# Patient Record
Sex: FEMALE | Race: WHITE | NOT HISPANIC OR LATINO | Employment: FULL TIME | URBAN - METROPOLITAN AREA
[De-identification: names, ages, dates, MRNs, and addresses within clinical notes are randomized per-mention and may not be internally consistent; named-entity substitution may affect disease eponyms.]

---

## 2017-01-13 ENCOUNTER — OFFICE VISIT (OUTPATIENT)
Dept: URGENT CARE | Facility: URGENT CARE | Age: 42
End: 2017-01-13
Payer: COMMERCIAL

## 2017-01-13 VITALS
TEMPERATURE: 98.8 F | OXYGEN SATURATION: 97 % | SYSTOLIC BLOOD PRESSURE: 139 MMHG | DIASTOLIC BLOOD PRESSURE: 91 MMHG | HEART RATE: 97 BPM | WEIGHT: 186 LBS | BODY MASS INDEX: 29.12 KG/M2

## 2017-01-13 DIAGNOSIS — J01.10 ACUTE FRONTAL SINUSITIS, RECURRENCE NOT SPECIFIED: ICD-10-CM

## 2017-01-13 DIAGNOSIS — J06.9 VIRAL UPPER RESPIRATORY TRACT INFECTION: ICD-10-CM

## 2017-01-13 DIAGNOSIS — R07.0 THROAT PAIN: Primary | ICD-10-CM

## 2017-01-13 LAB
DEPRECATED S PYO AG THROAT QL EIA: NORMAL
MICRO REPORT STATUS: NORMAL
SPECIMEN SOURCE: NORMAL

## 2017-01-13 PROCEDURE — 99213 OFFICE O/P EST LOW 20 MIN: CPT | Performed by: NURSE PRACTITIONER

## 2017-01-13 PROCEDURE — 87880 STREP A ASSAY W/OPTIC: CPT | Performed by: NURSE PRACTITIONER

## 2017-01-13 PROCEDURE — 87081 CULTURE SCREEN ONLY: CPT | Performed by: NURSE PRACTITIONER

## 2017-01-13 RX ORDER — FLUTICASONE PROPIONATE 50 MCG
1-2 SPRAY, SUSPENSION (ML) NASAL DAILY
Qty: 1 BOTTLE | Refills: 0 | Status: SHIPPED | OUTPATIENT
Start: 2017-01-13 | End: 2017-01-20

## 2017-01-13 RX ORDER — CETIRIZINE HYDROCHLORIDE 10 MG/1
10 TABLET ORAL EVERY EVENING
Qty: 14 TABLET | Refills: 0 | Status: SHIPPED | OUTPATIENT
Start: 2017-01-13 | End: 2017-01-27

## 2017-01-13 NOTE — MR AVS SNAPSHOT
After Visit Summary   1/13/2017    Roxane Lopes    MRN: [id number]           Patient Information     Date Of Birth          1975        Visit Information        Provider Department      1/13/2017 5:10 PM Kendra Gaspar NP [facility]        Today's Diagnoses     Throat pain    -  1     Viral upper respiratory tract infection         Acute frontal sinusitis, recurrence not specified           Care Instructions      Viral Upper Respiratory Illness (Adult)  You have a viral upper respiratory illness (URI), which is another term for the common cold. This illness is contagious during the first few days. It is spread through the air by coughing and sneezing. It may also be spread by direct contact (touching the sick person and then touching your own eyes, nose, or mouth). Frequent handwashing will decrease risk of spread. Most viral illnesses go away within 7 to 10 days with rest and simple home remedies. Sometimes the illness may last for several weeks. Antibiotics will not kill a virus, and they are generally not prescribed for this condition.    Home care    If symptoms are severe, rest at home for the first 2 to 3 days. When you resume activity, don't let yourself get too tired.    Avoid being exposed to cigarette smoke (yours or others ).    You may use acetaminophen or ibuprofen to control pain and fever, unless another medicine was prescribed. (Note: If you have chronic liver or kidney disease, have ever had a stomach ulcer or gastrointestinal bleeding, or are taking blood-thinning medicines, talk with your healthcare provider before using these medicines.) Aspirin should never be given to anyone under 18 years of age who is ill with a viral infection or fever. It may cause severe liver or brain damage.    Your appetite may be poor, so a light diet is fine. Avoid dehydration by drinking 6 to 8 glasses of fluids per day (water, soft drinks, juices, tea, or soup). Extra fluids will  help loosen secretions in the nose and lungs.    Over-the-counter cold medicines will not shorten the length of time you re sick, but they may be helpful for the following symptoms: cough, sore throat, and nasal and sinus congestion. (Note: Do not use decongestants if you have high blood pressure.)  Follow-up care  Follow up with your healthcare provider, or as advised.  When to seek medical advice  Call your healthcare provider right away if any of these occur:    Cough with lots of colored sputum (mucus)    Severe headache; face, neck, or ear pain    Difficulty swallowing due to throat pain    Fever of 100.4 F (38 C)  Call 911, or get immediate medical care  Call emergency services right away if any of these occur:    Chest pain, shortness of breath, wheezing, or difficulty breathing    Coughing up blood    Inability to swallow due to throat pain    5438-7928 [employer]. [address] [place] [address]. All rights reserved. This information is not intended as a substitute for professional medical care. Always follow your healthcare professional's instructions.              Follow-ups after your visit        Who to contact     If you have questions or need follow up information about today's clinic visit or your schedule please contact [facility] directly at [phone].  Normal or non-critical lab and imaging results will be communicated to you by [employer], letter or phone within 4 business days after the clinic has received the results. If you do not hear from us within 7 days, please contact the clinic through MyChart or phone. If you have a critical or abnormal lab result, we will notify you by phone as soon as possible.  Submit refill requests through [employer] or call your pharmacy and they will forward the refill request to us. Please allow 3 business days for your refill to be completed.          Additional Information About Your Visit        MyChart Information      [employer] gives you secure access to your electronic health record. If you see a primary care provider, you can also send messages to your care team and make appointments. If you have questions, please call your primary care clinic.  If you do not have a primary care provider, please call [phone] and they will assist you.        Care EveryWhere ID     This is your Care EveryWhere ID. This could be used by other organizations to access your [place] medical records  [id number]        Your Vitals Were     Pulse Temperature Pulse Oximetry Breastfeeding?          97 98.8  F (37.1  C) (Oral) 97% No         Blood Pressure from Last 3 Encounters:   01/13/17 139/91   09/19/16 130/80   02/26/16 129/88    Weight from Last 3 Encounters:   01/13/17 186 lb (84.369 kg)   09/19/16 198 lb 12.8 oz (90.175 kg)   02/26/16 197 lb 12.8 oz (89.721 kg)              We Performed the Following     Beta strep group A culture     Strep, Rapid Screen          Today's Medication Changes          These changes are accurate as of: 1/13/17  6:54 PM.  If you have any questions, ask your nurse or doctor.               Start taking these medicines.        Dose/Directions    cetirizine 10 MG tablet   Commonly known as:  zyrTEC   Used for:  Viral upper respiratory tract infection   Started by:  Kendra Gaspar NP        Dose:  10 mg   Take 1 tablet (10 mg) by mouth every evening for 14 days   Quantity:  14 tablet   Refills:  0       fluticasone 50 MCG/ACT spray   Commonly known as:  FLONASE   Used for:  Acute frontal sinusitis, recurrence not specified   Started by:  Kendra Gaspar NP        Dose:  1-2 spray   Spray 1-2 sprays into both nostrils daily for 7 days   Quantity:  1 Bottle   Refills:  0            Where to get your medicines      These medications were sent to [facility]/pharmacy #[id number] - [facility], [place] - [address].  [address] [place] [address]     Phone:  [phone]    - cetirizine 10 MG tablet  - fluticasone 50  MCG/ACT spray             Primary Care Provider Office Phone # Fax #    MEENAKSHI Ramos [facility] [phone]       [facility] [address]  [place] [address]        Thank you!     Thank you for choosing [facility]  for your care. Our goal is always to provide you with excellent care. Hearing back from our patients is one way we can continue to improve our services. Please take a few minutes to complete the written survey that you may receive in the mail after your visit with us. Thank you!             Your Updated Medication List - Protect others around you: Learn how to safely use, store and throw away your medicines at www.disposemymeds.org.          This list is accurate as of: 1/13/17  6:54 PM.  Always use your most recent med list.                   Brand Name Dispense Instructions for use    cetirizine 10 MG tablet    zyrTEC    14 tablet    Take 1 tablet (10 mg) by mouth every evening for 14 days       fluticasone 50 MCG/ACT spray    FLONASE    1 Bottle    Spray 1-2 sprays into both nostrils daily for 7 days       ibuprofen 600 MG tablet    ADVIL/MOTRIN    30 tablet    Take 1 tablet by mouth every 6 hours as needed for pain.       venlafaxine 75 MG 24 hr capsule    EFFEXOR-XR    30 capsule    TAKE 1 CAPSULE (75 MG) BY MOUTH DAILY

## 2017-01-13 NOTE — NURSING NOTE
"Chief Complaint   Patient presents with     Throat Problem     Pt c/o Sore throat, loosing voice, pain while talking, facial pain and pressure, right ear pain. Sx started on Tuesday.        Initial /91 mmHg  Pulse 97  Temp(Src) 98.8  F (37.1  C) (Oral)  Wt 186 lb (84.369 kg)  SpO2 97%  Breastfeeding? No Estimated body mass index is 29.12 kg/(m^2) as calculated from the following:    Height as of 9/19/16: 5' 7\" (1.702 m).    Weight as of this encounter: 186 lb (84.369 kg).  BP completed using cuff size: regular    Jaylin Warren CMA (AAMA)      "

## 2017-01-13 NOTE — PROGRESS NOTES
SUBJECTIVE:                                                    Roxane Lopes is a 41 year old female who presents to clinic today for the following health issues:      RESPIRATORY SYMPTOMS      Duration: Tuesday    Description  Sore throat, loosing voice, pain while talking, facial pain and pressure, right ear pain    Severity: moderate    Accompanying signs and symptoms: None    History (predisposing factors):  none    Precipitating or alleviating factors: None    Therapies tried and outcome:  rest and fluids, dayquil, nyquil, cough drops, sinus OTC medication- no relief.            Allergies   Allergen Reactions     Chantix [Varenicline Tartrate] Anxiety       Past Medical History   Diagnosis Date     DVT (deep venous thrombosis) (H) 4/2011     left leg, was on lovenox and coumadin but have been off since November 2011     Prediabetes      Depression      H/O tubal ligation      2006          Current Outpatient Prescriptions on File Prior to Visit:  venlafaxine (EFFEXOR-XR) 75 MG 24 hr capsule TAKE 1 CAPSULE (75 MG) BY MOUTH DAILY   ibuprofen (ADVIL,MOTRIN) 600 MG tablet Take 1 tablet by mouth every 6 hours as needed for pain.     No current facility-administered medications on file prior to visit.    Social History   Substance Use Topics     Smoking status: Former Smoker -- 0.50 packs/day for 10 years     Types: Cigarettes     Quit date: 10/16/2012     Smokeless tobacco: Never Used      Comment: on e cigs now trying to quit     Alcohol Use: Yes      Comment: Maybe once a year       ROS:  Consitutional: As above  ENT: As above  Respiratory: As above    OBJECTIVE:  /91 mmHg  Pulse 97  Temp(Src) 98.8  F (37.1  C) (Oral)  Wt 186 lb (84.369 kg)  SpO2 97%  Breastfeeding? No  GENERAL APPEARANCE: healthy, alert and no distress  EYES: conjunctiva clear  EARS:small cerumen.   Ear canals w/o erythema, TM's intact w/o erythema.    NOSE/MOUTH: Nasal passages with erythema.  THROAT: mild erythema with slight  tonsillar enlargement . no exudates  NECK: supple, nontender, no lymphadenopathy  RESP: lungs clear to auscultation - no rales, rhonchi or wheezes  CV: regular rates and rhythm, normal S1 S2, no murmur noted  NEURO: awake, alert        Rapid Strep test: Negative    ASSESSMENT:     ICD-10-CM    1. Throat pain R07.0 Strep, Rapid Screen     Beta strep group A culture   2. Viral upper respiratory tract infection J06.9 cetirizine (ZYRTEC) 10 MG tablet    B97.89    3. Acute frontal sinusitis, recurrence not specified J01.10 fluticasone (FLONASE) 50 MCG/ACT spray         PLAN:  I discussed lab results with the patient. Will follow up culture.  Lots of rest and fluids.  RTC if any worsening symptoms or if not improving.    Kendra Gaspar FNP-BC

## 2017-01-14 NOTE — PATIENT INSTRUCTIONS

## 2017-01-15 LAB
BACTERIA SPEC CULT: NORMAL
MICRO REPORT STATUS: NORMAL
SPECIMEN SOURCE: NORMAL

## 2017-03-29 DIAGNOSIS — F41.8 DEPRESSION WITH ANXIETY: ICD-10-CM

## 2017-04-03 NOTE — TELEPHONE ENCOUNTER
Routing refill request to provider for review/approval because:  PHQ9 score>4.  To provider to authorize per RN refill protocol.        Vivian Garcia RN,   [facility], [facility]

## 2017-04-03 NOTE — TELEPHONE ENCOUNTER
venlafaxine (EFFEXOR-XR) 75 MG 24 hr capsule    Last Written Prescription Date: 09/09/16  Last Fill Quantity: 30, # refills: 5  Last Office Visit with [facility], [facility] or [facility] prescribing provider: 09/01/16.        BP Readings from Last 3 Encounters:   01/13/17 (!) 139/91   09/19/16 130/80   02/26/16 129/88     Pulse: (for Fetzima)  Creatinine   Date Value Ref Range Status   09/01/2016 0.71 0.52 - 1.04 mg/dL Final   ]    Last PHQ-9 score on record=   PHQ-9 SCORE 9/1/2016   Total Score -   Total Score 7

## 2017-04-04 RX ORDER — VENLAFAXINE HYDROCHLORIDE 75 MG/1
CAPSULE, EXTENDED RELEASE ORAL
Qty: 30 CAPSULE | Refills: 5 | Status: SHIPPED | OUTPATIENT
Start: 2017-04-04 | End: 2017-11-02

## 2017-04-12 NOTE — TELEPHONE ENCOUNTER
No history of mychart review.     Patient Contact    Attempt # 3    Was call answered?  No.  Left message on home/cell number to return call to clinic regarding follow up questions from recent medication refill.  Dawna Salter, CMA

## 2017-09-27 ENCOUNTER — OFFICE VISIT (OUTPATIENT)
Dept: URGENT CARE | Facility: URGENT CARE | Age: 42
End: 2017-09-27
Payer: COMMERCIAL

## 2017-09-27 VITALS
SYSTOLIC BLOOD PRESSURE: 132 MMHG | DIASTOLIC BLOOD PRESSURE: 92 MMHG | TEMPERATURE: 98.5 F | HEART RATE: 91 BPM | BODY MASS INDEX: 30.38 KG/M2 | WEIGHT: 194 LBS | OXYGEN SATURATION: 90 %

## 2017-09-27 DIAGNOSIS — R07.0 THROAT PAIN: Primary | ICD-10-CM

## 2017-09-27 DIAGNOSIS — H66.001 ACUTE SUPPURATIVE OTITIS MEDIA OF RIGHT EAR WITHOUT SPONTANEOUS RUPTURE OF TYMPANIC MEMBRANE, RECURRENCE NOT SPECIFIED: ICD-10-CM

## 2017-09-27 LAB
DEPRECATED S PYO AG THROAT QL EIA: NORMAL
SPECIMEN SOURCE: NORMAL

## 2017-09-27 PROCEDURE — 87880 STREP A ASSAY W/OPTIC: CPT | Performed by: NURSE PRACTITIONER

## 2017-09-27 PROCEDURE — 87081 CULTURE SCREEN ONLY: CPT | Performed by: NURSE PRACTITIONER

## 2017-09-27 PROCEDURE — 99213 OFFICE O/P EST LOW 20 MIN: CPT | Performed by: NURSE PRACTITIONER

## 2017-09-27 RX ORDER — AMOXICILLIN 500 MG/1
500 CAPSULE ORAL 2 TIMES DAILY
Qty: 20 CAPSULE | Refills: 0 | Status: SHIPPED | OUTPATIENT
Start: 2017-09-27 | End: 2017-10-07

## 2017-09-27 RX ORDER — LORATADINE 10 MG/1
10 TABLET ORAL DAILY
COMMUNITY
End: 2018-05-21

## 2017-09-27 NOTE — PATIENT INSTRUCTIONS
Otitis Media (Middle-Ear Infection) in Adults  Otitis media is another name for a middle-ear infection. It means an infection behind your eardrum. This kind of ear infection can happen after any condition that keeps fluid from draining from the middle ear. These conditions include allergies, a cold, a sore throat, or a respiratory infection.  Middle-ear infections are common in children, but they can also happen in adults. An ear infection in an adult may mean a more serious problem than in a child. So you may need additional tests. If you have an ear infection, you should see your health care provider for treatment.  What are the types of middle-ear infections?  Infections can affect the middle ear in several ways. They are:    Acute otitis media. This middle-ear infection occurs suddenly. It causes swelling and redness. Fluid and mucus become trapped inside the ear. You can have a fever and ear pain.    Otitis media with effusion. Fluid (effusion) and mucus build up in the middle ear after the infection goes away. You may feel like your middle ear is full. This can continue for months and may affect your hearing.    Chronic otitis media with effusion. Fluid (effusion) remains in the middle ear for a long time. Or it builds up again and again, even though there is no infection. This type of middle-ear infection may be hard to treat. It may also affect your hearing.  Who is more likely to get a middle-ear infection?  You are more likely to get an ear infection if you:    Smoke or are around someone who smokes    Have seasonal or year-round allergy symptoms    Have a cold or other upper respiratory infection  What causes a middle-ear infection?  The middle ear connects to the throat by a canal called the eustachian tube. This tube helps even out the pressure between the outer ear and the inner ear. A cold or allergy can irritate the tube or cause the area around it to swell. This can keep fluid from draining from  the middle ear. The fluid builds up behind the eardrum. Bacteria and viruses can grow in this fluid. The bacteria and viruses cause the middle-ear infection.  What are the symptoms of a middle-ear infection?  Common symptoms of a middle-ear infection in adults are:    Pain in 1 or both ears    Drainage from the ear    Muffled hearing    Sore throat   You may also have a fever. Rarely, your balance can be affected.  These symptoms may be the same as for other conditions. It s important to talk with your health care provider if you think you have a middle-ear infection. If you have a high fever, severe pain behind your ear, or paralysis in your face, see your provider as soon as you can.  How is a middle-ear infection diagnosed?  Your health care provider will take a medical history and do a physical exam. He or she will look at the outer ear and eardrum with an otoscope. The otoscope is a lighted tool that lets your provider see inside the ear. A pneumatic otoscope blows a puff of air into the ear to check how well your eardrum moves. If you eardrum doesn t move well, it may mean you have fluid behind it.  Your provider may also do a test called tympanometry. This test tells how well the middle ear is working. It can find any changes in pressure in the middle ear. Your provider may test your hearing with a tuning fork.  How is a middle-ear infection treated?  A middle-ear infection may be treated with:    Antibiotics, taken by mouth or as ear drops    Medication for pain    Decongestants, antihistamines, or nasal steroids  Your health care provider may also have you try autoinsufflation. This helps adjust the air pressure in your ear. For this, you pinch your nose and gently exhale. This forces air back through the eustachian tube.  The exact treatment for your ear infection will depend on the type of infection you have. In general, if your symptoms don t get better in 48 to 72 hours, contact your health care  provider.  Middle-ear infections can cause long-term problems if not treated. They can lead to:    Infection in other parts of the head    Permanent hearing loss    Paralysis of a nerve in your face  If you have a middle-ear infection that doesn t get better, you may need to see an ear, nose, and throat specialist (otolaryngologist). You may need a CT scan or MRI to check for head and neck cancer.  Ear tubes  Sometimes fluid stays in the middle ear even after you take antibiotics and the infection goes away. In this case, your health care provider may suggest that a small tube be placed in your ear. The tube is put at the opening of the eardrum. The tube keeps fluid from building up and relieves pressure in the middle ear. It can also help you hear better. This surgery is called myringotomy. It is not often done in adults.  The tubes usually fall out on their own after 6 months to a year.    8055-2424 [employer]. [address] [place] [address]. All rights reserved. This information is not intended as a substitute for professional medical care. Always follow your healthcare professional's instructions.        When You Have a Sore Throat    A sore throat can be painful. There are many reasons why you may have a sore throat. Your healthcare provider will work with you to find the cause of your sore throat. He or she will also find the best treatment for you.  What causes a sore throat?  Sore throats can be caused or worsened by:    Cold or flu viruses    Bacteria    Irritants such as tobacco smoke or air pollution    Acid reflux  A healthy throat  The tonsils are on the sides of the throat near the base of the tongue. They collect viruses and bacteria and help fight infection. The throat (pharynx) is the passage for air. Mucus from the nasal cavity also moves down the passage.  An inflamed throat  The tonsils and pharynx can become inflamed due to a cold or flu virus. Postnasal drip (excess  mucus draining from the nasal cavity) can irritate the throat. It can also make the throat or tonsils more likely to be infected by bacteria. Severe, untreated tonsillitis in children or adults can cause a pocket of pus (abscess) to form near the tonsil.  Your evaluation  A medical evaluation can help find the cause of your sore throat. It can also help your healthcare provider choose the best treatment for you. The evaluation may include a health history, physical exam, and diagnostic tests.  Health history  Your healthcare provider may ask you the following:    How long has the sore throat lasted and how have you been treating it?    Do you have any other symptoms, such as body aches, fever, or cough?    Does your sore throat recur? If so, how often? How many days of school or work have you missed because of a sore throat?    Do you have trouble eating or swallowing?    Have you been told that you snore or have other sleep problems?    Do you have bad breath?    Do you cough up bad-tasting mucus?  Physical exam  During the exam, your healthcare provider checks your ears, nose, and throat for problems. He or she also checks for swelling in the neck, and may listen to your chest.  Possible tests  Other tests your healthcare provider may perform include:    A throat swab to check for bacteria such as streptococcus (the bacteria that causes strep throat)    A blood test to check for mononucleosis (a viral infection)    A chest X-ray to rule out pneumonia, especially if you have a cough  Treating a sore throat  Treatment depends on many factors. What is the likely cause? Is the problem recent? Does it keep coming back? In many cases, the best thing to do is to treat the symptoms, rest, and let the problem heal itself. Antibiotics may help clear up some bacterial infections. For cases of severe or recurring tonsillitis, the tonsils may need to be removed.  Relieving your symptoms    Don t smoke, and avoid secondhand  "smoke.    For children, try throat sprays or Popsicles. Adults and older children may try lozenges.    Drink warm liquids to soothe the throat and help thin mucus. Avoid alcohol, spicy foods, and acidic drinks such as orange juice. These can irritate the throat.    Gargle with warm saltwater (1 teaspoon of salt to 8 ounces of warm water).    Use a humidifier to keep air moist and relieve throat dryness.    Try over-the-counter pain relievers such as acetaminophen or ibuprofen. Use as directed, and don t exceed the recommended dose. Don t give aspirin to children.   Are antibiotics needed?  If your sore throat is due to a bacterial infection, antibiotics may speed healing and prevent complications. Although group A streptococcus (\"strep throat\" or GAS) is the major treatable infection for a sore throat, GAS causes only 5% to 15% of sore throats in adults who seek medical care. Most sore throats are caused by cold or flu viruses. And antibiotics don t treat viral illness. In fact, using antibiotics when they re not needed may produce bacteria that are harder to kill. Your healthcare provider will prescribe antibiotics only if he or she thinks they are likely to help.  If antibiotics are prescribed  Take the medicine exactly as directed. Be sure to finish your prescription even if you re feeling better. And be sure to ask your healthcare provider or pharmacist what side effects are common and what to do about them.  Is surgery needed?  In some cases, tonsils need to be removed. This is often done as outpatient (same-day) surgery. Your healthcare provider may advise removing the tonsils in cases of:    Several severe bouts of tonsillitis in a year.  Severe  episodes include those that lead to missed days of school or work, or that need to be treated with antibiotics.    Tonsillitis that causes breathing problems during sleep    Tonsillitis caused by food particles collecting in pouches in the tonsils (cryptic " tonsillitis)  Call your healthcare provider if any of the following occur:    Symptoms worsen, or new symptoms develop.    Swollen tonsils make breathing difficult.    The pain is severe enough to keep you from drinking liquids.    A skin rash, hives, or wheezing develops. Any of these could signal an allergic reaction to antibiotics.    Symptoms don t improve within a week.    Symptoms don t improve within 2 to 3 days of starting antibiotics.   Date Last Reviewed: 10/1/2016    5811-4545 [employer] [address] [place] [address]. All rights reserved. This information is not intended as a substitute for professional medical care. Always follow your healthcare professional's instructions.

## 2017-09-27 NOTE — NURSING NOTE
"Chief Complaint   Patient presents with     URI     2 weeks       Initial BP (!) 132/92 (BP Location: Left arm, Patient Position: Chair, Cuff Size: Adult Regular)  Pulse 91  Temp 98.5  F (36.9  C) (Oral)  Wt 194 lb (88 kg)  SpO2 90%  BMI 30.38 kg/m2 Estimated body mass index is 30.38 kg/(m^2) as calculated from the following:    Height as of 9/19/16: 5' 7\" (1.702 m).    Weight as of this encounter: 194 lb (88 kg).  Medication Reconciliation: complete     Adriane Carpio CMA      "

## 2017-09-27 NOTE — MR AVS SNAPSHOT
After Visit Summary   9/27/2017    Roxane Lopes    MRN: [id number]           Patient Information     Date Of Birth          1975        Visit Information        Provider Department      9/27/2017 3:45 PM Kendra Gaspar NP [facility]        Today's Diagnoses     Throat pain    -  1    Acute suppurative otitis media of right ear without spontaneous rupture of tympanic membrane, recurrence not specified          Care Instructions      Otitis Media (Middle-Ear Infection) in Adults  Otitis media is another name for a middle-ear infection. It means an infection behind your eardrum. This kind of ear infection can happen after any condition that keeps fluid from draining from the middle ear. These conditions include allergies, a cold, a sore throat, or a respiratory infection.  Middle-ear infections are common in children, but they can also happen in adults. An ear infection in an adult may mean a more serious problem than in a child. So you may need additional tests. If you have an ear infection, you should see your health care provider for treatment.  What are the types of middle-ear infections?  Infections can affect the middle ear in several ways. They are:    Acute otitis media. This middle-ear infection occurs suddenly. It causes swelling and redness. Fluid and mucus become trapped inside the ear. You can have a fever and ear pain.    Otitis media with effusion. Fluid (effusion) and mucus build up in the middle ear after the infection goes away. You may feel like your middle ear is full. This can continue for months and may affect your hearing.    Chronic otitis media with effusion. Fluid (effusion) remains in the middle ear for a long time. Or it builds up again and again, even though there is no infection. This type of middle-ear infection may be hard to treat. It may also affect your hearing.  Who is more likely to get a middle-ear infection?  You are more likely to get an ear  infection if you:    Smoke or are around someone who smokes    Have seasonal or year-round allergy symptoms    Have a cold or other upper respiratory infection  What causes a middle-ear infection?  The middle ear connects to the throat by a canal called the eustachian tube. This tube helps even out the pressure between the outer ear and the inner ear. A cold or allergy can irritate the tube or cause the area around it to swell. This can keep fluid from draining from the middle ear. The fluid builds up behind the eardrum. Bacteria and viruses can grow in this fluid. The bacteria and viruses cause the middle-ear infection.  What are the symptoms of a middle-ear infection?  Common symptoms of a middle-ear infection in adults are:    Pain in 1 or both ears    Drainage from the ear    Muffled hearing    Sore throat   You may also have a fever. Rarely, your balance can be affected.  These symptoms may be the same as for other conditions. It s important to talk with your health care provider if you think you have a middle-ear infection. If you have a high fever, severe pain behind your ear, or paralysis in your face, see your provider as soon as you can.  How is a middle-ear infection diagnosed?  Your health care provider will take a medical history and do a physical exam. He or she will look at the outer ear and eardrum with an otoscope. The otoscope is a lighted tool that lets your provider see inside the ear. A pneumatic otoscope blows a puff of air into the ear to check how well your eardrum moves. If you eardrum doesn t move well, it may mean you have fluid behind it.  Your provider may also do a test called tympanometry. This test tells how well the middle ear is working. It can find any changes in pressure in the middle ear. Your provider may test your hearing with a tuning fork.  How is a middle-ear infection treated?  A middle-ear infection may be treated with:    Antibiotics, taken by mouth or as ear  drops    Medication for pain    Decongestants, antihistamines, or nasal steroids  Your health care provider may also have you try autoinsufflation. This helps adjust the air pressure in your ear. For this, you pinch your nose and gently exhale. This forces air back through the eustachian tube.  The exact treatment for your ear infection will depend on the type of infection you have. In general, if your symptoms don t get better in 48 to 72 hours, contact your health care provider.  Middle-ear infections can cause long-term problems if not treated. They can lead to:    Infection in other parts of the head    Permanent hearing loss    Paralysis of a nerve in your face  If you have a middle-ear infection that doesn t get better, you may need to see an ear, nose, and throat specialist (otolaryngologist). You may need a CT scan or MRI to check for head and neck cancer.  Ear tubes  Sometimes fluid stays in the middle ear even after you take antibiotics and the infection goes away. In this case, your health care provider may suggest that a small tube be placed in your ear. The tube is put at the opening of the eardrum. The tube keeps fluid from building up and relieves pressure in the middle ear. It can also help you hear better. This surgery is called myringotomy. It is not often done in adults.  The tubes usually fall out on their own after 6 months to a year.    5123-2830 The "[employer]. [address] [place] [address]. All rights reserved. This information is not intended as a substitute for professional medical care. Always follow your healthcare professional's instructions.        When You Have a Sore Throat    A sore throat can be painful. There are many reasons why you may have a sore throat. Your healthcare provider will work with you to find the cause of your sore throat. He or she will also find the best treatment for you.  What causes a sore throat?  Sore throats can be caused or worsened  by:    Cold or flu viruses    Bacteria    Irritants such as tobacco smoke or air pollution    Acid reflux  A healthy throat  The tonsils are on the sides of the throat near the base of the tongue. They collect viruses and bacteria and help fight infection. The throat (pharynx) is the passage for air. Mucus from the nasal cavity also moves down the passage.  An inflamed throat  The tonsils and pharynx can become inflamed due to a cold or flu virus. Postnasal drip (excess mucus draining from the nasal cavity) can irritate the throat. It can also make the throat or tonsils more likely to be infected by bacteria. Severe, untreated tonsillitis in children or adults can cause a pocket of pus (abscess) to form near the tonsil.  Your evaluation  A medical evaluation can help find the cause of your sore throat. It can also help your healthcare provider choose the best treatment for you. The evaluation may include a health history, physical exam, and diagnostic tests.  Health history  Your healthcare provider may ask you the following:    How long has the sore throat lasted and how have you been treating it?    Do you have any other symptoms, such as body aches, fever, or cough?    Does your sore throat recur? If so, how often? How many days of school or work have you missed because of a sore throat?    Do you have trouble eating or swallowing?    Have you been told that you snore or have other sleep problems?    Do you have bad breath?    Do you cough up bad-tasting mucus?  Physical exam  During the exam, your healthcare provider checks your ears, nose, and throat for problems. He or she also checks for swelling in the neck, and may listen to your chest.  Possible tests  Other tests your healthcare provider may perform include:    A throat swab to check for bacteria such as streptococcus (the bacteria that causes strep throat)    A blood test to check for mononucleosis (a viral infection)    A chest X-ray to rule out  "pneumonia, especially if you have a cough  Treating a sore throat  Treatment depends on many factors. What is the likely cause? Is the problem recent? Does it keep coming back? In many cases, the best thing to do is to treat the symptoms, rest, and let the problem heal itself. Antibiotics may help clear up some bacterial infections. For cases of severe or recurring tonsillitis, the tonsils may need to be removed.  Relieving your symptoms    Don t smoke, and avoid secondhand smoke.    For children, try throat sprays or Popsicles. Adults and older children may try lozenges.    Drink warm liquids to soothe the throat and help thin mucus. Avoid alcohol, spicy foods, and acidic drinks such as orange juice. These can irritate the throat.    Gargle with warm saltwater (1 teaspoon of salt to 8 ounces of warm water).    Use a humidifier to keep air moist and relieve throat dryness.    Try over-the-counter pain relievers such as acetaminophen or ibuprofen. Use as directed, and don t exceed the recommended dose. Don t give aspirin to children.   Are antibiotics needed?  If your sore throat is due to a bacterial infection, antibiotics may speed healing and prevent complications. Although group A streptococcus (\"strep throat\" or GAS) is the major treatable infection for a sore throat, GAS causes only 5% to 15% of sore throats in adults who seek medical care. Most sore throats are caused by cold or flu viruses. And antibiotics don t treat viral illness. In fact, using antibiotics when they re not needed may produce bacteria that are harder to kill. Your healthcare provider will prescribe antibiotics only if he or she thinks they are likely to help.  If antibiotics are prescribed  Take the medicine exactly as directed. Be sure to finish your prescription even if you re feeling better. And be sure to ask your healthcare provider or pharmacist what side effects are common and what to do about them.  Is surgery needed?  In some " cases, tonsils need to be removed. This is often done as outpatient (same-day) surgery. Your healthcare provider may advise removing the tonsils in cases of:    Several severe bouts of tonsillitis in a year.  Severe  episodes include those that lead to missed days of school or work, or that need to be treated with antibiotics.    Tonsillitis that causes breathing problems during sleep    Tonsillitis caused by food particles collecting in pouches in the tonsils (cryptic tonsillitis)  Call your healthcare provider if any of the following occur:    Symptoms worsen, or new symptoms develop.    Swollen tonsils make breathing difficult.    The pain is severe enough to keep you from drinking liquids.    A skin rash, hives, or wheezing develops. Any of these could signal an allergic reaction to antibiotics.    Symptoms don t improve within a week.    Symptoms don t improve within 2 to 3 days of starting antibiotics.   Date Last Reviewed: 10/1/2016    0602-2704 The [employer]. [address] [place] [address]. All rights reserved. This information is not intended as a substitute for professional medical care. Always follow your healthcare professional's instructions.                Follow-ups after your visit        Who to contact     If you have questions or need follow up information about today's clinic visit or your schedule please contact [facility] directly at [phone].  Normal or non-critical lab and imaging results will be communicated to you by MyChart, letter or phone within 4 business days after the clinic has received the results. If you do not hear from us within 7 days, please contact the clinic through MyChart or phone. If you have a critical or abnormal lab result, we will notify you by phone as soon as possible.  Submit refill requests through [employer] or call your pharmacy and they will forward the refill request to us. Please allow 3 business days for your refill  to be completed.          Additional Information About Your Visit        [employer] Information     [employer] gives you secure access to your electronic health record. If you see a primary care provider, you can also send messages to your care team and make appointments. If you have questions, please call your primary care clinic.  If you do not have a primary care provider, please call [phone] and they will assist you.        Care EveryWhere ID     This is your Care EveryWhere ID. This could be used by other organizations to access your [place] medical records  [id number]        Your Vitals Were     Pulse Temperature Pulse Oximetry BMI (Body Mass Index)          91 98.5  F (36.9  C) (Oral) 90% 30.38 kg/m2         Blood Pressure from Last 3 Encounters:   09/27/17 (!) 132/92   01/13/17 (!) 139/91   09/19/16 130/80    Weight from Last 3 Encounters:   09/27/17 194 lb (88 kg)   01/13/17 186 lb (84.4 kg)   09/19/16 198 lb 12.8 oz (90.2 kg)              We Performed the Following     Beta strep group A culture     Strep, Rapid Screen          Today's Medication Changes          These changes are accurate as of: 9/27/17  5:06 PM.  If you have any questions, ask your nurse or doctor.               Start taking these medicines.        Dose/Directions    amoxicillin 500 MG capsule   Commonly known as:  AMOXIL   Used for:  Acute suppurative otitis media of right ear without spontaneous rupture of tympanic membrane, recurrence not specified   Started by:  Kendra Gaspar NP        Dose:  500 mg   Take 1 capsule (500 mg) by mouth 2 times daily for 10 days   Quantity:  20 capsule   Refills:  0            Where to get your medicines      These medications were sent to [facility]/pharmacy #[id number] - [place], [place] - [address].  [address] [place] [address]     Phone:  [phone]     amoxicillin 500 MG capsule                Primary Care Provider Office Phone # Fax #    MEENAKSHI Ramos CNP  [phone]       [address]  [place] [address]        Equal Access to Services     RADHAMES BOYKIN : Hadomari aad ku haddiane Bunch, wavitoda luqmaryellen, elisata kaalejandrada mamta, susy verdinmaryuri sofiya. So [facility] [phone].    ATENCIÓN: Si habla español, tiene a purvis disposición servicios gratuitos de asistencia lingüística. Llame al [phone].    We comply with applicable federal civil rights laws and [place] laws. We do not discriminate on the basis of race, color, national origin, age, disability sex, sexual orientation or gender identity.            Thank you!     Thank you for choosing [facility]  for your care. Our goal is always to provide you with excellent care. Hearing back from our patients is one way we can continue to improve our services. Please take a few minutes to complete the written survey that you may receive in the mail after your visit with us. Thank you!             Your Updated Medication List - Protect others around you: Learn how to safely use, store and throw away your medicines at www.disposemymeds.org.          This list is accurate as of: 9/27/17  5:06 PM.  Always use your most recent med list.                   Brand Name Dispense Instructions for use Diagnosis    amoxicillin 500 MG capsule    AMOXIL    20 capsule    Take 1 capsule (500 mg) by mouth 2 times daily for 10 days    Acute suppurative otitis media of right ear without spontaneous rupture of tympanic membrane, recurrence not specified       ibuprofen 600 MG tablet    ADVIL/MOTRIN    30 tablet    Take 1 tablet by mouth every 6 hours as needed for pain.        loratadine 10 MG tablet    CLARITIN     Take 10 mg by mouth daily    Throat pain       venlafaxine 75 MG 24 hr capsule    EFFEXOR-XR    30 capsule    TAKE 1 CAPSULE (75 MG) BY MOUTH DAILY    Depression with anxiety

## 2017-09-27 NOTE — PROGRESS NOTES
SUBJECTIVE:   Roxane Lopes is a 42 year old female who presents to clinic today for the following health issues:      RESPIRATORY SYMPTOMS      Duration: 2 weeks    Description  nasal congestion, sore throat, cough, fever, ear pain right, headache, fatigue/malaise, hoarse voice and myalgias    Severity: moderate    Accompanying signs and symptoms: None    History (predisposing factors):  none    Precipitating or alleviating factors: None    Therapies tried and outcome:  oral decongestant antihistamine OTC NSAID       Allergies   Allergen Reactions     Chantix [Varenicline Tartrate] Anxiety       Past Medical History:   Diagnosis Date     Depression      DVT (deep venous thrombosis) (H) 4/2011    left leg, was on lovenox and coumadin but have been off since November 2011     H/O tubal ligation     2006      Prediabetes          Current Outpatient Prescriptions on File Prior to Visit:  venlafaxine (EFFEXOR-XR) 75 MG 24 hr capsule TAKE 1 CAPSULE (75 MG) BY MOUTH DAILY   ibuprofen (ADVIL,MOTRIN) 600 MG tablet Take 1 tablet by mouth every 6 hours as needed for pain.     No current facility-administered medications on file prior to visit.     Social History   Substance Use Topics     Smoking status: Former Smoker     Packs/day: 0.50     Years: 10.00     Types: Cigarettes     Quit date: 10/16/2012     Smokeless tobacco: Never Used      Comment: on e cigs now trying to quit     Alcohol use Yes      Comment: Maybe once a year       ROS:   Constitutional: no fevers  ENT: as above    OBJECTIVE:  BP (!) 132/92 (BP Location: Left arm, Patient Position: Chair, Cuff Size: Adult Regular)  Pulse 91  Temp 98.5  F (36.9  C) (Oral)  Wt 194 lb (88 kg)  SpO2 90%  BMI 30.38 kg/m2   General:   awake, alert, and cooperative.  NAD.   Head: Normocephalic, atraumatic.  Eyes: Conjunctiva clear,   ENT: . RT Canal is intact, right  TM is bulging and has erythema, left CANAL is intact, left TM is grey and translucent  Throat: Mild erythema,  no tonsillar hypertrophy or exudates.  Neuro: Alert and oriented - normal speech.    ASSESSMENT:    ICD-10-CM    1. Throat pain R07.0 loratadine (CLARITIN) 10 MG tablet     Strep, Rapid Screen     Beta strep group A culture   2. Acute suppurative otitis media of right ear without spontaneous rupture of tympanic membrane, recurrence not specified H66.001 amoxicillin (AMOXIL) 500 MG capsule       PLAN:     Antibiotics as prescribed.  Recheck ear in 2 weeks.  Patient educational/instructional material provided including reasons for follow-up    The patient indicates understanding of these issues and agrees with the plan.   Advised about symptoms which might herald more serious problems.    Kendra Gaspar  FN-BC  Family Nurse Practitoner

## 2017-09-28 LAB
BACTERIA SPEC CULT: NORMAL
SPECIMEN SOURCE: NORMAL

## 2017-11-02 DIAGNOSIS — F41.8 DEPRESSION WITH ANXIETY: ICD-10-CM

## 2017-11-02 NOTE — LETTER
November 3, 2017      Roxane Lopes  [address] [place] [id number]              Dear Roxane,      We recently received a call from your pharmacy requesting a refill of your medication. We have provided a 30 day refill of your medication, venlafaxine (EFFEXOR-XR) 75 MG 24 hr capsule, as requested.      A review of your chart indicates that your last office visit was on 9/1/2016.  An appointment is required twice yearly with your provider.    We have authorized one time 30 day refill of your medication to allow time for you to schedule.     If you have a history of diabetes or high cholesterol, please come in fasting for the appointment. Fasting entails nothing to eat or drink 8 hours prior to your appointment; with the exception on water. You may take your medication the day of the appointment.    Please call the clinic to schedule your appointment.    Thank you for taking an active role in your healthcare.      Sincerely,    Miguelina Bledsoe CNP

## 2017-11-03 RX ORDER — VENLAFAXINE HYDROCHLORIDE 75 MG/1
CAPSULE, EXTENDED RELEASE ORAL
Qty: 30 CAPSULE | Refills: 0 | Status: SHIPPED | OUTPATIENT
Start: 2017-11-03 | End: 2018-07-18

## 2017-11-03 NOTE — TELEPHONE ENCOUNTER
venlafaxine (EFFEXOR-XR) 75 MG 24 hr capsule  Last Written Prescription Date: 4/4/2017  Last Fill Quantity: 30, # refills: 5  Last Office Visit with [facility] primary care provider:  9/1/2016        Last PHQ-9 score on record= Depression with anxiety [F41.8]   PHQ-9 SCORE 9/1/2016   Total Score -   Total Score 7        Jazlyn refill. Letter sent to patient. Deloris Whitman RN

## 2017-12-17 ENCOUNTER — HEALTH MAINTENANCE LETTER (OUTPATIENT)
Age: 42
End: 2017-12-17

## 2018-05-21 ENCOUNTER — RADIANT APPOINTMENT (OUTPATIENT)
Dept: ULTRASOUND IMAGING | Facility: CLINIC | Age: 43
End: 2018-05-21
Attending: FAMILY MEDICINE
Payer: COMMERCIAL

## 2018-05-21 ENCOUNTER — OFFICE VISIT (OUTPATIENT)
Dept: PEDIATRICS | Facility: CLINIC | Age: 43
End: 2018-05-21
Payer: COMMERCIAL

## 2018-05-21 ENCOUNTER — TELEPHONE (OUTPATIENT)
Dept: PEDIATRICS | Facility: CLINIC | Age: 43
End: 2018-05-21

## 2018-05-21 VITALS
HEART RATE: 95 BPM | TEMPERATURE: 96.9 F | SYSTOLIC BLOOD PRESSURE: 120 MMHG | OXYGEN SATURATION: 100 % | BODY MASS INDEX: 31.87 KG/M2 | DIASTOLIC BLOOD PRESSURE: 82 MMHG | WEIGHT: 203.5 LBS

## 2018-05-21 DIAGNOSIS — I82.402 DEEP VEIN THROMBOSIS (DVT) OF LEFT LOWER EXTREMITY, UNSPECIFIED CHRONICITY, UNSPECIFIED VEIN (H): ICD-10-CM

## 2018-05-21 DIAGNOSIS — M79.605 PAIN OF LEFT LOWER EXTREMITY: Primary | ICD-10-CM

## 2018-05-21 DIAGNOSIS — M79.662 PAIN OF LEFT LOWER LEG: Primary | ICD-10-CM

## 2018-05-21 DIAGNOSIS — M79.605 PAIN OF LEFT LOWER EXTREMITY: ICD-10-CM

## 2018-05-21 PROCEDURE — 93971 EXTREMITY STUDY: CPT | Mod: LT | Performed by: RADIOLOGY

## 2018-05-21 PROCEDURE — 99213 OFFICE O/P EST LOW 20 MIN: CPT | Performed by: FAMILY MEDICINE

## 2018-05-21 RX ORDER — IBUPROFEN 800 MG/1
800 TABLET, FILM COATED ORAL EVERY 8 HOURS PRN
Qty: 30 TABLET | Refills: 1 | COMMUNITY
Start: 2018-05-21 | End: 2022-12-12

## 2018-05-21 ASSESSMENT — PAIN SCALES - GENERAL: PAINLEVEL: EXTREME PAIN (8)

## 2018-05-21 NOTE — PATIENT INSTRUCTIONS
Acute Pain, Uncertain Cause  Pain can be caused by many conditions that range from very minor to very serious. In some cases, though, pain comes and goes with no apparent cause.  We were not able to find the exact cause for your pain. At this time there is no sign of any serious illness causing your pain. More tests may be needed to determine the cause. In many cases, pain like this goes away by itself.  Home care  Take any medicines as prescribed. If another medicine was not prescribed for pain, you can take an over-the-counter pain medicine such as ibuprofen or acetaminophen. Use these as directed on the label.    Follow-up care  Follow up with your healthcare provider or our staff as directed.  When to seek medical advice  Call your healthcare provider for any of the following:    Pain changes in pattern    Pain doesn't lessen or gets worse    New symptoms appear    Fever of 100.4 F (38 C) or higher, or as directed by your healthcare provider  Date Last Reviewed: 7/26/2015 2000-2017 [employer]. [address] [place] [address], [place] [id number]. All rights reserved. This information is not intended as a substitute for professional medical care. Always follow your healthcare professional's instructions.    Resrt, ice or heat, limit standign and walkinmg, elevaste involved extremity, and trial of Tylenol #3 as prescribed. Noreen drew with Adelaide Orozco if not improving.

## 2018-05-21 NOTE — TELEPHONE ENCOUNTER
Patient would like to request a pain medication be sent in for her. She states the Ibuprofen is not working. Please advise.

## 2018-05-21 NOTE — TELEPHONE ENCOUNTER
Routing to Dr. Hernandez who saw patient today in clinic.    Vivian Garcia RN,   [facility]

## 2018-05-21 NOTE — PROGRESS NOTES
SUBJECTIVE:   Roxane Lopes is a 43 year old female who presents to clinic today for the following health issues:      Concern - Pt that is here for the concerns of having a possible blood clot on her left leg behind the knee. Pt states that she had clot five years ago on the same leg.   Onset: 05/16/2018    Description:   Pt is here for the concerns of having a possible blood clot on her left leg behind the knee. Pt states that she had clot five years ago on the same leg.       Intensity: 7/10    Progression of Symptoms:  worsening and constant    Accompanying Signs & Symptoms:  Pt states that her leg feels like fire,and has noticed some swelling    Previous history of similar problem:   Yes, five to six years ago    Precipitating factors:   Worsened by: sitting, bending, moving it    Alleviating factors:  Improved by: Not moving it    Therapies Tried and outcome: Elevating, ice,         Problem list and histories reviewed & adjusted, as indicated.  Additional history: as documented    Patient Active Problem List   Diagnosis     Varicose veins     Left leg pain     DVT (deep venous thrombosis) (H)     PTSD (post-traumatic stress disorder)     Tobacco use disorder     Venous insufficiency of leg-left     Prediabetes     Thumb injury     Anxiety     Gastroesophageal reflux disease without esophagitis     Galactorrhea of left breast     Generalized hyperhidrosis     Fatigue     CARDIOVASCULAR SCREENING; LDL GOAL LESS THAN 160     Depression with anxiety     Past Surgical History:   Procedure Laterality Date     LAPAROSCOPIC CHOLECYSTECTOMY  10/9/2012    Procedure: LAPAROSCOPIC CHOLECYSTECTOMY;  Laparoscopic Cholecystectomy;  Surgeon: Martell Briscoe MD;  Location:  OR     LAPAROSCOPIC TUBAL LIGATION         Social History   Substance Use Topics     Smoking status: Former Smoker     Packs/day: 0.50     Years: 10.00     Types: Cigarettes     Quit date: 10/16/2012     Smokeless tobacco: Never Used      Comment: on  e cigs now trying to quit     Alcohol use Yes      Comment: Maybe once a year     Family History   Problem Relation Age of Onset     DIABETES       DIABETES Maternal Grandfather      Other - See Comments Maternal Grandmother      24 yrs old blood clot     C.A.D. No family hx of      CEREBROVASCULAR DISEASE No family hx of      Hypertension No family hx of      Anesthesia Reaction No family hx of      Arthritis No family hx of      Asthma No family hx of      Breast Cancer No family hx of      Cancer - colorectal No family hx of      Prostate Cancer No family hx of      Thyroid Disease No family hx of      Lipids No family hx of      Congenital Anomalies No family hx of      Depression Mother          Current Outpatient Prescriptions   Medication Sig Dispense Refill     venlafaxine (EFFEXOR-XR) 75 MG 24 hr capsule TAKE 1 CAPSULE (75 MG) BY MOUTH DAILY 30 capsule 0     ibuprofen (ADVIL,MOTRIN) 600 MG tablet Take 1 tablet by mouth every 6 hours as needed for pain. (Patient not taking: Reported on 5/21/2018) 30 tablet 0     loratadine (CLARITIN) 10 MG tablet Take 10 mg by mouth daily       Allergies   Allergen Reactions     Chantix [Varenicline Tartrate] Anxiety     BP Readings from Last 3 Encounters:   05/21/18 120/82   09/27/17 (!) 132/92   01/13/17 (!) 139/91    Wt Readings from Last 3 Encounters:   05/21/18 203 lb 8 oz (92.3 kg)   09/27/17 194 lb (88 kg)   01/13/17 186 lb (84.4 kg)                    Reviewed and updated as needed this visit by clinical staff  Tobacco  Allergies  Meds       Reviewed and updated as needed this visit by Provider         ROS:  Constitutional, HEENT, cardiovascular, pulmonary, GI, , musculoskeletal, neuro, skin, endocrine and psych systems are negative, except as otherwise noted.    OBJECTIVE:     /82 (BP Location: Right arm, Patient Position: Sitting, Cuff Size: Adult Large)  Pulse 95  Temp 96.9  F (36.1  C) (Temporal)  Wt 203 lb 8 oz (92.3 kg)  SpO2 100%  BMI 31.87  kg/m2  Body mass index is 31.87 kg/(m^2).  GENERAL: healthy, alert and no distress  RESP: lungs clear to auscultation - no rales, rhonchi or wheezes  CV: regular rate and rhythm, normal S1 S2, no S3 or S4, no murmur, click or rub, no peripheral edema and peripheral pulses strong  MS: no gross musculoskeletal defects noted, no edema  MS: decreased range of motion due to pain in her Lt popliteal space , no edema, peripheral pulses normal and tenderness to palpation in her proximal calf and popliteal space.   SKIN: no suspicious lesions or rashes  NEURO: Normal strength and tone, mentation intact and speech normal    Diagnostic Test Results:  Venous ultrasound-neg for thrombosis.     ASSESSMENT/PLAN:           ICD-10-CM    1. Pain of left lower extremity M79.605    2. Deep vein thrombosis (DVT) of left lower extremity, unspecified chronicity, unspecified vein (H) I82.402        See Patient Instructions    Tacho Hernandez MD  [facility]

## 2018-05-21 NOTE — MR AVS SNAPSHOT
After Visit Summary   5/21/2018    Roxane Lopes    MRN: [id number]           Patient Information     Date Of Birth          1975        Visit Information        Provider Department      5/21/2018 11:30 AM Tacho Hernandez MD [facility]        Today's Diagnoses     Pain of left lower extremity    -  1    Deep vein thrombosis (DVT) of left lower extremity, unspecified chronicity, unspecified vein (H)          Care Instructions      Acute Pain, Uncertain Cause  Pain can be caused by many conditions that range from very minor to very serious. In some cases, though, pain comes and goes with no apparent cause.  We were not able to find the exact cause for your pain. At this time there is no sign of any serious illness causing your pain. More tests may be needed to determine the cause. In many cases, pain like this goes away by itself.  Home care  Take any medicines as prescribed. If another medicine was not prescribed for pain, you can take an over-the-counter pain medicine such as ibuprofen or acetaminophen. Use these as directed on the label.    Follow-up care  Follow up with your healthcare provider or our staff as directed.  When to seek medical advice  Call your healthcare provider for any of the following:    Pain changes in pattern    Pain doesn't lessen or gets worse    New symptoms appear    Fever of 100.4 F (38 C) or higher, or as directed by your healthcare provider  Date Last Reviewed: 7/26/2015 2000-2017 [employer] [address] [place] [address]. All rights reserved. This information is not intended as a substitute for professional medical care. Always follow your healthcare professional's instructions.    Resrt, ice or heat, limit standign and walkinmg, elevaste involved extremity, and trial of Tylenol #3 as prescribed. Follolu p with Adelaide Orozco if not improving.             Follow-ups after your visit        Who to contact     If you have  questions or need follow up information about today's clinic visit or your schedule please contact [facility] directly at [phone].  Normal or non-critical lab and imaging results will be communicated to you by [employer], letter or phone within 4 business days after the clinic has received the results. If you do not hear from us within 7 days, please contact the clinic through [employer] or phone. If you have a critical or abnormal lab result, we will notify you by phone as soon as possible.  Submit refill requests through [employer] or call your pharmacy and they will forward the refill request to us. Please allow 3 business days for your refill to be completed.          Additional Information About Your Visit        [employer] Information     [employer] gives you secure access to your electronic health record. If you see a primary care provider, you can also send messages to your care team and make appointments. If you have questions, please call your primary care clinic.  If you do not have a primary care provider, please call [phone] and they will assist you.      [employer] is an electronic gateway that provides easy, online access to your medical records. With [employer], you can request a clinic appointment, read your test results, renew a prescription or communicate with your care team.     To access your existing account, please contact your [facility] or call [phone] for assistance.        Care EveryWhere ID     This is your Care EveryWhere ID. This could be used by other organizations to access your [place] medical records  [id number]        Your Vitals Were     Pulse Temperature Pulse Oximetry BMI (Body Mass Index)          95 96.9  F (36.1  C) (Temporal) 100% 31.87 kg/m2         Blood Pressure from Last 3 Encounters:   05/21/18 120/82   09/27/17 (!) 132/92   01/13/17 (!) 139/91    Weight from Last 3 Encounters:   05/21/18 203 lb 8 oz (92.3 kg)   09/27/17 194  lb (88 kg)   01/13/17 186 lb (84.4 kg)                 Today's Medication Changes          These changes are accurate as of 5/21/18  1:12 PM.  If you have any questions, ask your nurse or doctor.               These medicines have changed or have updated prescriptions.        Dose/Directions    ibuprofen 800 MG tablet   Commonly known as:  ADVIL/MOTRIN   This may have changed:  Another medication with the same name was removed. Continue taking this medication, and follow the directions you see here.   Used for:  Pain of left lower extremity   Changed by:  Tacho Hernandez MD        Dose:  800 mg   Take 1 tablet (800 mg) by mouth every 8 hours as needed for moderate pain   Quantity:  30 tablet   Refills:  1         Stop taking these medicines if you haven't already. Please contact your care team if you have questions.     loratadine 10 MG tablet   Commonly known as:  CLARITIN   Stopped by:  Tacho Hernandez MD                    Primary Care Provider Office Phone # Fax #    Miguelina Hammond Rakan, APRN [facility] [phone]       [address]  [place] [address]        Equal Access to Services     [facility]: Hadii aad ku hadasho Soomaali, waaxda luqadaha, qaybta kaalmada adeegyada, waxay gilson haymiriam prescott . So [facility] [phone].    ATENCIÓN: Si habla español, tiene a purvis disposición servicios gratuitos de asistencia lingüística. Llame al [phone].    We comply with applicable federal civil rights laws and [place] laws. We do not discriminate on the basis of race, color, national origin, age, disability, sex, sexual orientation, or gender identity.            Thank you!     Thank you for choosing [facility]  for your care. Our goal is always to provide you with excellent care. Hearing back from our patients is one way we can continue to improve our services. Please take a few minutes to complete the written survey that you may receive in the mail  after your visit with us. Thank you!             Your Updated Medication List - Protect others around you: Learn how to safely use, store and throw away your medicines at www.disposemymeds.org.          This list is accurate as of 5/21/18  1:12 PM.  Always use your most recent med list.                   Brand Name Dispense Instructions for use Diagnosis    ibuprofen 800 MG tablet    ADVIL/MOTRIN    30 tablet    Take 1 tablet (800 mg) by mouth every 8 hours as needed for moderate pain    Pain of left lower extremity       venlafaxine 75 MG 24 hr capsule    EFFEXOR-XR    30 capsule    TAKE 1 CAPSULE (75 MG) BY MOUTH DAILY    Depression with anxiety

## 2018-05-22 ENCOUNTER — TELEPHONE (OUTPATIENT)
Dept: PEDIATRICS | Facility: CLINIC | Age: 43
End: 2018-05-22

## 2018-05-22 NOTE — TELEPHONE ENCOUNTER
Called patient and gave message per Dr. Hernandez.  Patient verbalized understanding and agreement to plan. Yola Andrews RN, [facility] Lower Extremity Venous Duplex Left   Status:  Final result   Visible to patient:  Yes (MyChart) Dx:  Pain of left lower extremity; Deep ve... Order: [id number]       Notes Recorded by Tacho Hernandez MD on 5/21/2018 at 2:40 PM  1.  No evidence left lower extremity deep venous thrombosis.

## 2018-05-23 ENCOUNTER — TELEPHONE (OUTPATIENT)
Dept: PEDIATRICS | Facility: CLINIC | Age: 43
End: 2018-05-23

## 2018-05-23 DIAGNOSIS — R25.2 CRAMPS OF LOWER EXTREMITY: Primary | ICD-10-CM

## 2018-05-23 RX ORDER — CYCLOBENZAPRINE HCL 10 MG
10 TABLET ORAL 2 TIMES DAILY PRN
Qty: 30 TABLET | Refills: 0 | Status: SHIPPED | OUTPATIENT
Start: 2018-05-23 | End: 2018-06-19

## 2018-05-23 NOTE — TELEPHONE ENCOUNTER
Left message:  Patient informed RX sent to [facility] pharmacy.      cyclobenzaprine (FLEXERIL) 10 MG tablet 30 tablet 0 5/23/2018        Sig - Route: Take 1 tablet (10 mg) by mouth 2 times daily as needed for muscle spasms - Oral      Class: E-Prescribe      Order: [id number]      E-Prescribing Status: Receipt confirmed by pharmacy (5/23/2018  3:07 PM CDT)        Printout Tracking      External Result Report       Pharmacy      [facility]/PHARMACY #[id number] - [facility], [place] - [address]           Vivian Garcia RN,   [facility]

## 2018-05-23 NOTE — TELEPHONE ENCOUNTER
Reason for call:  Other   Patient called regarding (reason for call): call back  Additional comments: is having bad leg cramping.  Wondering if she can get a muscle relaxer prescribed.  Saw Dr Hernandez.  Please call patient to discuss.[facility]/PHARMACY #[id number] - [facility], [place] - [address].    Phone number to reach patient:  Cell number on file:    Telephone Information:   Mobile [phone]       Best Time:  any    Can we leave a detailed message on this number?  YES

## 2018-05-23 NOTE — TELEPHONE ENCOUNTER
Routed to Dr. Hernandez who saw patient on 5/21/18    Additional comments: is having bad leg cramping.  Wondering if she can get a muscle relaxer prescribed    Vivian Garcia RN,   [facility]

## 2018-06-11 ENCOUNTER — TELEPHONE (OUTPATIENT)
Dept: PEDIATRICS | Facility: CLINIC | Age: 43
End: 2018-06-11

## 2018-06-11 DIAGNOSIS — M79.662 PAIN OF LEFT LOWER LEG: ICD-10-CM

## 2018-06-11 NOTE — TELEPHONE ENCOUNTER
Routing refill request to provider for review/approval because:  Drug not on the [facility] refill protocol     acetaminophen-codeine (TYLENOL #3) 300-30 MG per tablet 35 tablet 0 5/21/2018  --   Sig - Route: Take 1 tablet by mouth every 6 hours as needed for moderate to      Last OV with prescribing provider Dr. Hernandez: 5/21/2018    No future apts.     Deloris Whitman RN

## 2018-06-11 NOTE — TELEPHONE ENCOUNTER
Patient would like to request a refill for acetaminophen-codeine (TYLENOL #3) 300-30 MG per tablet [30054] (Order [id number]) .  Patient uses the [facility] on [address] on [address]. Please advise.

## 2018-06-12 ENCOUNTER — TELEPHONE (OUTPATIENT)
Dept: PEDIATRICS | Facility: CLINIC | Age: 43
End: 2018-06-12

## 2018-06-12 NOTE — TELEPHONE ENCOUNTER
Health Call Center    Phone Message    May a detailed message be left on voicemail: yes    Reason for Call: Medication Question or concern regarding medication   Prescription Clarification  Name of Medication:   acetaminophen-codeine (TYLENOL #3) 300-30 MG per tablet 35 tablet 0       Prescribing Provider: Dr. Hernandez   Pharmacy: [facility]   What on the order needs clarification? Please shred hard copy and send Rx electronically to pharmacy. Please advise.          Action Taken: Message routed to:  Primary Care p [phone]

## 2018-06-12 NOTE — TELEPHONE ENCOUNTER
acetaminophen-codeine (TYLENOL #3) 300-30 MG per tablet faxed to [facility] at fax #: [phone]. Received confirmation from [employer] that fax was sent successfully.  Natalya Ross CMA

## 2018-06-12 NOTE — TELEPHONE ENCOUNTER
Pharmacy is calling requesting to shred hard copy and to fax over to [facility] in [place]. Please advise.

## 2018-06-12 NOTE — TELEPHONE ENCOUNTER
Patient is calling to follow up on refill request. She states she is in a lot of pain and needs the medication. Please advise.

## 2018-06-19 ENCOUNTER — TELEPHONE (OUTPATIENT)
Dept: PEDIATRICS | Facility: CLINIC | Age: 43
End: 2018-06-19

## 2018-06-19 DIAGNOSIS — R25.2 CRAMPS OF LOWER EXTREMITY: ICD-10-CM

## 2018-06-19 RX ORDER — ROPINIROLE 0.25 MG/1
0.25 TABLET, FILM COATED ORAL AT BEDTIME
Qty: 30 TABLET | Refills: 1 | Status: SHIPPED | OUTPATIENT
Start: 2018-06-19 | End: 2018-06-19

## 2018-06-19 RX ORDER — CYCLOBENZAPRINE HCL 10 MG
10 TABLET ORAL 2 TIMES DAILY PRN
Qty: 30 TABLET | Refills: 0 | Status: CANCELLED | OUTPATIENT
Start: 2018-06-19

## 2018-06-19 RX ORDER — CYCLOBENZAPRINE HCL 10 MG
10 TABLET ORAL 2 TIMES DAILY PRN
Qty: 30 TABLET | Refills: 0 | Status: SHIPPED | OUTPATIENT
Start: 2018-06-19 | End: 2018-10-04

## 2018-06-19 NOTE — TELEPHONE ENCOUNTER
Routing refill request to provider for review/approval because:  Drug not on the [facility] refill protocol     cyclobenzaprine (FLEXERIL) 10 MG tablet 30 tablet 0 5/23/2018  --   Sig - Route: Take 1 tablet (10 mg) by mouth 2 times daily as needed for muscle spasms - Oral     Last OV with prescribing provider Dr. Hernandez:  5/21/2018    No future apts.     Deloris Whitman RN

## 2018-06-22 NOTE — TELEPHONE ENCOUNTER
cyclobenzaprine (FLEXERIL) 10 MG tablet 30 tablet 0 6/19/2018  No   Sig - Route: Take 1 tablet (10 mg) by mouth 2 times daily as needed for muscle spasms - Oral     Tia BORA Whitman

## 2018-07-18 ENCOUNTER — OFFICE VISIT (OUTPATIENT)
Dept: FAMILY MEDICINE | Facility: CLINIC | Age: 43
End: 2018-07-18
Payer: COMMERCIAL

## 2018-07-18 VITALS
WEIGHT: 205 LBS | DIASTOLIC BLOOD PRESSURE: 80 MMHG | BODY MASS INDEX: 32.18 KG/M2 | OXYGEN SATURATION: 98 % | SYSTOLIC BLOOD PRESSURE: 120 MMHG | TEMPERATURE: 98 F | HEART RATE: 87 BPM | HEIGHT: 67 IN

## 2018-07-18 DIAGNOSIS — F41.8 DEPRESSION WITH ANXIETY: ICD-10-CM

## 2018-07-18 DIAGNOSIS — H65.01 RIGHT ACUTE SEROUS OTITIS MEDIA, RECURRENCE NOT SPECIFIED: Primary | ICD-10-CM

## 2018-07-18 DIAGNOSIS — R22.0 RIGHT FACIAL SWELLING: ICD-10-CM

## 2018-07-18 PROCEDURE — 99214 OFFICE O/P EST MOD 30 MIN: CPT | Performed by: PREVENTIVE MEDICINE

## 2018-07-18 RX ORDER — VENLAFAXINE HYDROCHLORIDE 75 MG/1
CAPSULE, EXTENDED RELEASE ORAL
Qty: 30 CAPSULE | Refills: 0 | Status: SHIPPED | OUTPATIENT
Start: 2018-07-18 | End: 2018-09-11

## 2018-07-18 RX ORDER — AMOXICILLIN AND CLAVULANATE POTASSIUM 500; 125 MG/1; MG/1
1 TABLET, FILM COATED ORAL 3 TIMES DAILY
Qty: 21 TABLET | Refills: 0 | Status: SHIPPED | OUTPATIENT
Start: 2018-07-18 | End: 2018-07-25

## 2018-07-18 RX ORDER — AMOXICILLIN 500 MG/1
CAPSULE ORAL
Refills: 0 | COMMUNITY
Start: 2017-11-22 | End: 2018-10-04

## 2018-07-18 RX ORDER — PREDNISONE 20 MG/1
20 TABLET ORAL 2 TIMES DAILY
Qty: 10 TABLET | Refills: 0 | Status: SHIPPED | OUTPATIENT
Start: 2018-07-18 | End: 2018-10-04

## 2018-07-18 ASSESSMENT — PAIN SCALES - GENERAL: PAINLEVEL: EXTREME PAIN (8)

## 2018-07-18 ASSESSMENT — ANXIETY QUESTIONNAIRES
3. WORRYING TOO MUCH ABOUT DIFFERENT THINGS: MORE THAN HALF THE DAYS
2. NOT BEING ABLE TO STOP OR CONTROL WORRYING: MORE THAN HALF THE DAYS
1. FEELING NERVOUS, ANXIOUS, OR ON EDGE: MORE THAN HALF THE DAYS
6. BECOMING EASILY ANNOYED OR IRRITABLE: MORE THAN HALF THE DAYS
GAD7 TOTAL SCORE: 10
5. BEING SO RESTLESS THAT IT IS HARD TO SIT STILL: SEVERAL DAYS
IF YOU CHECKED OFF ANY PROBLEMS ON THIS QUESTIONNAIRE, HOW DIFFICULT HAVE THESE PROBLEMS MADE IT FOR YOU TO DO YOUR WORK, TAKE CARE OF THINGS AT HOME, OR GET ALONG WITH OTHER PEOPLE: SOMEWHAT DIFFICULT
7. FEELING AFRAID AS IF SOMETHING AWFUL MIGHT HAPPEN: NOT AT ALL

## 2018-07-18 ASSESSMENT — PATIENT HEALTH QUESTIONNAIRE - PHQ9: 5. POOR APPETITE OR OVEREATING: SEVERAL DAYS

## 2018-07-18 NOTE — MR AVS SNAPSHOT
After Visit Summary   7/18/2018    Roxane Lopes    MRN: [id number]           Patient Information     Date Of Birth          1975        Visit Information        Provider Department      7/18/2018 9:20 AM Monique Lindo MD [facility]        Today's Diagnoses     Right acute serous otitis media, recurrence not specified    -  1    Right facial swelling          Care Instructions    At [facility], we strive to deliver an exceptional experience to you, every time we see you.  If you receive a survey in the mail, please send us back your thoughts. We really do value your feedback.    Based on your medical history, these are the current health maintenance/preventive care services that you are due for (some may have been done at this visit.)  Health Maintenance Due   Topic Date Due     HIV SCREEN (SYSTEM ASSIGNED)  04/16/1995     DEPRESSION ACTION PLAN Q1 YR  06/15/2016     PHQ-9 Q6 MONTHS  03/01/2017     PAP Q3 YR  10/16/2017       Suggested websites for health information:  Www.[employer] [facility].[employer] : Up to date and easily searchable information on multiple topics.  Www.medlineplus.gov : medication info, interactive tutorials, watch real surgeries online  Www.familydoctor.org : good info from the Academy of Family Physicians  Www.cdc.gov : public health info, travel advisories, epidemics (H1N1)  Www.aap.org : children's health info, normal development, vaccinations  Www.health.state.[place].us : [place] dept of health, public health issues in [place], N1N1    Your care team:                            Family Medicine Internal Medicine   MD Charles Felton MD Shantel Branch-Fleming, MD Katya Georgiev PA-C Megan Hill, MEENAKSHI Garcia MD Pediatrics   DAVE Roland, MD Marianela Lynch APRN CNP   MD Fay Hutner MD Deborah Mielke, MD Kim Thein, APRN GEOVANI      Clinic hours: Monday - Thursday 7 am-7 pm;  "Fridays 7 am-5 pm.   Urgent care: Monday - Friday 11 am-9 pm; Saturday and Sunday 9 am-5 pm.  Pharmacy : Monday -Thursday 8 am-8 pm; Friday 8 am-6 pm; Saturday and Sunday 9 am-5 pm.     Clinic: [phone]   Pharmacy: [phone]              Follow-ups after your visit        Future tests that were ordered for you today     Open Future Orders        Priority Expected Expires Ordered    CT Facial Bones without Contrast Routine  7/18/2019 7/18/2018            Who to contact     If you have questions or need follow up information about today's clinic visit or your schedule please contact [facility] directly at [phone].  Normal or non-critical lab and imaging results will be communicated to you by MyChart, letter or phone within 4 business days after the clinic has received the results. If you do not hear from us within 7 days, please contact the clinic through MyChart or phone. If you have a critical or abnormal lab result, we will notify you by phone as soon as possible.  Submit refill requests through [employer] or call your pharmacy and they will forward the refill request to us. Please allow 3 business days for your refill to be completed.          Additional Information About Your Visit        MyChart Information     [employer] gives you secure access to your electronic health record. If you see a primary care provider, you can also send messages to your care team and make appointments. If you have questions, please call your primary care clinic.  If you do not have a primary care provider, please call [phone] and they will assist you.        Care EveryWhere ID     This is your Care EveryWhere ID. This could be used by other organizations to access your [facility] medical records  [id number]        Your Vitals Were     Pulse Temperature Height Last Period Pulse Oximetry Breastfeeding?    87 98  F (36.7  C) (Oral) 5' 7\" (1.702 m) 07/14/2018 (Exact Date) 98% No    BMI (Body Mass Index) "                   32.11 kg/m2            Blood Pressure from Last 3 Encounters:   07/18/18 120/80   05/21/18 120/82   09/27/17 (!) 132/92    Weight from Last 3 Encounters:   07/18/18 205 lb (93 kg)   05/21/18 203 lb 8 oz (92.3 kg)   09/27/17 194 lb (88 kg)                 Today's Medication Changes          These changes are accurate as of 7/18/18  9:58 AM.  If you have any questions, ask your nurse or doctor.               Start taking these medicines.        Dose/Directions    amoxicillin-clavulanate 500-125 MG per tablet   Commonly known as:  AUGMENTIN   Used for:  Right acute serous otitis media, recurrence not specified, Right facial swelling   Started by:  Monique Lindo MD        Dose:  1 tablet   Take 1 tablet by mouth 3 times daily for 7 days   Quantity:  21 tablet   Refills:  0       diclofenac 50 MG EC tablet   Commonly known as:  VOLTAREN   Used for:  Right acute serous otitis media, recurrence not specified, Right facial swelling   Started by:  Monique Lindo MD        Dose:  50 mg   Take 1 tablet (50 mg) by mouth 3 times daily as needed for moderate pain   Quantity:  30 tablet   Refills:  1       predniSONE 20 MG tablet   Commonly known as:  DELTASONE   Used for:  Right acute serous otitis media, recurrence not specified, Right facial swelling   Started by:  Monique Lindo MD        Dose:  20 mg   Take 1 tablet (20 mg) by mouth 2 times daily   Quantity:  10 tablet   Refills:  0         These medicines have changed or have updated prescriptions.        Dose/Directions    acetaminophen-codeine 300-30 MG per tablet   Commonly known as:  TYLENOL #3   This may have changed:  reasons to take this   Used for:  Right acute serous otitis media, recurrence not specified, Right facial swelling   Changed by:  Monique Lindo MD        Dose:  1 tablet   Take 1 tablet by mouth every 6 hours as needed for severe pain   Quantity:  6 tablet   Refills:  0            Where to get your medicines      These medications  were sent to [facility]/pharmacy #[id number] - [place], [place] - [address]  [address] [place] [place] [facility] [place] [address]     Phone:  [phone]     amoxicillin-clavulanate 500-125 MG per tablet    diclofenac 50 MG EC tablet    predniSONE 20 MG tablet         Some of these will need a paper prescription and others can be bought over the counter.  Ask your nurse if you have questions.     Bring a paper prescription for each of these medications     acetaminophen-codeine 300-30 MG per tablet               Information about OPIOIDS     PRESCRIPTION OPIOIDS: WHAT YOU NEED TO KNOW   We gave you an opioid (narcotic) pain medicine. It is important to manage your pain, but opioids are not always the best choice. You should first try all the other options your care team gave you. Take this medicine for as short a time (and as few doses) as possible.     These medicines have risks:    DO NOT drive when on new or higher doses of pain medicine. These medicines can affect your alertness and reaction times, and you could be arrested for driving under the influence (DUI). If you need to use opioids long-term, talk to your care team about driving.    DO NOT operate heave machinery    DO NOT do any other dangerous activities while taking these medicines.     DO NOT drink any alcohol while taking these medicines.      If the opioid prescribed includes acetaminophen, DO NOT take with any other medicines that contain acetaminophen. Read all labels carefully. Look for the word  acetaminophen  or  Tylenol.  Ask your pharmacist if you have questions or are unsure.    You can get addicted to pain medicines, especially if you have a history of addiction (chemical, alcohol or substance dependence). Talk to your care team about ways to reduce this risk.    Store your pills in a secure place, locked if possible. We will not replace any lost or stolen medicine. If you don t finish your medicine, please throw away (dispose) as directed by  your pharmacist. The [place] Pollution Control Agency has more information about safe disposal: https://www.pca.[facility].[place].us/living-green/managing-unwanted-medications.     All opioids tend to cause constipation. Drink plenty of water and eat foods that have a lot of fiber, such as fruits, vegetables, prune juice, apple juice and high-fiber cereal. Take a laxative (Miralax, milk of magnesia, Colace, Senna) if you don t move your bowels at least every other day.          Primary Care Provider Office Phone # Fax #    Miguelina Bledsoe, APRN [facility] [phone]       [address]  [place] [address]        Equal Access to Services     RADHAMES BOYKIN : Hadii marcy rodríguezo Sodaxa, waaxda luqadaha, qaybta kaalmada adeevelynyada, susy prescott . So [facility] [phone].    ATENCIÓN: Si habla español, tiene a purvis disposición servicios gratuitos de asistencia lingüística. Llame al [phone].    We comply with applicable federal civil rights laws and [place] laws. We do not discriminate on the basis of race, color, national origin, age, disability, sex, sexual orientation, or gender identity.            Thank you!     Thank you for choosing [facility]  for your care. Our goal is always to provide you with excellent care. Hearing back from our patients is one way we can continue to improve our services. Please take a few minutes to complete the written survey that you may receive in the mail after your visit with us. Thank you!             Your Updated Medication List - Protect others around you: Learn how to safely use, store and throw away your medicines at www.disposemymeds.org.          This list is accurate as of 7/18/18  9:58 AM.  Always use your most recent med list.                   Brand Name Dispense Instructions for use Diagnosis    acetaminophen-codeine 300-30 MG per tablet    TYLENOL #3    6 tablet    Take 1 tablet by mouth every 6 hours as  needed for severe pain    Right acute serous otitis media, recurrence not specified, Right facial swelling       amoxicillin 500 MG capsule    AMOXIL     TAKE 2 CAPSULES (1,000 MG) BY MOUTH TWICE A DAY FOR 10 DAYS.        amoxicillin-clavulanate 500-125 MG per tablet    AUGMENTIN    21 tablet    Take 1 tablet by mouth 3 times daily for 7 days    Right acute serous otitis media, recurrence not specified, Right facial swelling       cyclobenzaprine 10 MG tablet    FLEXERIL    30 tablet    Take 1 tablet (10 mg) by mouth 2 times daily as needed for muscle spasms    Cramps of lower extremity       diclofenac 50 MG EC tablet    VOLTAREN    30 tablet    Take 1 tablet (50 mg) by mouth 3 times daily as needed for moderate pain    Right acute serous otitis media, recurrence not specified, Right facial swelling       ibuprofen 800 MG tablet    ADVIL/MOTRIN    30 tablet    Take 1 tablet (800 mg) by mouth every 8 hours as needed for moderate pain    Pain of left lower extremity       predniSONE 20 MG tablet    DELTASONE    10 tablet    Take 1 tablet (20 mg) by mouth 2 times daily    Right acute serous otitis media, recurrence not specified, Right facial swelling       venlafaxine 75 MG 24 hr capsule    EFFEXOR-XR    30 capsule    TAKE 1 CAPSULE (75 MG) BY MOUTH DAILY    Depression with anxiety

## 2018-07-18 NOTE — PATIENT INSTRUCTIONS
At [facility], we strive to deliver an exceptional experience to you, every time we see you.  If you receive a survey in the mail, please send us back your thoughts. We really do value your feedback.    Based on your medical history, these are the current health maintenance/preventive care services that you are due for (some may have been done at this visit.)  Health Maintenance Due   Topic Date Due     HIV SCREEN (SYSTEM ASSIGNED)  04/16/1995     DEPRESSION ACTION PLAN Q1 YR  06/15/2016     PHQ-9 Q6 MONTHS  03/01/2017     PAP Q3 YR  10/16/2017       Suggested websites for health information:  Www.[facility].org : Up to date and easily searchable information on multiple topics.  Www.[employer].gov : medication info, interactive tutorials, watch real surgeries online  Www.familydoctor.org : good info from the Academy of Family Physicians  Www.cdc.gov : public health info, travel advisories, epidemics (H1N1)  Www.aap.org : children's health info, normal development, vaccinations  Www.health.[facility].[place].us : [place] dept of health, public health issues in [place], N1N1    Your care team:                            Family Medicine Internal Medicine   MD Charles Felton MD Shantel Branch-Fleming, MD Katya Georgiev PA-C Megan Hill, APRSHAHEEN Garcia MD Pediatrics   DAVE Roland, MD Marianela Lynch APRN CNP   MD Fay Hunter MD Deborah Mielke, MD Kim Thein, APRN [facility]      Clinic hours: Monday - Thursday 7 am-7 pm; Fridays 7 am-5 pm.   Urgent care: Monday - Friday 11 am-9 pm; Saturday and Sunday 9 am-5 pm.  Pharmacy : Monday -Thursday 8 am-8 pm; Friday 8 am-6 pm; Saturday and Sunday 9 am-5 pm.     Clinic: [phone]   Pharmacy: [phone]

## 2018-07-18 NOTE — PROGRESS NOTES
SUBJECTIVE:   Roxane Lopes is a 43 year old female who presents to clinic today for the following health issues:      RESPIRATORY SYMPTOMS      Duration: x 6 days    Description  ear pain right    Severity: severe    Accompanying signs and symptoms: None    History (predisposing factors):  See at [facility] Saturday    Precipitating or alleviating factors: None    Therapies tried and outcome:  IBU and Amox, not helping    Right side of neck having pain  Pain along the side of the face  No ear drainage  Impaired glucose+   Severe pain, states it keeps her up at night. Requesting Tylenol 3 for the pain   Also needs refills on her anxiety medication, has been getting refills outside of [place], now with insurance change will follow up through [place]     Seen at [facility] 7/14/18:    HPI   Earache   The right side is affected. There is no abnormality behind the ear. Symptoms are described as throbbing. The patient reports moderate pain. Onset was sudden. This problem has existed for 3 days. This problem occurs constantly. Since onset, the problem has been worsening. The problem is new. The problem is the result of Negative for a direct blow, a change in elevation, a foreign body and water. Symptoms are relieved by OTC medication. Associated symptoms include headaches and ear pain. Negative for congestion, fever, sore throat, cough, tinnitus, dizziness, hearing loss, ear discharge, neck pain and rash. Risk factors include Negative for elevation change, chronic ear infection, prior ear surgery, SCUBA/diving, trauma, water and recurrent OM.      1. Acute suppurative otitis media of right ear without spontaneous rupture of tympanic membrane, recurrence not specified    - amoxicillin (AMOXIL) 875 MG tablet; Take 1 tablet (875 mg total) by mouth 2 (two) times a day for 7 days. Dispense: 14 tablet; Refill: 0  - ibuprofen (ADVIL,MOTRIN) 800 MG tablet; Take 1 tablet (800 mg total) by mouth every 8 (eight) hours as  needed for pain for up to 10 days. Dispense: 30 tablet; Refill: 0       Depression and Anxiety Follow-Up    Status since last visit: No change    Other associated symptoms:None    Complicating factors:     Significant life event: No     Current substance abuse: None    PHQ-9 10/12/2015 9/1/2016   Total Score 0 7   Q9: Suicide Ideation Not at all Not at all     OLLIE-7 SCORE 10/18/2013 10/12/2015 9/1/2016   Total Score 1 - -   Total Score - 1 0     In the past two weeks have you had thoughts of suicide or self-harm?  No.    Do you have concerns about your personal safety or the safety of others?   No  PHQ-9  English  PHQ-9   Any Language  OLLIE-7  Suicide Assessment Five-step Evaluation and Treatment (SAFE-T)    Problem list and histories reviewed & adjusted, as indicated.  Additional history: as documented    Patient Active Problem List   Diagnosis     Varicose veins     Left leg pain     DVT (deep venous thrombosis) (H)     PTSD (post-traumatic stress disorder)     Tobacco use disorder     Venous insufficiency of leg-left     Prediabetes     Thumb injury     Anxiety     Gastroesophageal reflux disease without esophagitis     Galactorrhea of left breast     Generalized hyperhidrosis     Fatigue     CARDIOVASCULAR SCREENING; LDL GOAL LESS THAN 160     Depression with anxiety     Past Surgical History:   Procedure Laterality Date     LAPAROSCOPIC CHOLECYSTECTOMY  10/9/2012    Procedure: LAPAROSCOPIC CHOLECYSTECTOMY;  Laparoscopic Cholecystectomy;  Surgeon: Martell Briscoe MD;  Location: [facility] OR     LAPAROSCOPIC TUBAL LIGATION         Social History   Substance Use Topics     Smoking status: Former Smoker     Packs/day: 0.50     Years: 10.00     Types: Cigarettes     Quit date: 10/16/2012     Smokeless tobacco: Never Used      Comment: on e cigs now trying to quit     Alcohol use Yes      Comment: Maybe once a year     Family History   Problem Relation Age of Onset     Diabetes       Diabetes Maternal Grandfather       Other - See Comments Maternal Grandmother      24 yrs old blood clot     C.A.D. No family hx of      Cerebrovascular Disease No family hx of      Hypertension No family hx of      Anesthesia Reaction No family hx of      Arthritis No family hx of      Asthma No family hx of      Breast Cancer No family hx of      Cancer - colorectal No family hx of      Prostate Cancer No family hx of      Thyroid Disease No family hx of      Lipids No family hx of      Congenital Anomalies No family hx of      Depression Mother          Current Outpatient Prescriptions   Medication Sig Dispense Refill     acetaminophen-codeine (TYLENOL #3) 300-30 MG per tablet Take 1 tablet by mouth every 6 hours as needed for severe pain 6 tablet 0     amoxicillin-clavulanate (AUGMENTIN) 500-125 MG per tablet Take 1 tablet by mouth 3 times daily for 7 days 21 tablet 0     cyclobenzaprine (FLEXERIL) 10 MG tablet Take 1 tablet (10 mg) by mouth 2 times daily as needed for muscle spasms 30 tablet 0     diclofenac (VOLTAREN) 50 MG EC tablet Take 1 tablet (50 mg) by mouth 3 times daily as needed for moderate pain 30 tablet 1     ibuprofen (ADVIL/MOTRIN) 800 MG tablet Take 1 tablet (800 mg) by mouth every 8 hours as needed for moderate pain 30 tablet 1     predniSONE (DELTASONE) 20 MG tablet Take 1 tablet (20 mg) by mouth 2 times daily 10 tablet 0     venlafaxine (EFFEXOR-XR) 75 MG 24 hr capsule TAKE 1 CAPSULE (75 MG) BY MOUTH DAILY 30 capsule 0     amoxicillin (AMOXIL) 500 MG capsule TAKE 2 CAPSULES (1,000 MG) BY MOUTH TWICE A DAY FOR 10 DAYS.  0     [DISCONTINUED] venlafaxine (EFFEXOR-XR) 75 MG 24 hr capsule TAKE 1 CAPSULE (75 MG) BY MOUTH DAILY 30 capsule 0     Allergies   Allergen Reactions     Chantix [Varenicline Tartrate] Anxiety     BP Readings from Last 3 Encounters:   07/18/18 120/80   05/21/18 120/82   09/27/17 (!) 132/92    Wt Readings from Last 3 Encounters:   07/18/18 205 lb (93 kg)   05/21/18 203 lb 8 oz (92.3 kg)   09/27/17 194 lb (88 kg)  "                 Labs reviewed in EPIC    Reviewed and updated as needed this visit by clinical staff  Allergies       Reviewed and updated as needed this visit by Provider         ROS:  Constitutional, neuro, ENT, endocrine, pulmonary, cardiac, gastrointestinal, genitourinary, musculoskeletal, integument and psychiatric systems are negative, except as otherwise noted.    OBJECTIVE:                                                    /80  Pulse 87  Temp 98  F (36.7  C) (Oral)  Ht 5' 7\" (1.702 m)  Wt 205 lb (93 kg)  LMP 07/14/2018 (Exact Date)  SpO2 98%  Breastfeeding? No  BMI 32.11 kg/m2  Body mass index is 32.11 kg/(m^2).  GENERAL APPEARANCE: alert  EYES: Eyes grossly normal to inspection and conjunctivae and sclerae normal  HENT: nose and mouth without ulcers or lesions, no pharyngeal exudates or pus points, no uvular deviation. Ears on left side are normal, Right side TM is intact, no erythema, mild air fluid level   NECK: edema and swelling over the right side of the neck extending towards the mandible, no skin changes, no fluctuance. Tender anterior to the ear also.   RESP: lungs clear to auscultation - no rales, rhonchi or wheezes  CV: regular rates and rhythm, normal S1 S2, no S3 or S4 and no murmur, click or rub  ABDOMEN: soft, nontender, without rebound or guarding   MS: extremities normal- no gross deformities noted  SKIN: no suspicious lesions or rashes  NEURO: Normal strength and tone, mentation intact and speech normal  PSYCH: mentation appears normal    Diagnostic test results:  Diagnostic Test Results:  No results found for this or any previous visit (from the past 24 hour(s)).     ASSESSMENT/PLAN:                                                    1. Right acute serous otitis media, recurrence not specified  -No erythema of the TM on exam  - amoxicillin-clavulanate (AUGMENTIN) 500-125 MG per tablet; Take 1 tablet by mouth 3 times daily for 7 days  Dispense: 21 tablet; Refill: 0  - " predniSONE (DELTASONE) 20 MG tablet; Take 1 tablet (20 mg) by mouth 2 times daily  Dispense: 10 tablet; Refill: 0  - CT Facial Bones without Contrast; Future  - diclofenac (VOLTAREN) 50 MG EC tablet; Take 1 tablet (50 mg) by mouth 3 times daily as needed for moderate pain  Dispense: 30 tablet; Refill: 1  - acetaminophen-codeine (TYLENOL #3) 300-30 MG per tablet; Take 1 tablet by mouth every 6 hours as needed for severe pain  Dispense: 6 tablet; Refill: 0    2. Right facial swelling  -I am concerned about the facial swelling hence CT scan ordered   - amoxicillin-clavulanate (AUGMENTIN) 500-125 MG per tablet; Take 1 tablet by mouth 3 times daily for 7 days  Dispense: 21 tablet; Refill: 0  - predniSONE (DELTASONE) 20 MG tablet; Take 1 tablet (20 mg) by mouth 2 times daily  Dispense: 10 tablet; Refill: 0  - CT Facial Bones without Contrast; Future  - diclofenac (VOLTAREN) 50 MG EC tablet; Take 1 tablet (50 mg) by mouth 3 times daily as needed for moderate pain  Dispense: 30 tablet; Refill: 1  - acetaminophen-codeine (TYLENOL #3) 300-30 MG per tablet; Take 1 tablet by mouth every 6 hours as needed for severe pain  Dispense: 6 tablet; Refill: 0    3. Depression with anxiety  -Refills on medication provided for one month, further per PCP  - venlafaxine (EFFEXOR-XR) 75 MG 24 hr capsule; TAKE 1 CAPSULE (75 MG) BY MOUTH DAILY  Dispense: 30 capsule; Refill: 0      Follow up with Provider - 3 days if not better then needs to be seen by ENT   Due for pap smear, will get with PCP     Monique Lindo MD MPH    [facility]

## 2018-07-19 ASSESSMENT — PATIENT HEALTH QUESTIONNAIRE - PHQ9: SUM OF ALL RESPONSES TO PHQ QUESTIONS 1-9: 18

## 2018-07-19 ASSESSMENT — ANXIETY QUESTIONNAIRES: GAD7 TOTAL SCORE: 10

## 2018-09-11 ENCOUNTER — TELEPHONE (OUTPATIENT)
Dept: PEDIATRICS | Facility: CLINIC | Age: 43
End: 2018-09-11

## 2018-09-11 DIAGNOSIS — F41.8 DEPRESSION WITH ANXIETY: ICD-10-CM

## 2018-09-11 RX ORDER — VENLAFAXINE HYDROCHLORIDE 75 MG/1
CAPSULE, EXTENDED RELEASE ORAL
Qty: 30 CAPSULE | Refills: 0 | Status: SHIPPED | OUTPATIENT
Start: 2018-09-11 | End: 2018-10-04

## 2018-09-11 NOTE — TELEPHONE ENCOUNTER
I have not seen her in over 2 years  Can we see if Dr Lindo would give enough to get to the appointment she has scheduled

## 2018-09-11 NOTE — TELEPHONE ENCOUNTER
Health Call Center    Phone Message    May a detailed message be left on voicemail: yes    Reason for Call: Medication Refill Request    Has the patient contacted the pharmacy for the refill? Yes   Name of medication being requested: venlafaxine (EFFEXOR-XR) 75 MG 24 hr capsule [71988] (Order [id number])     Provider who prescribed the medication: Miguelina Bledsoe  Pharmacy: [facility]/pharmacy #[id number] - [facility], [place] - [address].   Date medication is needed: asap- patient is almost out of pills in two days and is scheduled for an appointment with Miguelina Bledsoe 10-04 and just needs enough medication to last her then. Please advise.         Action Taken: Message routed to:  Primary Care p [phone]

## 2018-09-11 NOTE — TELEPHONE ENCOUNTER
"Routing to provider to please review and advise. Patient has not been seen by DORIS Segura since 9/1/2016. She was seen by Dr. Hernandez at our clinic on 5/21/2018 for LLE pain.     Patient was then seen at the [facility] clinic on 7/18/18 by Dr. Lindo who sent a 30 day refill. Patient had reported at this visit with Dr. Ernandez that she \"has been getting refills outside of [facility], now with insurance change will follow up through [facility]\". During this visit with Dr. Lindo, he advised for her to follow up her PCP and that she was also due for a pap smear.     venlafaxine (EFFEXOR-XR) 75 MG 24 hr capsule 30 capsule 0 7/18/2018  No   Sig: TAKE 1 CAPSULE (75 MG) BY MOUTH DAILY     Next 5 appointments (look out 90 days)     Oct 04, 2018  1:30 PM CDT   PHYSICAL with MEENAKSHI Ramos CNP   [facility]    [address] [place] [id number] [phone]                Please advise if a joaquin refill can be given to patient.     Deloris Whitman RN    "

## 2018-09-12 NOTE — TELEPHONE ENCOUNTER
This writer attempted to contact patient on 09/12/18      Reason for call refill and left detailed message.      Hyacinth Briscoe MA

## 2018-09-21 ENCOUNTER — RADIANT APPOINTMENT (OUTPATIENT)
Dept: GENERAL RADIOLOGY | Facility: CLINIC | Age: 43
End: 2018-09-21
Attending: PHYSICIAN ASSISTANT
Payer: COMMERCIAL

## 2018-09-21 ENCOUNTER — OFFICE VISIT (OUTPATIENT)
Dept: URGENT CARE | Facility: URGENT CARE | Age: 43
End: 2018-09-21
Payer: COMMERCIAL

## 2018-09-21 VITALS
TEMPERATURE: 98.4 F | SYSTOLIC BLOOD PRESSURE: 127 MMHG | RESPIRATION RATE: 20 BRPM | WEIGHT: 210.2 LBS | HEART RATE: 88 BPM | OXYGEN SATURATION: 98 % | BODY MASS INDEX: 32.92 KG/M2 | DIASTOLIC BLOOD PRESSURE: 85 MMHG

## 2018-09-21 DIAGNOSIS — R05.9 COUGH: ICD-10-CM

## 2018-09-21 DIAGNOSIS — Z86.718 H/O DEEP VENOUS THROMBOSIS: ICD-10-CM

## 2018-09-21 DIAGNOSIS — R06.2 WHEEZING: ICD-10-CM

## 2018-09-21 DIAGNOSIS — Z87.01 H/O: PNEUMONIA: ICD-10-CM

## 2018-09-21 DIAGNOSIS — J22 LOWER RESP. TRACT INFECTION: Primary | ICD-10-CM

## 2018-09-21 LAB
BASOPHILS # BLD AUTO: 0 10E9/L (ref 0–0.2)
BASOPHILS NFR BLD AUTO: 0.3 %
D DIMER PPP FEU-MCNC: 0.3 UG/ML FEU (ref 0–0.5)
DIFFERENTIAL METHOD BLD: NORMAL
EOSINOPHIL # BLD AUTO: 0.1 10E9/L (ref 0–0.7)
EOSINOPHIL NFR BLD AUTO: 1.8 %
ERYTHROCYTE [DISTWIDTH] IN BLOOD BY AUTOMATED COUNT: 13.4 % (ref 10–15)
HCT VFR BLD AUTO: 36.6 % (ref 35–47)
HGB BLD-MCNC: 12.6 G/DL (ref 11.7–15.7)
LYMPHOCYTES # BLD AUTO: 2.2 10E9/L (ref 0.8–5.3)
LYMPHOCYTES NFR BLD AUTO: 32.4 %
MCH RBC QN AUTO: 32 PG (ref 26.5–33)
MCHC RBC AUTO-ENTMCNC: 34.4 G/DL (ref 31.5–36.5)
MCV RBC AUTO: 93 FL (ref 78–100)
MONOCYTES # BLD AUTO: 0.6 10E9/L (ref 0–1.3)
MONOCYTES NFR BLD AUTO: 8.7 %
NEUTROPHILS # BLD AUTO: 3.9 10E9/L (ref 1.6–8.3)
NEUTROPHILS NFR BLD AUTO: 56.8 %
PLATELET # BLD AUTO: 290 10E9/L (ref 150–450)
RBC # BLD AUTO: 3.94 10E12/L (ref 3.8–5.2)
WBC # BLD AUTO: 6.8 10E9/L (ref 4–11)

## 2018-09-21 PROCEDURE — 85025 COMPLETE CBC W/AUTO DIFF WBC: CPT | Performed by: PHYSICIAN ASSISTANT

## 2018-09-21 PROCEDURE — 71046 X-RAY EXAM CHEST 2 VIEWS: CPT | Mod: FY

## 2018-09-21 PROCEDURE — 36415 COLL VENOUS BLD VENIPUNCTURE: CPT | Performed by: PHYSICIAN ASSISTANT

## 2018-09-21 PROCEDURE — 85379 FIBRIN DEGRADATION QUANT: CPT | Performed by: PHYSICIAN ASSISTANT

## 2018-09-21 PROCEDURE — 99214 OFFICE O/P EST MOD 30 MIN: CPT | Performed by: PHYSICIAN ASSISTANT

## 2018-09-21 RX ORDER — ALBUTEROL SULFATE 90 UG/1
2 AEROSOL, METERED RESPIRATORY (INHALATION) EVERY 4 HOURS PRN
Qty: 1 INHALER | Refills: 0 | Status: SHIPPED | OUTPATIENT
Start: 2018-09-21 | End: 2019-03-12

## 2018-09-21 RX ORDER — AZITHROMYCIN 250 MG/1
TABLET, FILM COATED ORAL
Qty: 6 TABLET | Refills: 0 | Status: SHIPPED | OUTPATIENT
Start: 2018-09-21 | End: 2018-10-04

## 2018-09-21 NOTE — MR AVS SNAPSHOT
After Visit Summary   9/21/2018    Roxane Lopes    MRN: [id number]           Patient Information     Date Of Birth          1975        Visit Information        Provider Department      9/21/2018 11:15 AM Nanette Card PA-C [facility]        Today's Diagnoses     Lower resp. tract infection    -  1    Wheezing        Cough        H/O deep venous thrombosis        H/O: pneumonia           Follow-ups after your visit        Your next 10 appointments already scheduled     Oct 04, 2018  1:10 PM CDT   LAB with LAB FIRST FLOOR [facility] ([facility])    [address] [place] [id number] [phone]           Please do not eat 10-12 hours before your appointment if you are coming in fasting for labs on lipids, cholesterol, or glucose (sugar). This does not apply to pregnant women. Water, hot tea and black coffee (with nothing added) are okay. Do not drink other fluids, diet soda or chew gum.            Oct 04, 2018  1:30 PM CDT   PHYSICAL with MEENAKSHI Ramos [facility] ([facility])    [address] [place] [id number] [phone]              Who to contact     If you have questions or need follow up information about today's clinic visit or your schedule please contact [facility] directly at [phone].  Normal or non-critical lab and imaging results will be communicated to you by MyChart, letter or phone within 4 business days after the clinic has received the results. If you do not hear from us within 7 days, please contact the clinic through MyChart or phone. If you have a critical or abnormal lab result, we will notify you by phone as soon as possible.  Submit refill requests through [employer] or call your pharmacy and they will forward the refill request to us. Please allow 3 business days for your refill to be completed.           Additional Information About Your Visit        [employer] Information     [employer] gives you secure access to your electronic health record. If you see a primary care provider, you can also send messages to your care team and make appointments. If you have questions, please call your primary care clinic.  If you do not have a primary care provider, please call [phone] and they will assist you.        Care EveryWhere ID     This is your Care EveryWhere ID. This could be used by other organizations to access your [facility] medical records  [id number]        Your Vitals Were     Pulse Temperature Respirations Last Period Pulse Oximetry BMI (Body Mass Index)    88 98.4  F (36.9  C) (Oral) 20 08/03/2018 98% 32.92 kg/m2       Blood Pressure from Last 3 Encounters:   09/21/18 127/85   07/18/18 120/80   05/21/18 120/82    Weight from Last 3 Encounters:   09/21/18 210 lb 3.2 oz (95.3 kg)   07/18/18 205 lb (93 kg)   05/21/18 203 lb 8 oz (92.3 kg)              We Performed the Following     CBC with platelets differential     D dimer, quantitative          Today's Medication Changes          These changes are accurate as of 9/21/18  2:05 PM.  If you have any questions, ask your nurse or doctor.               Start taking these medicines.        Dose/Directions    albuterol 108 (90 Base) MCG/ACT inhaler   Commonly known as:  PROAIR HFA/PROVENTIL HFA/VENTOLIN HFA   Used for:  Cough   Started by:  Nanette Card PA-C        Dose:  2 puff   Inhale 2 puffs into the lungs every 4 hours as needed for shortness of breath / dyspnea or wheezing   Quantity:  1 Inhaler   Refills:  0       azithromycin 250 MG tablet   Commonly known as:  ZITHROMAX Z-SHIMA   Used for:  Cough   Started by:  Nanette Card PA-C        2 tabs day one then 1 tab qd   Quantity:  6 tablet   Refills:  0            Where to get your medicines      These medications were sent to [facility]/pharmacy #[id number] - [facility], [place] - [address]  [address]  [facility] [address] [facility] [place] [address]     Phone:  [phone]     albuterol 108 (90 Base) MCG/ACT inhaler    azithromycin 250 MG tablet                Primary Care Provider Office Phone # Fax #    Miguelina Bledsoe MEENAKSHI [facility] [phone]       [address]  [place] [address]        Equal Access to Services     RADHAMES BOYKIN : Hadii aad ku hadasho Soomaali, waaxda luqadaha, qaybta kaalmada adeegyada, waxay idiin hayaan adeeg kharash la'aan ah. So [facility] [phone].    ATENCIÓN: Si habla espfe, tiene a purvis disposición servicios gratuitos de asistencia lingüística. Llame al [phone].    We comply with applicable federal civil rights laws and [place] laws. We do not discriminate on the basis of race, color, national origin, age, disability, sex, sexual orientation, or gender identity.            Thank you!     Thank you for choosing [facility]  for your care. Our goal is always to provide you with excellent care. Hearing back from our patients is one way we can continue to improve our services. Please take a few minutes to complete the written survey that you may receive in the mail after your visit with us. Thank you!             Your Updated Medication List - Protect others around you: Learn how to safely use, store and throw away your medicines at www.disposemymeds.org.          This list is accurate as of 9/21/18  2:05 PM.  Always use your most recent med list.                   Brand Name Dispense Instructions for use Diagnosis    acetaminophen-codeine 300-30 MG per tablet    TYLENOL #3    6 tablet    Take 1 tablet by mouth every 6 hours as needed for severe pain    Right acute serous otitis media, recurrence not specified, Right facial swelling       albuterol 108 (90 Base) MCG/ACT inhaler    PROAIR HFA/PROVENTIL HFA/VENTOLIN HFA    1 Inhaler    Inhale 2 puffs into the lungs every 4 hours as needed for shortness of breath / dyspnea or wheezing    Cough        amoxicillin 500 MG capsule    AMOXIL     TAKE 2 CAPSULES (1,000 MG) BY MOUTH TWICE A DAY FOR 10 DAYS.        azithromycin 250 MG tablet    ZITHROMAX Z-SHIMA    6 tablet    2 tabs day one then 1 tab qd    Cough       cyclobenzaprine 10 MG tablet    FLEXERIL    30 tablet    Take 1 tablet (10 mg) by mouth 2 times daily as needed for muscle spasms    Cramps of lower extremity       diclofenac 50 MG EC tablet    VOLTAREN    30 tablet    Take 1 tablet (50 mg) by mouth 3 times daily as needed for moderate pain    Right acute serous otitis media, recurrence not specified, Right facial swelling       ibuprofen 800 MG tablet    ADVIL/MOTRIN    30 tablet    Take 1 tablet (800 mg) by mouth every 8 hours as needed for moderate pain    Pain of left lower extremity       predniSONE 20 MG tablet    DELTASONE    10 tablet    Take 1 tablet (20 mg) by mouth 2 times daily    Right acute serous otitis media, recurrence not specified, Right facial swelling       venlafaxine 75 MG 24 hr capsule    EFFEXOR-XR    30 capsule    TAKE 1 CAPSULE (75 MG) BY MOUTH DAILY    Depression with anxiety

## 2018-09-21 NOTE — PROGRESS NOTES
S 44 yo female presents for cough and chest congestion for 1 week.  She states she felt like she had a fever 2 days ago.  She is a pharmacy technician at [employer].  She recently was diagnosed with prediabetes and is working on diet.  She took her blood sugar couple days ago and it was 120.  She has tried antihistamines, decongestants without significant relief.  She is also tried DayQuil and NyQuil without relief.  She is not on any hormone replacements or birth control pills.  She smokes 10 puffs of E cigarettes daily.      5 years ago she did have a left DVT.  At that time she smoked 5 cigarettes a day.  She was on Coumadin for 6 months.  Since then she has had chronic  leg pain in the area.  She does not know for sure if this chronic pain has been worse over the past week or not.  She has felt some shortness of breath with this cough.  The lower extremity DVT did not come on after traveling or an injury.  She noted the sudden onset of bruising and went in for evaluation of it.  She has not noticed any bruising of her leg over this past week or swelling.    No history of asthma or allergies.    Allergies   Allergen Reactions     Chantix [Varenicline Tartrate] Anxiety       Past Medical History:   Diagnosis Date     Depression      DVT (deep venous thrombosis) (H) 4/2011    left leg, was on lovenox and coumadin but have been off since November 2011     H/O tubal ligation     2006      Prediabetes          Current Outpatient Prescriptions on File Prior to Visit:  venlafaxine (EFFEXOR-XR) 75 MG 24 hr capsule TAKE 1 CAPSULE (75 MG) BY MOUTH DAILY   acetaminophen-codeine (TYLENOL #3) 300-30 MG per tablet Take 1 tablet by mouth every 6 hours as needed for severe pain (Patient not taking: Reported on 9/21/2018)   amoxicillin (AMOXIL) 500 MG capsule TAKE 2 CAPSULES (1,000 MG) BY MOUTH TWICE A DAY FOR 10 DAYS.   cyclobenzaprine (FLEXERIL) 10 MG tablet Take 1 tablet (10 mg) by mouth 2 times daily as needed for muscle spasms  (Patient not taking: Reported on 9/21/2018)   diclofenac (VOLTAREN) 50 MG EC tablet Take 1 tablet (50 mg) by mouth 3 times daily as needed for moderate pain (Patient not taking: Reported on 9/21/2018)   ibuprofen (ADVIL/MOTRIN) 800 MG tablet Take 1 tablet (800 mg) by mouth every 8 hours as needed for moderate pain   predniSONE (DELTASONE) 20 MG tablet Take 1 tablet (20 mg) by mouth 2 times daily (Patient not taking: Reported on 9/21/2018)     No current facility-administered medications on file prior to visit.     Social History   Substance Use Topics     Smoking status: Former Smoker     Packs/day: 0.50     Years: 10.00     Types: Cigarettes     Quit date: 10/16/2012     Smokeless tobacco: Never Used      Comment: on e cigs now trying to quit     Alcohol use Yes      Comment: Maybe once a year       ROS:  CONSTITUTIONAL: Negative for fatigue or fever.  EYES: Negative for eye problems.  ENT: As above.  RESP: As above.  CV: Negative for chest pains.  GI: Negative for vomiting.  MUSCULOSKELETAL:  Negative for significant muscle or joint pains.  NEUROLOGIC: Negative for headaches.  SKIN: Negative for rash.    OBJECTIVE:  /85  Pulse 88  Temp 98.4  F (36.9  C) (Oral)  Resp 20  Wt 210 lb 3.2 oz (95.3 kg)  LMP 08/03/2018  SpO2 98%  BMI 32.92 kg/m2  GENERAL APPEARANCE: Healthy, alert and no distress.  EYES:Conjunctiva/sclera clear.  EARS: No cerumen.   Ear canals w/o erythema.  TM's intact w/o erythema.    NOSE/MOUTH: Nose without ulcers, erythema or lesions.  SINUSES: No maxillary sinus tenderness.  THROAT: No erythema w/o tonsillar enlargement . No exudates.  NECK: Supple, nontender, no lymphadenopathy.  RESP: Lungs some expiratory wheezes both bases.    CV: Regular rate and rhythm, normal S1 S2, no murmur noted.  NEURO: Awake, alert    SKIN: No rashes  Calves nontender bilaterally.  No swelling.  Negative Homans.  Results for orders placed or performed in visit on 09/21/18   CBC with platelets differential    Result Value Ref Range    WBC 6.8 4.0 - 11.0 10e9/L    RBC Count 3.94 3.8 - 5.2 10e12/L    Hemoglobin 12.6 11.7 - 15.7 g/dL    Hematocrit 36.6 35.0 - 47.0 %    MCV 93 78 - 100 fl    MCH 32.0 26.5 - 33.0 pg    MCHC 34.4 31.5 - 36.5 g/dL    RDW 13.4 10.0 - 15.0 %    Platelet Count 290 150 - 450 10e9/L    % Neutrophils 56.8 %    % Lymphocytes 32.4 %    % Monocytes 8.7 %    % Eosinophils 1.8 %    % Basophils 0.3 %    Absolute Neutrophil 3.9 1.6 - 8.3 10e9/L    Absolute Lymphocytes 2.2 0.8 - 5.3 10e9/L    Absolute Monocytes 0.6 0.0 - 1.3 10e9/L    Absolute Eosinophils 0.1 0.0 - 0.7 10e9/L    Absolute Basophils 0.0 0.0 - 0.2 10e9/L    Diff Method Automated Method        Study Result   XR CHEST 2 VW 9/21/2018 12:16 PM     COMPARISON: 2/4/2014     HISTORY: Cough         IMPRESSION: Cardiac silhouette and pulmonary vasculature are within  normal limits. No focal airspace disease, pleural effusion or  pneumothorax.     FAWAD JONES MD     Results for orders placed or performed in visit on 09/21/18   D dimer, quantitative   Result Value Ref Range    D Dimer 0.3 0.0 - 0.50 ug/ml FEU   CBC with platelets differential   Result Value Ref Range    WBC 6.8 4.0 - 11.0 10e9/L    RBC Count 3.94 3.8 - 5.2 10e12/L    Hemoglobin 12.6 11.7 - 15.7 g/dL    Hematocrit 36.6 35.0 - 47.0 %    MCV 93 78 - 100 fl    MCH 32.0 26.5 - 33.0 pg    MCHC 34.4 31.5 - 36.5 g/dL    RDW 13.4 10.0 - 15.0 %    Platelet Count 290 150 - 450 10e9/L    % Neutrophils 56.8 %    % Lymphocytes 32.4 %    % Monocytes 8.7 %    % Eosinophils 1.8 %    % Basophils 0.3 %    Absolute Neutrophil 3.9 1.6 - 8.3 10e9/L    Absolute Lymphocytes 2.2 0.8 - 5.3 10e9/L    Absolute Monocytes 0.6 0.0 - 1.3 10e9/L    Absolute Eosinophils 0.1 0.0 - 0.7 10e9/L    Absolute Basophils 0.0 0.0 - 0.2 10e9/L    Diff Method Automated Method          ASSESSMENT:     ICD-10-CM    1. Lower resp. tract infection J22    2. Wheezing R06.2    3. Cough R05 XR Chest 2 Views     D dimer, quantitative      CBC with platelets differential     azithromycin (ZITHROMAX Z-SHIMA) 250 MG tablet     albuterol (PROAIR HFA/PROVENTIL HFA/VENTOLIN HFA) 108 (90 Base) MCG/ACT inhaler   4. H/O deep venous thrombosis Z86.718    5. H/O: pneumonia Z87.01        PLAN:Keep cell phone on her at all times. Will call with D dimer and Rad results. Called with results. Start antibiotic and inhaler. F/U PCP next week if not better. To ER if increased SHORTNESS OF BREATH, chest pain, leg pain or swelling  Lots of rest and fluids.  RTC if any worsening symptoms or if not improving.    Nanette Card PA-C

## 2018-09-26 ENCOUNTER — TELEPHONE (OUTPATIENT)
Dept: URGENT CARE | Facility: URGENT CARE | Age: 43
End: 2018-09-26

## 2018-09-26 NOTE — TELEPHONE ENCOUNTER
Patient isn't any better since her visit with you    Is asking for prednisone to help with cough    And the muscle relaxer you talked about at her visit    Thank you    Use DEX [phone]

## 2018-09-26 NOTE — TELEPHONE ENCOUNTER
Per UC note on 9/21/18:    PLAN:Keep cell phone on her at all times. Will call with D dimer and Rad results. Called with results. Start antibiotic and inhaler. F/U PCP next week if not better. To ER if increased SHORTNESS OF BREATH, chest pain, leg pain or swelling  Lots of rest and fluids.  RTC if any worsening symptoms or if not improving.     Nanette Card PA-C      Please notify patient she needs to follow up with her PCP if not feeling better.     Dl Patrick RN, BSN

## 2018-09-26 NOTE — TELEPHONE ENCOUNTER
This writer attempted to contact Roxane on 09/26/18      Reason for call triage/schedule appointment with provider if not feeling better and left message.      If patient calls back:   Patient contacted by a Registered Nurse. Inform patient that someone from the RN group will contact them, document that pt called and route to P DYAD 3 RN POOL [[id number]]        Pretty Mccauley, RN

## 2018-09-27 NOTE — TELEPHONE ENCOUNTER
..Reason for Call:  call back    Detailed comments: Patient called back and will be waiting on your call, please leave a detailed voice mail.    Phone Number Patient can be reached at: Home number on file [phone] (home)    Best Time: anytime    Can we leave a detailed message on this number? YES    Call taken on 9/27/2018 at 11:52 AM by Josh Gray

## 2018-09-27 NOTE — TELEPHONE ENCOUNTER
This writer attempted to contact Roxane on 09/27/18      Reason for call abnormal results/provider advisement and left message.      If patient calls back:   Patient contacted by a Registered Nurse. Inform patient that someone from the RN group will contact them, document that pt called and route to P DYAD 3 RN POOL [[id number]]        Xiao Robbins RN

## 2018-09-27 NOTE — TELEPHONE ENCOUNTER
This writer attempted to contact Roxane on 09/27/18      Reason for call triage and left message.    This writer called and left a detailed message on patient's voicemail with the following information from the provider:     With prior history of DVT and cough not improving she needs follow up with her Primary or go to the ER. Her D dimer test for clot was normal or negative but this does not completely rule out a clot or pulmonary embolism. Please notify.     Nanette Card PA-C    If patient calls back:   Patient contacted by a Registered Nurse. Inform patient that someone from the RN group will contact them, document that pt called and route to P DYAD 3 RN POOL [[id number]]        Pretty Mccauley RN

## 2018-09-27 NOTE — TELEPHONE ENCOUNTER
With prior history of DVT and cough not improving she needs follow up with her Primary or go to the ER. Her D dimer test for clot was normal or negative but this does not completely rule out a clot or pulmonary embolism. Please notify.    Nanette Card PA-C

## 2018-10-02 ENCOUNTER — DOCUMENTATION ONLY (OUTPATIENT)
Dept: LAB | Facility: CLINIC | Age: 43
End: 2018-10-02

## 2018-10-02 DIAGNOSIS — Z13.1 SCREENING FOR DIABETES MELLITUS: ICD-10-CM

## 2018-10-02 DIAGNOSIS — Z13.6 CARDIOVASCULAR SCREENING; LDL GOAL LESS THAN 160: Primary | ICD-10-CM

## 2018-10-02 DIAGNOSIS — Z00.00 ENCOUNTER FOR ROUTINE ADULT HEALTH EXAMINATION WITHOUT ABNORMAL FINDINGS: ICD-10-CM

## 2018-10-02 NOTE — PROGRESS NOTES
Patient coming in for fasting labs and physical.  Routing pended labs to provider to please review and revise.    Yola Andrews RN, [facility]

## 2018-10-04 ENCOUNTER — OFFICE VISIT (OUTPATIENT)
Dept: PEDIATRICS | Facility: CLINIC | Age: 43
End: 2018-10-04
Payer: COMMERCIAL

## 2018-10-04 ENCOUNTER — TELEPHONE (OUTPATIENT)
Dept: OTHER | Facility: CLINIC | Age: 43
End: 2018-10-04

## 2018-10-04 VITALS
WEIGHT: 207.1 LBS | SYSTOLIC BLOOD PRESSURE: 120 MMHG | DIASTOLIC BLOOD PRESSURE: 80 MMHG | BODY MASS INDEX: 32.44 KG/M2 | OXYGEN SATURATION: 97 % | HEART RATE: 84 BPM | TEMPERATURE: 97.3 F

## 2018-10-04 DIAGNOSIS — I83.813 VARICOSE VEINS OF BOTH LOWER EXTREMITIES WITH PAIN: ICD-10-CM

## 2018-10-04 DIAGNOSIS — E55.9 VITAMIN D INSUFFICIENCY: ICD-10-CM

## 2018-10-04 DIAGNOSIS — Z13.29 SCREENING FOR THYROID DISORDER: ICD-10-CM

## 2018-10-04 DIAGNOSIS — G47.19 EXCESSIVE DAYTIME SLEEPINESS: ICD-10-CM

## 2018-10-04 DIAGNOSIS — F41.8 DEPRESSION WITH ANXIETY: ICD-10-CM

## 2018-10-04 DIAGNOSIS — J98.01 BRONCHOSPASM, ACUTE: ICD-10-CM

## 2018-10-04 DIAGNOSIS — Z13.1 SCREENING FOR DIABETES MELLITUS: ICD-10-CM

## 2018-10-04 DIAGNOSIS — M79.605 PAIN OF LEFT LOWER EXTREMITY: ICD-10-CM

## 2018-10-04 DIAGNOSIS — Z00.00 ENCOUNTER FOR ROUTINE ADULT HEALTH EXAMINATION WITHOUT ABNORMAL FINDINGS: ICD-10-CM

## 2018-10-04 DIAGNOSIS — Z12.31 ENCOUNTER FOR SCREENING MAMMOGRAM FOR BREAST CANCER: ICD-10-CM

## 2018-10-04 DIAGNOSIS — Z13.0 SCREENING FOR DISORDER OF BLOOD AND BLOOD-FORMING ORGANS: ICD-10-CM

## 2018-10-04 DIAGNOSIS — Z12.4 SCREENING FOR MALIGNANT NEOPLASM OF CERVIX: ICD-10-CM

## 2018-10-04 DIAGNOSIS — Z13.6 CARDIOVASCULAR SCREENING; LDL GOAL LESS THAN 160: ICD-10-CM

## 2018-10-04 DIAGNOSIS — Z00.00 ENCOUNTER FOR ROUTINE ADULT HEALTH EXAMINATION WITHOUT ABNORMAL FINDINGS: Primary | ICD-10-CM

## 2018-10-04 DIAGNOSIS — K21.9 GASTROESOPHAGEAL REFLUX DISEASE WITHOUT ESOPHAGITIS: ICD-10-CM

## 2018-10-04 LAB
ANION GAP SERPL CALCULATED.3IONS-SCNC: 7 MMOL/L (ref 3–14)
BUN SERPL-MCNC: 10 MG/DL (ref 7–30)
CALCIUM SERPL-MCNC: 8.7 MG/DL (ref 8.5–10.1)
CHLORIDE SERPL-SCNC: 109 MMOL/L (ref 94–109)
CHOLEST SERPL-MCNC: 202 MG/DL
CO2 SERPL-SCNC: 25 MMOL/L (ref 20–32)
CREAT SERPL-MCNC: 0.65 MG/DL (ref 0.52–1.04)
ERYTHROCYTE [DISTWIDTH] IN BLOOD BY AUTOMATED COUNT: 13.5 % (ref 10–15)
FERRITIN SERPL-MCNC: 43 NG/ML (ref 12–150)
GFR SERPL CREATININE-BSD FRML MDRD: >90 ML/MIN/1.7M2
GLUCOSE SERPL-MCNC: 88 MG/DL (ref 70–99)
HCT VFR BLD AUTO: 37.9 % (ref 35–47)
HDLC SERPL-MCNC: 69 MG/DL
HGB BLD-MCNC: 12.7 G/DL (ref 11.7–15.7)
LDLC SERPL CALC-MCNC: 111 MG/DL
MCH RBC QN AUTO: 31.4 PG (ref 26.5–33)
MCHC RBC AUTO-ENTMCNC: 33.5 G/DL (ref 31.5–36.5)
MCV RBC AUTO: 94 FL (ref 78–100)
NONHDLC SERPL-MCNC: 133 MG/DL
PLATELET # BLD AUTO: 342 10E9/L (ref 150–450)
POTASSIUM SERPL-SCNC: 4.1 MMOL/L (ref 3.4–5.3)
RBC # BLD AUTO: 4.05 10E12/L (ref 3.8–5.2)
SODIUM SERPL-SCNC: 141 MMOL/L (ref 133–144)
TRIGL SERPL-MCNC: 109 MG/DL
TSH SERPL DL<=0.005 MIU/L-ACNC: 1.34 MU/L (ref 0.4–4)
WBC # BLD AUTO: 7.3 10E9/L (ref 4–11)

## 2018-10-04 PROCEDURE — 99213 OFFICE O/P EST LOW 20 MIN: CPT | Mod: 25 | Performed by: NURSE PRACTITIONER

## 2018-10-04 PROCEDURE — 85027 COMPLETE CBC AUTOMATED: CPT | Performed by: NURSE PRACTITIONER

## 2018-10-04 PROCEDURE — 82728 ASSAY OF FERRITIN: CPT | Performed by: NURSE PRACTITIONER

## 2018-10-04 PROCEDURE — 80048 BASIC METABOLIC PNL TOTAL CA: CPT | Performed by: NURSE PRACTITIONER

## 2018-10-04 PROCEDURE — 99396 PREV VISIT EST AGE 40-64: CPT | Performed by: NURSE PRACTITIONER

## 2018-10-04 PROCEDURE — 80061 LIPID PANEL: CPT | Performed by: NURSE PRACTITIONER

## 2018-10-04 PROCEDURE — 84443 ASSAY THYROID STIM HORMONE: CPT | Performed by: NURSE PRACTITIONER

## 2018-10-04 PROCEDURE — 36415 COLL VENOUS BLD VENIPUNCTURE: CPT | Performed by: NURSE PRACTITIONER

## 2018-10-04 PROCEDURE — 82306 VITAMIN D 25 HYDROXY: CPT | Performed by: NURSE PRACTITIONER

## 2018-10-04 RX ORDER — VENLAFAXINE HYDROCHLORIDE 75 MG/1
CAPSULE, EXTENDED RELEASE ORAL
Qty: 30 CAPSULE | Refills: 0 | Status: CANCELLED | OUTPATIENT
Start: 2018-10-04

## 2018-10-04 RX ORDER — ALBUTEROL SULFATE 90 UG/1
2 AEROSOL, METERED RESPIRATORY (INHALATION) EVERY 4 HOURS PRN
Qty: 1 INHALER | Refills: 1 | Status: SHIPPED | OUTPATIENT
Start: 2018-10-04 | End: 2019-03-12

## 2018-10-04 RX ORDER — VENLAFAXINE HYDROCHLORIDE 150 MG/1
CAPSULE, EXTENDED RELEASE ORAL
Qty: 90 CAPSULE | Refills: 3 | Status: SHIPPED | OUTPATIENT
Start: 2018-10-04 | End: 2018-10-04

## 2018-10-04 RX ORDER — VENLAFAXINE HYDROCHLORIDE 150 MG/1
150 CAPSULE, EXTENDED RELEASE ORAL DAILY
Qty: 90 CAPSULE | Refills: 3 | Status: SHIPPED | OUTPATIENT
Start: 2018-10-04 | End: 2019-11-06

## 2018-10-04 RX ORDER — CYCLOBENZAPRINE HCL 10 MG
5-10 TABLET ORAL 3 TIMES DAILY PRN
Qty: 30 TABLET | Refills: 1 | Status: SHIPPED | OUTPATIENT
Start: 2018-10-04 | End: 2018-11-20

## 2018-10-04 ASSESSMENT — ANXIETY QUESTIONNAIRES
6. BECOMING EASILY ANNOYED OR IRRITABLE: NEARLY EVERY DAY
1. FEELING NERVOUS, ANXIOUS, OR ON EDGE: NEARLY EVERY DAY
7. FEELING AFRAID AS IF SOMETHING AWFUL MIGHT HAPPEN: SEVERAL DAYS
GAD7 TOTAL SCORE: 15
IF YOU CHECKED OFF ANY PROBLEMS ON THIS QUESTIONNAIRE, HOW DIFFICULT HAVE THESE PROBLEMS MADE IT FOR YOU TO DO YOUR WORK, TAKE CARE OF THINGS AT HOME, OR GET ALONG WITH OTHER PEOPLE: SOMEWHAT DIFFICULT
5. BEING SO RESTLESS THAT IT IS HARD TO SIT STILL: NEARLY EVERY DAY
2. NOT BEING ABLE TO STOP OR CONTROL WORRYING: SEVERAL DAYS
3. WORRYING TOO MUCH ABOUT DIFFERENT THINGS: SEVERAL DAYS

## 2018-10-04 ASSESSMENT — PATIENT HEALTH QUESTIONNAIRE - PHQ9: 5. POOR APPETITE OR OVEREATING: NEARLY EVERY DAY

## 2018-10-04 NOTE — NURSING NOTE
"Chief Complaint   Patient presents with     Physical     AFE       Initial /80 (BP Location: Right arm, Patient Position: Sitting, Cuff Size: Adult Regular)  Pulse 84  Temp 97.3  F (36.3  C) (Temporal)  Wt 207 lb 1.6 oz (93.9 kg)  LMP 10/03/2018  SpO2 97%  Breastfeeding? No  BMI 32.44 kg/m2 Estimated body mass index is 32.44 kg/(m^2) as calculated from the following:    Height as of 7/18/18: 5' 7\" (1.702 m).    Weight as of this encounter: 207 lb 1.6 oz (93.9 kg).  Medication Reconciliation: complete      DANIS Silverio      "

## 2018-10-04 NOTE — MR AVS SNAPSHOT
After Visit Summary   10/4/2018    Roxane Lopes    MRN: [id number]           Patient Information     Date Of Birth          1975        Visit Information        Provider Department      10/4/2018 1:30 PM Miguelina Bledsoe APRN CNP [facility]        Today's Diagnoses     Encounter for routine adult health examination without abnormal findings    -  1    Depression with anxiety        Gastroesophageal reflux disease without esophagitis        Pain of left lower extremity        Varicose veins of both lower extremities with pain        Screening for disorder of blood and blood-forming organs        Screening for thyroid disorder        Bronchospasm, acute        Encounter for screening mammogram for breast cancer        Screening for malignant neoplasm of cervix        Excessive daytime sleepiness          Care Instructions    PLAN:   1.   Symptomatic therapy suggested: Increase effexor to 150 mg a day  Will try prilosec for reflux  Will add on Albuterol every 4 hours for the next 48 hours, then as needed for cough    2.  Orders Placed This Encounter   Medications     cyclobenzaprine (FLEXERIL) 10 MG tablet     Sig: Take 0.5-1 tablets (5-10 mg) by mouth 3 times daily as needed for muscle spasms     Dispense:  30 tablet     Refill:  1     omeprazole (PRILOSEC) 20 MG CR capsule     Sig: Take 1 capsule (20 mg) by mouth daily     Dispense:  30 capsule     Refill:  1     albuterol (PROAIR HFA/PROVENTIL HFA/VENTOLIN HFA) 108 (90 Base) MCG/ACT inhaler     Sig: Inhale 2 puffs into the lungs every 4 hours as needed for shortness of breath / dyspnea or wheezing     Dispense:  1 Inhaler     Refill:  1     DISCONTD: venlafaxine (EFFEXOR-XR) 150 MG 24 hr capsule     Sig: TAKE 1 CAPSULE (75 MG) BY MOUTH DAILY     Dispense:  90 capsule     Refill:  3     order for DME     Sig: Compression  stocking to legs bilaterally. 10-20 mm of pressure.     Dispense:  2 each     Refill:  1     venlafaxine  (EFFEXOR-XR) 150 MG 24 hr capsule     Sig: Take 1 capsule (150 mg) by mouth daily     Dispense:  90 capsule     Refill:  3     Orders Placed This Encounter   Procedures     MA Screening Digital Bilateral     Ferritin     CBC with platelets     TSH with free T4 reflex     Vitamin D Deficiency     SLEEP EVALUATION & MANAGEMENT REFERRAL - ADULT -[facility] - [place]  [phone] (Age 15 and up)     VASCULAR SURGERY REFERRAL     MENTAL HEALTH REFERRAL  - Adult; Outpatient Treatment; Individual/Couples/Family/Group Therapy/Health Psychology; [facility]: [facility] [phone]; We will contact you to schedule the appointment or please call with any questions       3. Patient needs to follow up in if no improvement,or sooner if worsening of symptoms or other symptoms develop.  CONSULTATION/REFERRAL to vascular specialist and for sleep study and counseling   Will follow up and/or notify patient of  results via My Chart to determine further need for followup  Come back for pap only   Follow up office visit in one year for annual health maintenance exam, sooner PRN.    Preventive Health Recommendations  Female Ages 40 to 49    Yearly exam:     See your health care provider every year in order to  1. Review health changes.   2. Discuss preventive care.    3. Review your medicines if your doctor prescribed any.      Get a Pap test every three years (unless you have an abnormal result and your provider advises testing more often).      If you get Pap tests with HPV test, you only need to test every 5 years, unless you have an abnormal result. You do not need a Pap test if your uterus was removed (hysterectomy) and you have not had cancer.      You should be tested each year for STDs (sexually transmitted diseases), if you're at risk.     Ask your doctor if you should have a mammogram.      Have a colonoscopy (test for colon cancer) if someone in your family has had colon cancer or polyps before  age 50.       Have a cholesterol test every 5 years.       Have a diabetes test (fasting glucose) after age 45. If you are at risk for diabetes, you should have this test every 3 years.    Shots: Get a flu shot each year. Get a tetanus shot every 10 years.     Nutrition:     Eat at least 5 servings of fruits and vegetables each day.    Eat whole-grain bread, whole-wheat pasta and brown rice instead of white grains and rice.    Get adequate Calcium and Vitamin D.      Lifestyle    Exercise at least 150 minutes a week (an average of 30 minutes a day, 5 days a week). This will help you control your weight and prevent disease.    Limit alcohol to one drink per day.    No smoking.     Wear sunscreen to prevent skin cancer.    See your dentist every six months for an exam and cleaning.  It was a pleasure seeing you today at the [facility]. Thank you for allowing us to care for you today. We truly hope we provided you with the excellent service you deserve. Please let us know if there is anything else we can do for you so we can be sure you are leaving completley satisfied with your care experience.       General information about your clinic   Clinic Hours Lab Hours (Appointments are required)   Mon-Thurs: 7:30 AM - 7 PM Mon-Thurs: 7:30 AM - 7 PM   Fri: 7:30 AM - 5 PM Fri: 7:30 AM - 5 PM        After Hours Nurse Advise & Appts:  [facility] Advisors: [phone]  [facility] On Call: to make appointments anytime: [phone] On Call Physician: call [phone] and answering service will page the on call physician.        For urgent appointments, please call [phone] and ask for the triage nurse or your care team clinic nurse.  How to contact my care team:  MyChart: [email or web address]   Phone: [phone]   Fax: [phone]       [facility]:   Phone: [phone]  Hours: 8:00 AM - 6:00 PM  Medication Refills:  Call your pharmacy and they will forward the refill to us.  Please allow 3 business days for your refills to be completed.       Normal or non-critical lab and imaging results will be communicated to you by MyChart, letter or phone within 7 days.  If you do not hear from us within 10 days, please call the clinic. If you have a critical or abnormal lab result, we will notify you by phone as soon as possible.       We now have PWIC (Pediatric Walk in Care)  Monday-Friday from 7:30-4. Simply walk in and be seen for your urgent needs like cough, fever, rash, diarrhea or vomiting, pink eye, UTI. No appointments needed. Ask one of the team for more information      -Your Care Team:    Dr. Taylor Garcia - Internal Medicine/Pediatrics   Dr. Sindhu Arteaga - Family Medicine  Dr. Ashanti Javier - Pediatrics  Dr. Kylie Marquez - Pediatrics  Miguelina Bledsoe CNP - Family Practice Nurse Practitioner            Follow-ups after your visit        Additional Services     MENTAL HEALTH REFERRAL  - Adult; Outpatient Treatment; Individual/Couples/Family/Group Therapy/Health Psychology; [facility]: [facility] [phone]; We will contact you to schedule the appointment or please call with any questions       All scheduling is subject to the client's specific insurance plan & benefits, provider/location availability, and provider clinical specialities.  Please arrive 15 minutes early for your first appointment and bring your completed paperwork.    Please be aware that coverage of these services is subject to the terms and limitations of your health insurance plan.  Call member services at your health plan with any benefit or coverage questions.                      SLEEP EVALUATION & MANAGEMENT REFERRAL - ADULT -[facility] - [place]  [phone] (Age 15 and up)       Please be aware that coverage of these services is subject to the terms and limitations of your health insurance plan.  Call member services at your health plan with any benefit or coverage questions.       Please bring the following to your appointment:    >>   List of current medications   >>   This referral request   >>   Any documents/labs given to you for this referral                VASCULAR SURGERY REFERRAL       Your provider has referred you to: [facility]: [facility] - [phone] or [facility]  - Varicose Veins & None - Please Order Appropriate Testing   [email or web address]    Please be aware that coverage of these services is subject to the terms and limitations of your health insurance plan.  Call member services at your health plan with any benefit or coverage questions.      Please bring the following with you to your appointment:    (1) Any X-Rays, CTs or MRIs which have been performed.  Contact the facility where they were done to arrange for  prior to your scheduled appointment.    (2) List of current medications   (3) This referral request   (4) Any documents/labs given to you for this referral                  Future tests that were ordered for you today     Open Future Orders        Priority Expected Expires Ordered    SLEEP EVALUATION & MANAGEMENT REFERRAL - ADULT -[facility] - [place]  [phone] (Age 15 and up) Routine  10/4/2019 10/4/2018    MA Screening Digital Bilateral Routine  10/4/2019 10/4/2018            Who to contact     If you have questions or need follow up information about today's clinic visit or your schedule please contact [facility] directly at [phone].  Normal or non-critical lab and imaging results will be communicated to you by MyChart, letter or phone within 4 business days after the clinic has received the results. If you do not hear from us within 7 days, please contact the clinic through MyChart or phone. If you have a critical or abnormal lab result, we will notify you by phone as soon as possible.  Submit refill requests through [employer] or call your  pharmacy and they will forward the refill request to us. Please allow 3 business days for your refill to be completed.          Additional Information About Your Visit        [employer] Information     [employer] gives you secure access to your electronic health record. If you see a primary care provider, you can also send messages to your care team and make appointments. If you have questions, please call your primary care clinic.  If you do not have a primary care provider, please call [phone] and they will assist you.      [employer] is an electronic gateway that provides easy, online access to your medical records. With [employer], you can request a clinic appointment, read your test results, renew a prescription or communicate with your care team.     To access your existing account, please contact your [facility] or call [phone] for assistance.        Care EveryWhere ID     This is your Care EveryWhere ID. This could be used by other organizations to access your [facility] medical records  [id number]        Your Vitals Were     Pulse Temperature Last Period Pulse Oximetry Breastfeeding? BMI (Body Mass Index)    84 97.3  F (36.3  C) (Temporal) 10/03/2018 97% No 32.44 kg/m2       Blood Pressure from Last 3 Encounters:   10/04/18 120/80   09/21/18 127/85   07/18/18 120/80    Weight from Last 3 Encounters:   10/04/18 207 lb 1.6 oz (93.9 kg)   09/21/18 210 lb 3.2 oz (95.3 kg)   07/18/18 205 lb (93 kg)              We Performed the Following     CBC with platelets     Ferritin     MENTAL HEALTH REFERRAL  - Adult; Outpatient Treatment; Individual/Couples/Family/Group Therapy/Health Psychology; [facility]: [facility] [phone]; We will contact you to schedule the appointment or please call with any questions     TSH with free T4 reflex     VASCULAR SURGERY REFERRAL     Vitamin D Deficiency          Today's Medication Changes          These changes are accurate as of 10/4/18   2:34 PM.  If you have any questions, ask your nurse or doctor.               Start taking these medicines.        Dose/Directions    cyclobenzaprine 10 MG tablet   Commonly known as:  FLEXERIL   Used for:  Pain of left lower extremity   Started by:  Miguelina Bledsoe APRN CNP        Dose:  5-10 mg   Take 0.5-1 tablets (5-10 mg) by mouth 3 times daily as needed for muscle spasms   Quantity:  30 tablet   Refills:  1       omeprazole 20 MG CR capsule   Commonly known as:  priLOSEC   Used for:  Gastroesophageal reflux disease without esophagitis   Started by:  Miguelina Bledsoe APRN CNP        Dose:  20 mg   Take 1 capsule (20 mg) by mouth daily   Quantity:  30 capsule   Refills:  1       order for DME   Used for:  Varicose veins of both lower extremities with pain   Started by:  Miguelina Bledsoe APRN CNP        Compression  stocking to legs bilaterally. 10-20 mm of pressure.   Quantity:  2 each   Refills:  1       venlafaxine 150 MG 24 hr capsule   Commonly known as:  EFFEXOR-XR   Used for:  Depression with anxiety   Started by:  Miguelina Bledsoe APRN CNP        Dose:  150 mg   Take 1 capsule (150 mg) by mouth daily   Quantity:  90 capsule   Refills:  3         These medicines have changed or have updated prescriptions.        Dose/Directions    * albuterol 108 (90 Base) MCG/ACT inhaler   Commonly known as:  PROAIR HFA/PROVENTIL HFA/VENTOLIN HFA   This may have changed:  Another medication with the same name was added. Make sure you understand how and when to take each.   Used for:  Cough   Changed by:  Miguelina Bledsoe APRN CNP        Dose:  2 puff   Inhale 2 puffs into the lungs every 4 hours as needed for shortness of breath / dyspnea or wheezing   Quantity:  1 Inhaler   Refills:  0       * albuterol 108 (90 Base) MCG/ACT inhaler   Commonly known as:  PROAIR HFA/PROVENTIL HFA/VENTOLIN HFA   This may have changed:  You were already taking a medication with the same name, and this  prescription was added. Make sure you understand how and when to take each.   Used for:  Bronchospasm, acute   Changed by:  Miguelina Bledsoe APRN CNP        Dose:  2 puff   Inhale 2 puffs into the lungs every 4 hours as needed for shortness of breath / dyspnea or wheezing   Quantity:  1 Inhaler   Refills:  1       * Notice:  This list has 2 medication(s) that are the same as other medications prescribed for you. Read the directions carefully, and ask your doctor or other care provider to review them with you.         Where to get your medicines      These medications were sent to [facility]/pharmacy #[id number] - [facility], [place] - [address]  [address] [facility] [address]     Phone:  [phone]     albuterol 108 (90 Base) MCG/ACT inhaler    cyclobenzaprine 10 MG tablet    omeprazole 20 MG CR capsule    venlafaxine 150 MG 24 hr capsule         Some of these will need a paper prescription and others can be bought over the counter.  Ask your nurse if you have questions.     Bring a paper prescription for each of these medications     order for DME                Primary Care Provider Office Phone # Fax #    MEENAKSHI Ramos [phone]       [address]  [place] [address]        Equal Access to Services     RADHAMES BOYKIN AH: Hadii marcy ku hadasho Soomaali, waaxda luqadaha, qaybta kaalmada adeegyada, waxay idiin haymiriam arriaza. So [facility] [phone].    ATENCIÓN: Si habla español, tiene a purvis disposición servicios gratuitos de asistencia lingüística. Llame al [phone].    We comply with applicable federal civil rights laws and [place] laws. We do not discriminate on the basis of race, color, national origin, age, disability, sex, sexual orientation, or gender identity.            Thank you!     Thank you for choosing [facility]  for your care. Our goal is always to provide you with excellent care. Hearing back from our  patients is one way we can continue to improve our services. Please take a few minutes to complete the written survey that you may receive in the mail after your visit with us. Thank you!             Your Updated Medication List - Protect others around you: Learn how to safely use, store and throw away your medicines at www.disposemymeds.org.          This list is accurate as of 10/4/18  2:34 PM.  Always use your most recent med list.                   Brand Name Dispense Instructions for use Diagnosis    acetaminophen-codeine 300-30 MG per tablet    TYLENOL #3    6 tablet    Take 1 tablet by mouth every 6 hours as needed for severe pain    Right acute serous otitis media, recurrence not specified, Right facial swelling       * albuterol 108 (90 Base) MCG/ACT inhaler    PROAIR HFA/PROVENTIL HFA/VENTOLIN HFA    1 Inhaler    Inhale 2 puffs into the lungs every 4 hours as needed for shortness of breath / dyspnea or wheezing    Cough       * albuterol 108 (90 Base) MCG/ACT inhaler    PROAIR HFA/PROVENTIL HFA/VENTOLIN HFA    1 Inhaler    Inhale 2 puffs into the lungs every 4 hours as needed for shortness of breath / dyspnea or wheezing    Bronchospasm, acute       cyclobenzaprine 10 MG tablet    FLEXERIL    30 tablet    Take 0.5-1 tablets (5-10 mg) by mouth 3 times daily as needed for muscle spasms    Pain of left lower extremity       ibuprofen 800 MG tablet    ADVIL/MOTRIN    30 tablet    Take 1 tablet (800 mg) by mouth every 8 hours as needed for moderate pain    Pain of left lower extremity       omeprazole 20 MG CR capsule    priLOSEC    30 capsule    Take 1 capsule (20 mg) by mouth daily    Gastroesophageal reflux disease without esophagitis       order for DME     2 each    Compression  stocking to legs bilaterally. 10-20 mm of pressure.    Varicose veins of both lower extremities with pain       venlafaxine 150 MG 24 hr capsule    EFFEXOR-XR    90 capsule    Take 1 capsule (150 mg) by mouth daily    Depression  with anxiety       * Notice:  This list has 2 medication(s) that are the same as other medications prescribed for you. Read the directions carefully, and ask your doctor or other care provider to review them with you.

## 2018-10-04 NOTE — PROGRESS NOTES
"     SUBJECTIVE:   CC: Roxane Lopes is an 43 year old woman who presents for preventive health visit.     Healthy Habits:    Do you get at least three servings of calcium containing foods daily (dairy, green leafy vegetables, etc.)? no, taking calcium and/or vitamin D supplement: no    Amount of exercise or daily activities, outside of work: very little    Problems taking medications regularly No    Medication side effects: No    Have you had an eye exam in the past two years? no    Do you see a dentist twice per year? yes    Do you have sleep apnea, excessive snoring or daytime drowsiness?no      Depression and Anxiety Follow-Up    Status since last visit: No change    Other associated symptoms:feeling anxious     Complicating factors:     Significant life event: Yes-  Switching to a new pharmacy location     Current substance abuse: None    PHQ 9/1/2016 7/18/2018 10/4/2018   PHQ-9 Total Score 7 18 11   Q9: Suicide Ideation Not at all Not at all Not at all     OLLIE-7 SCORE 9/1/2016 7/18/2018 10/4/2018   Total Score - - -   Total Score 0 10 15     In the past two weeks have you had thoughts of suicide or self-harm?  No.    Do you have concerns about your personal safety or the safety of others?   No  PHQ-9  English  PHQ-9   Any Language  OLLIE-7  Suicide Assessment Five-step Evaluation and Treatment (SAFE-T)    Today's PHQ-2 Score:   PHQ-2 ( 1999 [employer]) 10/4/2018 7/18/2018   Q1: Little interest or pleasure in doing things 0 2   Q2: Feeling down, depressed or hopeless 0 2   PHQ-2 Score 0 4      Leg pain:  Had DVT 6 years ago, was on Coumadin for 3 months. Since then she has had intermittent LLE pain, \"like someone is pulling my leg\". Had the same pain about 3 months ago and was given Flexeril and that worked well--wondering if she can have more. Denies numbness and tingling. She does not want to take Ibuprofen or Narcotics.   She does work sitting at a desk, but keeps her legs elevated.    Follow up:  URI s/p 3 weeks " "ago--Finished her course of abx, is slowly getting better, but still feels like she has coughing fits. Denies shortness of breath and chest pain.     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     Having Reflux again. Is worse at night, \"I get this acid taste when I lay down\". Has not tried any OTC's to help with this.     Abuse: Current or Past(Physical, Sexual or Emotional)- No  Do you feel safe in your environment - Yes    Social History   Substance Use Topics     Smoking status: Former Smoker     Packs/day: 0.50     Years: 10.00     Types: Cigarettes     Quit date: 10/16/2012     Smokeless tobacco: Never Used      Comment: on e cigs now trying to quit     Alcohol use Yes      Comment: Maybe once a year     If you drink alcohol do you typically have >3 drinks per day or >7 drinks per week? No                     Reviewed orders with patient.  Reviewed health maintenance and updated orders accordingly - Yes  Labs reviewed in EPIC  BP Readings from Last 3 Encounters:   10/04/18 120/80   09/21/18 127/85   07/18/18 120/80    Wt Readings from Last 3 Encounters:   10/04/18 207 lb 1.6 oz (93.9 kg)   09/21/18 210 lb 3.2 oz (95.3 kg)   07/18/18 205 lb (93 kg)            Patient Active Problem List   Diagnosis     Varicose veins     Left leg pain     DVT (deep venous thrombosis) (H)     PTSD (post-traumatic stress disorder)     Tobacco use disorder     Venous insufficiency of leg-left     Prediabetes     Thumb injury     Anxiety     Gastroesophageal reflux disease without esophagitis     Galactorrhea of left breast     Generalized hyperhidrosis     Fatigue     CARDIOVASCULAR SCREENING; LDL GOAL LESS THAN 160     Depression with anxiety     Past Surgical History:   Procedure Laterality Date     LAPAROSCOPIC CHOLECYSTECTOMY  10/9/2012    Procedure: LAPAROSCOPIC CHOLECYSTECTOMY;  Laparoscopic Cholecystectomy;  Surgeon: Martell Briscoe " MD Renaldo;  Location: [facility] OR     LAPAROSCOPIC TUBAL LIGATION         Social History   Substance Use Topics     Smoking status: Former Smoker     Packs/day: 0.50     Years: 10.00     Types: Cigarettes     Quit date: 10/16/2012     Smokeless tobacco: Never Used      Comment: on e cigs now trying to quit     Alcohol use Yes      Comment: Maybe once a year     Family History   Problem Relation Age of Onset     Diabetes Maternal Grandfather      Other - See Comments Maternal Grandmother      24 yrs old blood clot     Depression Mother      Diabetes Other      C.A.D. No family hx of      Cerebrovascular Disease No family hx of      Hypertension No family hx of      Anesthesia Reaction No family hx of      Arthritis No family hx of      Asthma No family hx of      Breast Cancer No family hx of      Cancer - colorectal No family hx of      Prostate Cancer No family hx of      Thyroid Disease No family hx of      Lipids No family hx of      Congenital Anomalies No family hx of          Current Outpatient Prescriptions   Medication Sig Dispense Refill     albuterol (PROAIR HFA/PROVENTIL HFA/VENTOLIN HFA) 108 (90 Base) MCG/ACT inhaler Inhale 2 puffs into the lungs every 4 hours as needed for shortness of breath / dyspnea or wheezing 1 Inhaler 1     albuterol (PROAIR HFA/PROVENTIL HFA/VENTOLIN HFA) 108 (90 Base) MCG/ACT inhaler Inhale 2 puffs into the lungs every 4 hours as needed for shortness of breath / dyspnea or wheezing 1 Inhaler 0     cyclobenzaprine (FLEXERIL) 10 MG tablet Take 0.5-1 tablets (5-10 mg) by mouth 3 times daily as needed for muscle spasms 30 tablet 1     ibuprofen (ADVIL/MOTRIN) 800 MG tablet Take 1 tablet (800 mg) by mouth every 8 hours as needed for moderate pain 30 tablet 1     omeprazole (PRILOSEC) 20 MG CR capsule Take 1 capsule (20 mg) by mouth daily 30 capsule 1     order for DME Compression  stocking to legs bilaterally. 10-20 mm of pressure. 2 each 1     venlafaxine (EFFEXOR-XR) 150 MG 24 hr capsule  Take 1 capsule (150 mg) by mouth daily 90 capsule 3     vitamin D (ERGOCALCIFEROL) 77231 UNIT capsule Take 1 capsule (50,000 Units) by mouth every 7 days for 8 doses 8 capsule 0     acetaminophen-codeine (TYLENOL #3) 300-30 MG per tablet Take 1 tablet by mouth every 6 hours as needed for severe pain (Patient not taking: Reported on 9/21/2018) 6 tablet 0     Allergies   Allergen Reactions     Chantix [Varenicline Tartrate] Anxiety       Patient under age 50, mutual decision reflected in health maintenance.    Pertinent mammograms are reviewed under the imaging tab.  History of abnormal Pap smear: NO - age 30- 65 PAP every 3 years recommended  PAP / HPV 10/16/2014 9/7/2011   PAP NIL NIL     Reviewed and updated as needed this visit by clinical staff  Tobacco  Allergies  Meds  Med Hx  Surg Hx  Fam Hx  Soc Hx      Reviewed and updated as needed this visit by Provider  Med Hx  Surg Hx        Past Medical History:   Diagnosis Date     Depression      DVT (deep venous thrombosis) (H) 4/2011    left leg, was on lovenox and coumadin but have been off since November 2011     H/O tubal ligation     2006      Prediabetes       Past Surgical History:   Procedure Laterality Date     LAPAROSCOPIC CHOLECYSTECTOMY  10/9/2012    Procedure: LAPAROSCOPIC CHOLECYSTECTOMY;  Laparoscopic Cholecystectomy;  Surgeon: Martell Briscoe MD;  Location: [facility] OR     LAPAROSCOPIC TUBAL LIGATION       Obstetric History     No data available          ROS:  CONSTITUTIONAL: Had respiratory infection 3 weeks ago, was given Z-mariel. Still having coughing, intermittent wheezing which is worse in the morning  INTEGUMENTARY/SKIN: NEGATIVE for worrisome rashes, moles or lesions  EYES: NEGATIVE for vision changes or irritation, is going to the eye doctor soon  ENT: NEGATIVE for ear, mouth and throat problems  RESP: see above, POSITIVE for cough-non productive and wheezing, Hx of smoking, quit 2012. NEGATIVE for shortness of breath  BREAST: NEGATIVE for  masses, tenderness or discharge  CV: NEGATIVE for chest pain, palpitations or peripheral edema  GI: POSITIVE for heartburn and reflux, worse at night, NEGATIVE for constipation, diarrhea, gas or bloating, nausea, vomiting and weight loss   female: no unusual urinary symptoms and no unusual vaginal symptoms  MUSCULOSKELETAL: NEGATIVE for significant arthralgias or myalgia  NEURO: POSITIVE for dizziness/lightheadedness--chronic for 15 years, never been addressed. NEGATIVE for weakness and numbness and tingling  ENDOCRINE: NEGATIVE for temperature intolerance, skin/hair changes  HEME/ALLERGY/IMMUNE: NEGATIVE for bleeding problems and POSITIVE  for previous DVT  PSYCHIATRIC: POSITIVE for anxiety, denies suicidal ideations     OBJECTIVE:   /80 (BP Location: Right arm, Patient Position: Sitting, Cuff Size: Adult Regular)  Pulse 84  Temp 97.3  F (36.3  C) (Temporal)  Wt 207 lb 1.6 oz (93.9 kg)  LMP 10/03/2018  SpO2 97%  Breastfeeding? No  BMI 32.44 kg/m2  EXAM:  GENERAL: healthy, alert and no distress  EYES: Eyes grossly normal to inspection, PERRL and conjunctivae and sclerae normal  HENT: ear canals and TM's normal, nose and mouth without ulcers or lesions  NECK: no adenopathy, no asymmetry, masses, or scars and thyroid normal to palpation  RESP: lungs clear to auscultation - no rales, rhonchi or wheezes, unable to hear any wheezing with forced expiration, pt likely having bronchospasms in the mornings  BREAST: normal without masses, tenderness or nipple discharge and no palpable axillary masses or adenopathy  CV: regular rates and rhythm, normal S1 S2, no S3 or S4, no murmur, click or rub, peripheral pulses strong, no peripheral edema and varicosities on distal right lower leg  ABDOMEN: soft, nontender, no hepatosplenomegaly, no masses and bowel sounds normal   (female): deferred-pt on her menses and would prefer to come back at a later date  MS: no gross musculoskeletal defects noted, no edema  SKIN:  no suspicious lesions or rashes  NEURO: Normal strength and tone, mentation intact and speech normal  PSYCH: mentation appears normal, affect normal/bright  LYMPH: no cervical, supraclavicular, axillary, or inguinal adenopathy    Diagnostic Test Results:  Results for orders placed or performed in visit on 10/04/18   Ferritin   Result Value Ref Range    Ferritin 43 12 - 150 ng/mL   CBC with platelets   Result Value Ref Range    WBC 7.3 4.0 - 11.0 10e9/L    RBC Count 4.05 3.8 - 5.2 10e12/L    Hemoglobin 12.7 11.7 - 15.7 g/dL    Hematocrit 37.9 35.0 - 47.0 %    MCV 94 78 - 100 fl    MCH 31.4 26.5 - 33.0 pg    MCHC 33.5 31.5 - 36.5 g/dL    RDW 13.5 10.0 - 15.0 %    Platelet Count 342 150 - 450 10e9/L   TSH with free T4 reflex   Result Value Ref Range    TSH 1.34 0.40 - 4.00 mU/L   Vitamin D Deficiency   Result Value Ref Range    Vitamin D Deficiency screening 11 (L) 20 - 75 ug/L   '    ASSESSMENT/PLAN:   Roxane was seen today for physical.    Diagnoses and all orders for this visit:    Encounter for routine adult health examination without abnormal findings    Depression with anxiety  -     Vitamin D Deficiency  -     Discontinue: venlafaxine (EFFEXOR-XR) 150 MG 24 hr capsule; TAKE 1 CAPSULE (75 MG) BY MOUTH DAILY  -     MENTAL HEALTH REFERRAL  - Adult; Outpatient Treatment; Individual/Couples/Family/Group Therapy/Health Psychology; [facility] [place]: [facility] [phone]; We will contact you to schedule the appointment or please call with any questions  -     venlafaxine (EFFEXOR-XR) 150 MG 24 hr capsule; Take 1 capsule (150 mg) by mouth daily  Change dose of medication to Effexor  mg q day.  Reviewed concept of depression as function of biochemical imbalance of neurotransmitters/rationale for treatment.  Risks and benefits of medication(s) reviewed with patient.  Questions answered.  Counseling advised  Followup appointment in 1 month(s)  Patient instructed to call for significant side effects medications  or problems  Patient advised immediate presentation to hospital for suicidal thought, etc.    Gastroesophageal reflux disease without esophagitis  -     omeprazole (PRILOSEC) 20 MG CR capsule; Take 1 capsule (20 mg) by mouth daily  Discussed that these symptoms is likely related to reflux or GERD. Discussed non-pharmacologic treatment, including elevating the head of bed, decrease food before bedtime and decreased caffeine and nicotine and alcohol.  Went over meds for reflux. Pt will try Prilosec . Recheck if not improving as expected.    Pain of left lower extremity  -     cyclobenzaprine (FLEXERIL) 10 MG tablet; Take 0.5-1 tablets (5-10 mg) by mouth 3 times daily as needed for muscle spasms  -     VASCULAR SURGERY REFERRAL    Varicose veins of both lower extremities with pain  -     VASCULAR SURGERY REFERRAL  -     order for DME; Compression  stocking to legs bilaterally. 10-20 mm of pressure.    Screening for disorder of blood and blood-forming organs  -     Ferritin  -     CBC with platelets    Screening for thyroid disorder  -     TSH with free T4 reflex    Bronchospasm, acute  -     albuterol (PROAIR HFA/PROVENTIL HFA/VENTOLIN HFA) 108 (90 Base) MCG/ACT inhaler; Inhale 2 puffs into the lungs every 4 hours as needed for shortness of breath / dyspnea or wheezing    Encounter for screening mammogram for breast cancer  -     MA Screening Digital Bilateral; Future    Screening for malignant neoplasm of cervix    Excessive daytime sleepiness  -     SLEEP EVALUATION & MANAGEMENT REFERRAL - ADULT -[facility] [place]  [phone] (Age 15 and up); Future    Vitamin D insufficiency  -     **Vitamin D Deficiency FUTURE anytime; Future  -     vitamin D (ERGOCALCIFEROL) 41857 UNIT capsule; Take 1 capsule (50,000 Units) by mouth every 7 days for 8 doses    Other orders  -     Cancel: venlafaxine (EFFEXOR-XR) 75 MG 24 hr capsule; TAKE 1 CAPSULE (75 MG) BY MOUTH DAILY      PLAN:   1.   Symptomatic therapy  suggested: Increase effexor to 150 mg a day  Will try prilosec for reflux  Will add on Albuterol every 4 hours for the next 48 hours, then as needed for cough    2.  Orders Placed This Encounter   Medications     cyclobenzaprine (FLEXERIL) 10 MG tablet     Sig: Take 0.5-1 tablets (5-10 mg) by mouth 3 times daily as needed for muscle spasms     Dispense:  30 tablet     Refill:  1     omeprazole (PRILOSEC) 20 MG CR capsule     Sig: Take 1 capsule (20 mg) by mouth daily     Dispense:  30 capsule     Refill:  1     albuterol (PROAIR HFA/PROVENTIL HFA/VENTOLIN HFA) 108 (90 Base) MCG/ACT inhaler     Sig: Inhale 2 puffs into the lungs every 4 hours as needed for shortness of breath / dyspnea or wheezing     Dispense:  1 Inhaler     Refill:  1     DISCONTD: venlafaxine (EFFEXOR-XR) 150 MG 24 hr capsule     Sig: TAKE 1 CAPSULE (75 MG) BY MOUTH DAILY     Dispense:  90 capsule     Refill:  3     order for DME     Sig: Compression  stocking to legs bilaterally. 10-20 mm of pressure.     Dispense:  2 each     Refill:  1     venlafaxine (EFFEXOR-XR) 150 MG 24 hr capsule     Sig: Take 1 capsule (150 mg) by mouth daily     Dispense:  90 capsule     Refill:  3     Orders Placed This Encounter   Procedures     MA Screening Digital Bilateral     Ferritin     CBC with platelets     TSH with free T4 reflex     Vitamin D Deficiency     SLEEP EVALUATION & MANAGEMENT REFERRAL - ADULT -[facility]  [phone] (Age 15 and up)     VASCULAR SURGERY REFERRAL     MENTAL HEALTH REFERRAL  - Adult; Outpatient Treatment; Individual/Couples/Family/Group Therapy/Health Psychology; G: [facility] [phone]; We will contact you to schedule the appointment or please call with any questions       3. Patient needs to follow up in if no improvement,or sooner if worsening of symptoms or other symptoms develop.  CONSULTATION/REFERRAL to vascular specialist and for sleep study and counseling   Will follow up  "and/or notify patient of  results via My Chart to determine further need for followup  Come back for pap only   Follow up office visit in one year for annual health maintenance exam, sooner PRN.  COUNSELING:   Reviewed preventive health counseling, as reflected in patient instructions       Regular exercise       Healthy diet/nutrition       Immunizations    Vaccinated for: Influenza      BP Readings from Last 1 Encounters:   10/04/18 120/80     Estimated body mass index is 32.44 kg/(m^2) as calculated from the following:    Height as of 7/18/18: 5' 7\" (1.702 m).    Weight as of this encounter: 207 lb 1.6 oz (93.9 kg).      Weight management plan: Discussed healthy diet and exercise guidelines and patient will follow up in 12 months in clinic to re-evaluate.     reports that she quit smoking about 5 years ago. Her smoking use included Cigarettes. She has a 5.00 pack-year smoking history. She has never used smokeless tobacco.      Counseling Resources:  ATP IV Guidelines  Pooled Cohorts Equation Calculator  Breast Cancer Risk Calculator  FRAX Risk Assessment  ICSI Preventive Guidelines  Dietary Guidelines for Americans, 2010  USDA's MyPlate  ASA Prophylaxis  Lung CA Screening      MEENAKSHI Laureano Student    I have examined the patient and agree with written evaluation. Assessment and plan presented by this provider.   GEOVANI Cortes APRN CNP  [facility]  "

## 2018-10-04 NOTE — PATIENT INSTRUCTIONS
PLAN:   1.   Symptomatic therapy suggested: Increase effexor to 150 mg a day  Will try prilosec for reflux  Will add on Albuterol every 4 hours for the next 48 hours, then as needed for cough    2.  Orders Placed This Encounter   Medications     cyclobenzaprine (FLEXERIL) 10 MG tablet     Sig: Take 0.5-1 tablets (5-10 mg) by mouth 3 times daily as needed for muscle spasms     Dispense:  30 tablet     Refill:  1     omeprazole (PRILOSEC) 20 MG CR capsule     Sig: Take 1 capsule (20 mg) by mouth daily     Dispense:  30 capsule     Refill:  1     albuterol (PROAIR HFA/PROVENTIL HFA/VENTOLIN HFA) 108 (90 Base) MCG/ACT inhaler     Sig: Inhale 2 puffs into the lungs every 4 hours as needed for shortness of breath / dyspnea or wheezing     Dispense:  1 Inhaler     Refill:  1     DISCONTD: venlafaxine (EFFEXOR-XR) 150 MG 24 hr capsule     Sig: TAKE 1 CAPSULE (75 MG) BY MOUTH DAILY     Dispense:  90 capsule     Refill:  3     order for DME     Sig: Compression  stocking to legs bilaterally. 10-20 mm of pressure.     Dispense:  2 each     Refill:  1     venlafaxine (EFFEXOR-XR) 150 MG 24 hr capsule     Sig: Take 1 capsule (150 mg) by mouth daily     Dispense:  90 capsule     Refill:  3     Orders Placed This Encounter   Procedures     MA Screening Digital Bilateral     Ferritin     CBC with platelets     TSH with free T4 reflex     Vitamin D Deficiency     SLEEP EVALUATION & MANAGEMENT REFERRAL - ADULT -[facility] [place]  [phone] (Age 15 and up)     VASCULAR SURGERY REFERRAL     MENTAL HEALTH REFERRAL  - Adult; Outpatient Treatment; Individual/Couples/Family/Group Therapy/Health Psychology; G: [facility] [phone]; We will contact you to schedule the appointment or please call with any questions       3. Patient needs to follow up in if no improvement,or sooner if worsening of symptoms or other symptoms develop.  CONSULTATION/REFERRAL to vascular specialist and for sleep study  and counseling   Will follow up and/or notify patient of  results via My Chart to determine further need for followup  Come back for pap only   Follow up office visit in one year for annual health maintenance exam, sooner PRN.    Preventive Health Recommendations  Female Ages 40 to 49    Yearly exam:     See your health care provider every year in order to  1. Review health changes.   2. Discuss preventive care.    3. Review your medicines if your doctor prescribed any.      Get a Pap test every three years (unless you have an abnormal result and your provider advises testing more often).      If you get Pap tests with HPV test, you only need to test every 5 years, unless you have an abnormal result. You do not need a Pap test if your uterus was removed (hysterectomy) and you have not had cancer.      You should be tested each year for STDs (sexually transmitted diseases), if you're at risk.     Ask your doctor if you should have a mammogram.      Have a colonoscopy (test for colon cancer) if someone in your family has had colon cancer or polyps before age 50.       Have a cholesterol test every 5 years.       Have a diabetes test (fasting glucose) after age 45. If you are at risk for diabetes, you should have this test every 3 years.    Shots: Get a flu shot each year. Get a tetanus shot every 10 years.     Nutrition:     Eat at least 5 servings of fruits and vegetables each day.    Eat whole-grain bread, whole-wheat pasta and brown rice instead of white grains and rice.    Get adequate Calcium and Vitamin D.      Lifestyle    Exercise at least 150 minutes a week (an average of 30 minutes a day, 5 days a week). This will help you control your weight and prevent disease.    Limit alcohol to one drink per day.    No smoking.     Wear sunscreen to prevent skin cancer.    See your dentist every six months for an exam and cleaning.  It was a pleasure seeing you today at the [facility].  Thank you for allowing us to care for you today. We truly hope we provided you with the excellent service you deserve. Please let us know if there is anything else we can do for you so we can be sure you are leaving completley satisfied with your care experience.       General information about your clinic   Clinic Hours Lab Hours (Appointments are required)   Mon-Thurs: 7:30 AM - 7 PM Mon-Thurs: 7:30 AM - 7 PM   Fri: 7:30 AM - 5 PM Fri: 7:30 AM - 5 PM        After Hours Nurse Advise & Appts:  [facility] Nurse Advisors: [phone]  [facility] On Call: to make appointments anytime: [phone] On Call Physician: call [phone] and answering service will page the on call physician.        For urgent appointments, please call [phone] and ask for the triage nurse or your care team clinic nurse.  How to contact my care team:  MyChart: [email or web address]   Phone: [phone]   Fax: [phone]       [facility]:   Phone: [phone]  Hours: 8:00 AM - 6:00 PM  Medication Refills:  Call your pharmacy and they will forward the refill to us. Please allow 3 business days for your refills to be completed.       Normal or non-critical lab and imaging results will be communicated to you by MyChart, letter or phone within 7 days.  If you do not hear from us within 10 days, please call the clinic. If you have a critical or abnormal lab result, we will notify you by phone as soon as possible.       We now have PWIC (Pediatric Walk in Care)  Monday-Friday from 7:30-4. Simply walk in and be seen for your urgent needs like cough, fever, rash, diarrhea or vomiting, pink eye, UTI. No appointments needed. Ask one of the team for more information      -Your Care Team:    Dr. Taylor Garcia - Internal Medicine/Pediatrics   Dr. Sindhu Arteaga - Family Medicine  Dr. Ashanti Javier - Pediatrics  Dr. Kylie Marquez - Pediatrics  Miguelina Bledsoe CNP - Family Practice Nurse Practitioner

## 2018-10-04 NOTE — TELEPHONE ENCOUNTER
"Referral received via EPIC \"in box\", per  guidelines [facility]    Allyson Garcia BSN, RN      "

## 2018-10-05 LAB — DEPRECATED CALCIDIOL+CALCIFEROL SERPL-MC: 11 UG/L (ref 20–75)

## 2018-10-05 RX ORDER — ERGOCALCIFEROL 1.25 MG/1
50000 CAPSULE, LIQUID FILLED ORAL
Qty: 8 CAPSULE | Refills: 0 | Status: SHIPPED | OUTPATIENT
Start: 2018-10-05 | End: 2019-03-12

## 2018-10-05 ASSESSMENT — ANXIETY QUESTIONNAIRES: GAD7 TOTAL SCORE: 15

## 2018-10-05 ASSESSMENT — PATIENT HEALTH QUESTIONNAIRE - PHQ9: SUM OF ALL RESPONSES TO PHQ QUESTIONS 1-9: 11

## 2018-10-05 NOTE — PROGRESS NOTES
Martinez Lopes,    Attached are your test results.  -TSH (thyroid stimulating hormone) level is normal which indicates normal thyroid function.  -Normal red blood cell (hgb) levels, normal white blood cell count and normal platelet levels.  -Ferritin (iron) level is normal.  Vitamin D level is low and oral supplementation should be started.  ADVISE: start script for Vitamin D once a week for 8 weeks and then Vitamin D3 over the counter 2000 IU daily to maintain levels. I will send the script in for you.   In 2 months, please schedule a lab only appointment to recheck Vitamin D levels (Vit D deficiency, DX: Vitamin D Deficiency)   Please contact us if you have any questions.     Please contact us if you have any questions.    Miguelina Bledsoe, CNP

## 2018-10-05 NOTE — PROGRESS NOTES
Martinez Lopes,    Attached are your test results.  -Kidney function is normal (Cr, GFR), Sodium is normal, Potassium is normal, Calcium is normal, Glucose is normal (diabetes screening test).   -LDL(bad) cholesterol level is elevated,  A diet high in fat and simple carbohydrates, genetics and being overweight can contribute to this. ADVISE: Exercise, a low fat, low carbohydrate diet and weight control are helpful to improve this.  Rechecking your fasting cholesterol panel in 12 months is recommended (Lipid w/ LDL reflex, DX:hyperlipidemia)   Please contact us if you have any questions.    Miguelina Bledsoe, CNP

## 2018-11-20 DIAGNOSIS — M79.605 PAIN OF LEFT LOWER EXTREMITY: ICD-10-CM

## 2018-11-20 RX ORDER — CYCLOBENZAPRINE HCL 10 MG
TABLET ORAL
Qty: 30 TABLET | Refills: 1 | Status: SHIPPED | OUTPATIENT
Start: 2018-11-20 | End: 2019-10-03

## 2018-11-20 NOTE — TELEPHONE ENCOUNTER
Routing refill request to provider for review/approval because:  Drug not on the [facility] refill protocol     cyclobenzaprine (FLEXERIL) 10 MG tablet 30 tablet 1 10/4/2018  --   Sig - Route: Take 0.5-1 tablets (5-10 mg) by mouth 3 times daily as needed for muscle spasms - Oral     Last OV with FABIO Bledsoe CNP: 10/4/2018    No future apt's    Deloris Whitman RN

## 2018-12-13 DIAGNOSIS — K21.9 GASTROESOPHAGEAL REFLUX DISEASE WITHOUT ESOPHAGITIS: ICD-10-CM

## 2018-12-13 NOTE — TELEPHONE ENCOUNTER
omeprazole (PRILOSEC) 20 MG CR capsule    Last Written Prescription Date:  10/4/18  Last Fill Quantity: 30 caps,  # refills: 1   Last office visit: 10/4/2018 with prescribing provider:  yes

## 2018-12-14 NOTE — TELEPHONE ENCOUNTER
Disp Refills Start End NEREIDA   omeprazole (PRILOSEC) 20 MG CR capsule 30 capsule 1 10/4/2018  --   Sig - Route: Take 1 capsule (20 mg) by mouth daily - Oral     Last OV with FABOI Bledsoe CNP: 10/4/18 Follow up office visit in one year for annual health maintenance exam, sooner PRN.    No future apt.s    Refilled per [facility] protocol.    Deloris Whitman RN

## 2019-01-30 ENCOUNTER — TELEPHONE (OUTPATIENT)
Dept: PEDIATRICS | Facility: CLINIC | Age: 44
End: 2019-01-30

## 2019-01-30 NOTE — TELEPHONE ENCOUNTER
[facility] Call Center    Phone Message    May a detailed message be left on voicemail: yes    Reason for Call: Symptoms or Concerns     If patient has red-flag symptoms, warm transfer to triage line    Current symptom or concern: Leg pain.  Patient was given a referral for a vein specialist but for insurance reasons has not been seen yet.  She is requesting a call to discuss pain management.  Please advise.  Thank you.     Symptoms have been present for: Ongoing    Has patient previously been seen for this? Yes    By Miguelina Connors    Are there any new or worsening symptoms? No new symptoms, but pain is getting worse.        Action Taken: Message routed to:  Primary Care p [phone]

## 2019-01-30 NOTE — TELEPHONE ENCOUNTER
Patient called back. States she does not have any new or worsening sx since seen but riley like to have something stronger than Ibuprofen for pain. She has been using Ibuprofen and muscle relaxer's but they are not working to relieve her discomfort. Current pain level is about 5-6. Patient denies severe pain, red streaks, severe swelling, warmth to touch, or inability to walk.     Patient will continue to monitor her sx and proceed to the ER / UC with any new or worsening sx.    Routing to provider to review and advise.     Cally Bolton RN   .[facility]

## 2019-01-30 NOTE — TELEPHONE ENCOUNTER
"Left a VM to find out if she\"s having any DVT symptoms, how long pain has been worse, and if she has any pain management ideas/requests. Stated that PCP is not in today, but will return tomorrow.     Reviewed 10/4 OV: \"Since then she has had intermittent LLE pain, \"like someone is pulling my leg\". Had the same pain about 3 months ago and was given Flexeril and that worked well--wondering if she can have more. Denies numbness and tingling. She does not want to take Ibuprofen or Narcotics.\"    Dary Best RN   "

## 2019-01-30 NOTE — TELEPHONE ENCOUNTER
If her varicose veins are what is bothering her needs to make appointment with vein specialist   May need to call her insurance company and find out where they will cover her being seen if not covered to go to [facility]

## 2019-02-06 ENCOUNTER — OFFICE VISIT (OUTPATIENT)
Dept: URGENT CARE | Facility: URGENT CARE | Age: 44
End: 2019-02-06
Payer: COMMERCIAL

## 2019-02-06 ENCOUNTER — TRANSFERRED RECORDS (OUTPATIENT)
Dept: HEALTH INFORMATION MANAGEMENT | Facility: CLINIC | Age: 44
End: 2019-02-06

## 2019-02-06 VITALS
OXYGEN SATURATION: 98 % | WEIGHT: 219 LBS | RESPIRATION RATE: 18 BRPM | HEART RATE: 94 BPM | SYSTOLIC BLOOD PRESSURE: 140 MMHG | BODY MASS INDEX: 34.3 KG/M2 | TEMPERATURE: 98.4 F | DIASTOLIC BLOOD PRESSURE: 102 MMHG

## 2019-02-06 DIAGNOSIS — M79.662 PAIN OF LEFT CALF: Primary | ICD-10-CM

## 2019-02-06 PROCEDURE — 99213 OFFICE O/P EST LOW 20 MIN: CPT | Performed by: NURSE PRACTITIONER

## 2019-02-06 RX ORDER — CYCLOBENZAPRINE HCL 10 MG
5-10 TABLET ORAL 3 TIMES DAILY PRN
Qty: 30 TABLET | Refills: 0 | Status: SHIPPED | OUTPATIENT
Start: 2019-02-06 | End: 2019-03-12

## 2019-02-06 ASSESSMENT — PAIN SCALES - GENERAL: PAINLEVEL: WORST PAIN (10)

## 2019-02-06 ASSESSMENT — ENCOUNTER SYMPTOMS
VOMITING: 0
SORE THROAT: 0
RHINORRHEA: 0
NAUSEA: 0
CHILLS: 0
COUGH: 0
SHORTNESS OF BREATH: 0
DIARRHEA: 0
HEADACHES: 0
FEVER: 0

## 2019-02-06 NOTE — PROGRESS NOTES
SUBJECTIVE:   Roxane Lopes is a 43 year old female presenting with a chief complaint of   Chief Complaint   Patient presents with     Urgent Care     Musculoskeletal Problem     Pt states that years ago she had a DVT on left leg and since Thursday has been having pain on that same leg hurst to walk , bend , laying down states pain is getting worst feels like someone is pulling apart leg.        She is an established patient of [facility].    Left calf pain    Onset of symptoms was 5 day(s) ago.  Course of illness is worsening.    Severity moderate  Current and Associated symptoms: pain and stiff left calf  Denies: falling, injury, skin rash  Treatment measures tried include None tried.  Predisposing factors include History of DVT same leg 2011      Review of Systems   Constitutional: Negative for chills and fever.   HENT: Negative for congestion, ear pain, rhinorrhea and sore throat.    Respiratory: Negative for cough and shortness of breath.    Gastrointestinal: Negative for diarrhea, nausea and vomiting.   Musculoskeletal:        Left calf pain   Neurological: Negative for headaches.   All other systems reviewed and are negative.      Past Medical History:   Diagnosis Date     Depression      DVT (deep venous thrombosis) (H) 4/2011    left leg, was on lovenox and coumadin but have been off since November 2011     H/O tubal ligation     2006      Prediabetes      Family History   Problem Relation Age of Onset     Diabetes Maternal Grandfather      Other - See Comments Maternal Grandmother         24 yrs old blood clot     Depression Mother      Diabetes Other      C.A.D. No family hx of      Cerebrovascular Disease No family hx of      Hypertension No family hx of      Anesthesia Reaction No family hx of      Arthritis No family hx of      Asthma No family hx of      Breast Cancer No family hx of      Cancer - colorectal No family hx of      Prostate Cancer No family hx of      Thyroid Disease No family hx of       Lipids No family hx of      Congenital Anomalies No family hx of      Current Outpatient Medications   Medication Sig Dispense Refill     omeprazole (PRILOSEC) 20 MG DR capsule Take 1 capsule (20 mg) by mouth daily 90 capsule 2     venlafaxine (EFFEXOR-XR) 150 MG 24 hr capsule Take 1 capsule (150 mg) by mouth daily 90 capsule 3     acetaminophen-codeine (TYLENOL #3) 300-30 MG per tablet Take 1 tablet by mouth every 6 hours as needed for severe pain (Patient not taking: Reported on 2018) 6 tablet 0     albuterol (PROAIR HFA/PROVENTIL HFA/VENTOLIN HFA) 108 (90 Base) MCG/ACT inhaler Inhale 2 puffs into the lungs every 4 hours as needed for shortness of breath / dyspnea or wheezing (Patient not taking: Reported on 2019) 1 Inhaler 1     albuterol (PROAIR HFA/PROVENTIL HFA/VENTOLIN HFA) 108 (90 Base) MCG/ACT inhaler Inhale 2 puffs into the lungs every 4 hours as needed for shortness of breath / dyspnea or wheezing (Patient not taking: Reported on 2019) 1 Inhaler 0     cyclobenzaprine (FLEXERIL) 10 MG tablet TAKE 0.5-1 TABLETS (5-10 MG) BY MOUTH 3 TIMES DAILY AS NEEDED FOR MUSCLE SPASMS (Patient not taking: Reported on 2019) 30 tablet 1     ibuprofen (ADVIL/MOTRIN) 800 MG tablet Take 1 tablet (800 mg) by mouth every 8 hours as needed for moderate pain 30 tablet 1     order for DME Compression  stocking to legs bilaterally. 10-20 mm of pressure. (Patient not taking: Reported on 2019) 2 each 1     Social History     Tobacco Use     Smoking status: Former Smoker     Packs/day: 0.50     Years: 10.00     Pack years: 5.00     Types: Cigarettes     Last attempt to quit: 10/16/2012     Years since quittin.3     Smokeless tobacco: Never Used     Tobacco comment: on e cigs now trying to quit   Substance Use Topics     Alcohol use: Yes     Comment: Maybe once a year       OBJECTIVE  BP (!) 140/102   Pulse 94   Temp 98.4  F (36.9  C) (Oral)   Resp 18   Wt 99.3 kg (219 lb)   LMP 2019   SpO2 98%    BMI 34.30 kg/m      Physical Exam   Constitutional: No distress.   HENT:   Head: Normocephalic and atraumatic.   Right Ear: Tympanic membrane and external ear normal.   Left Ear: Tympanic membrane and external ear normal.   Mouth/Throat: Oropharynx is clear and moist.   Eyes: EOM are normal. Pupils are equal, round, and reactive to light.   Neck: Normal range of motion. Neck supple.   Pulmonary/Chest: Effort normal and breath sounds normal. No respiratory distress.   Musculoskeletal:   varicosities noted on left calf, tender to touch   Lymphadenopathy:     She has no cervical adenopathy.   Neurological: She is alert. No cranial nerve deficit.   Skin: Skin is warm and dry. She is not diaphoretic.   Psychiatric: She has a normal mood and affect.   Nursing note and vitals reviewed.        ASSESSMENT:      ICD-10-CM    1. Pain of left calf M79.662 US Lower Extremity Venous Duplex Left          PLAN:  Immediate order placed for lower extremity venous Doppler.  Patient will go to [facility] in [place].  Directions given.   Addendum-Verbal report from radiology no DVT.   I have discussed results with patient.   There are no Patient Instructions on file for this visit.

## 2019-03-12 ENCOUNTER — OFFICE VISIT (OUTPATIENT)
Dept: FAMILY MEDICINE | Facility: CLINIC | Age: 44
End: 2019-03-12

## 2019-03-12 VITALS
RESPIRATION RATE: 16 BRPM | OXYGEN SATURATION: 97 % | HEART RATE: 98 BPM | WEIGHT: 222 LBS | DIASTOLIC BLOOD PRESSURE: 81 MMHG | BODY MASS INDEX: 34.77 KG/M2 | TEMPERATURE: 97.7 F | SYSTOLIC BLOOD PRESSURE: 117 MMHG

## 2019-03-12 DIAGNOSIS — R06.02 SHORTNESS OF BREATH: ICD-10-CM

## 2019-03-12 DIAGNOSIS — Z86.718 HISTORY OF DVT (DEEP VEIN THROMBOSIS): ICD-10-CM

## 2019-03-12 DIAGNOSIS — J40 BRONCHITIS: Primary | ICD-10-CM

## 2019-03-12 LAB
BASOPHILS # BLD AUTO: 0 10E9/L (ref 0–0.2)
BASOPHILS NFR BLD AUTO: 0.2 %
D DIMER PPP FEU-MCNC: 0.3 UG/ML FEU (ref 0–0.5)
DEPRECATED S PYO AG THROAT QL EIA: NORMAL
DIFFERENTIAL METHOD BLD: ABNORMAL
EOSINOPHIL # BLD AUTO: 0 10E9/L (ref 0–0.7)
EOSINOPHIL NFR BLD AUTO: 0.5 %
ERYTHROCYTE [DISTWIDTH] IN BLOOD BY AUTOMATED COUNT: 14.2 % (ref 10–15)
FLUAV+FLUBV AG SPEC QL: NEGATIVE
FLUAV+FLUBV AG SPEC QL: NEGATIVE
HCT VFR BLD AUTO: 36.9 % (ref 35–47)
HGB BLD-MCNC: 12.4 G/DL (ref 11.7–15.7)
LYMPHOCYTES # BLD AUTO: 0.5 10E9/L (ref 0.8–5.3)
LYMPHOCYTES NFR BLD AUTO: 9.1 %
MCH RBC QN AUTO: 31.9 PG (ref 26.5–33)
MCHC RBC AUTO-ENTMCNC: 33.6 G/DL (ref 31.5–36.5)
MCV RBC AUTO: 95 FL (ref 78–100)
MONOCYTES # BLD AUTO: 0.5 10E9/L (ref 0–1.3)
MONOCYTES NFR BLD AUTO: 9.5 %
NEUTROPHILS # BLD AUTO: 4.4 10E9/L (ref 1.6–8.3)
NEUTROPHILS NFR BLD AUTO: 80.7 %
PLATELET # BLD AUTO: 250 10E9/L (ref 150–450)
RBC # BLD AUTO: 3.89 10E12/L (ref 3.8–5.2)
SPECIMEN SOURCE: NORMAL
SPECIMEN SOURCE: NORMAL
WBC # BLD AUTO: 5.5 10E9/L (ref 4–11)

## 2019-03-12 PROCEDURE — 85025 COMPLETE CBC W/AUTO DIFF WBC: CPT | Performed by: NURSE PRACTITIONER

## 2019-03-12 PROCEDURE — 36415 COLL VENOUS BLD VENIPUNCTURE: CPT | Performed by: NURSE PRACTITIONER

## 2019-03-12 PROCEDURE — 87804 INFLUENZA ASSAY W/OPTIC: CPT | Performed by: NURSE PRACTITIONER

## 2019-03-12 PROCEDURE — 87081 CULTURE SCREEN ONLY: CPT | Performed by: NURSE PRACTITIONER

## 2019-03-12 PROCEDURE — 85379 FIBRIN DEGRADATION QUANT: CPT | Performed by: NURSE PRACTITIONER

## 2019-03-12 PROCEDURE — 99214 OFFICE O/P EST MOD 30 MIN: CPT | Performed by: NURSE PRACTITIONER

## 2019-03-12 PROCEDURE — 87880 STREP A ASSAY W/OPTIC: CPT | Performed by: NURSE PRACTITIONER

## 2019-03-12 RX ORDER — ALBUTEROL SULFATE 90 UG/1
2 AEROSOL, METERED RESPIRATORY (INHALATION) EVERY 6 HOURS
Qty: 8.5 G | Refills: 3 | Status: SHIPPED | OUTPATIENT
Start: 2019-03-12 | End: 2019-10-03

## 2019-03-12 RX ORDER — AZITHROMYCIN 250 MG/1
TABLET, FILM COATED ORAL
Qty: 6 TABLET | Refills: 0 | Status: SHIPPED | OUTPATIENT
Start: 2019-03-12 | End: 2019-04-20

## 2019-03-12 RX ORDER — PREDNISONE 20 MG/1
40 TABLET ORAL DAILY
Qty: 10 TABLET | Refills: 0 | Status: SHIPPED | OUTPATIENT
Start: 2019-03-12 | End: 2019-04-20

## 2019-03-12 ASSESSMENT — PAIN SCALES - GENERAL: PAINLEVEL: MILD PAIN (2)

## 2019-03-12 NOTE — RESULT ENCOUNTER NOTE
Roxane,  Your lab results for blood clot were normal. I hope you're feeling better with the medications we started this morning!  Please let us know if you have any questions.  Thank you for allowing us to participate in your care.  MEENAKSHI Loza CNP

## 2019-03-12 NOTE — LETTER
March 12, 2019      Roxane Lopes  [address] [place] [id number]        To Whom It May Concern:    Roxane Lopes  was seen on 3/12/2019.  Please excuse her until 3/14/2019 due to illness.        Sincerely,        MEENAKSHI Loza CNP

## 2019-03-12 NOTE — PROGRESS NOTES
"  SUBJECTIVE:   Roxane Lopes is a 43 year old female who presents to clinic today for the following health issues:      Acute Illness   Acute illness concerns: URI  Onset: 1 week ago    Fever: YES    Chills/Sweats: YES    Headache (location?): YES    Sinus Pressure:no    Conjunctivitis:  no    Ear Pain: YES: bilateral    Rhinorrhea: YES    Congestion: no     Sore Throat: YES     Cough: YES-with shortness of breath    Wheeze: YES    Decreased Appetite: YES    Nausea: no     Vomiting: no     Diarrhea:  YES    Dysuria/Freq.: no     Fatigue/Achiness: YES    Sick/Strep Exposure: no     Therapies Tried and outcome: OTC cold and flu meds    Pleasant 43-year-old female presents with the above concerns.  Feels like it is \"in her lungs.\"  Head hurts when she coughs. Feels short of breath.  Fever 102 yesterday.  No flu shot.  She is a history of a DVT in her leg 6 years ago.  No chest pain.      Problem list and histories reviewed & adjusted, as indicated.  Additional history: as documented    Patient Active Problem List   Diagnosis     Varicose veins     Left leg pain     DVT (deep venous thrombosis) (H)     PTSD (post-traumatic stress disorder)     Tobacco use disorder     Venous insufficiency of leg-left     Prediabetes     Thumb injury     Anxiety     Gastroesophageal reflux disease without esophagitis     Galactorrhea of left breast     Generalized hyperhidrosis     Fatigue     CARDIOVASCULAR SCREENING; LDL GOAL LESS THAN 160     Depression with anxiety     Past Surgical History:   Procedure Laterality Date     LAPAROSCOPIC CHOLECYSTECTOMY  10/9/2012    Procedure: LAPAROSCOPIC CHOLECYSTECTOMY;  Laparoscopic Cholecystectomy;  Surgeon: Martell Briscoe MD;  Location: [facility] OR     LAPAROSCOPIC TUBAL LIGATION         Social History     Tobacco Use     Smoking status: Former Smoker     Packs/day: 0.50     Years: 10.00     Pack years: 5.00     Types: Cigarettes     Last attempt to quit: 10/16/2012     Years since quittin.4 "     Smokeless tobacco: Never Used     Tobacco comment: on e cigs now trying to quit   Substance Use Topics     Alcohol use: Yes     Comment: Maybe once a year     Family History   Problem Relation Age of Onset     Diabetes Maternal Grandfather      Other - See Comments Maternal Grandmother         24 yrs old blood clot     Depression Mother      Diabetes Other      C.A.D. No family hx of      Cerebrovascular Disease No family hx of      Hypertension No family hx of      Anesthesia Reaction No family hx of      Arthritis No family hx of      Asthma No family hx of      Breast Cancer No family hx of      Cancer - colorectal No family hx of      Prostate Cancer No family hx of      Thyroid Disease No family hx of      Lipids No family hx of      Congenital Anomalies No family hx of          Current Outpatient Medications   Medication Sig Dispense Refill     albuterol (PROAIR HFA/PROVENTIL HFA/VENTOLIN HFA) 108 (90 Base) MCG/ACT inhaler Inhale 2 puffs into the lungs every 6 hours 8.5 g 3     azithromycin (ZITHROMAX) 250 MG tablet Take 2 tablets (500 mg) by mouth daily for 1 day, THEN 1 tablet (250 mg) daily for 4 days. 6 tablet 0     omeprazole (PRILOSEC) 20 MG DR capsule Take 1 capsule (20 mg) by mouth daily 90 capsule 2     predniSONE (DELTASONE) 20 MG tablet Take 40 mg by mouth daily for 5 days. 10 tablet 0     venlafaxine (EFFEXOR-XR) 150 MG 24 hr capsule Take 1 capsule (150 mg) by mouth daily 90 capsule 3     acetaminophen-codeine (TYLENOL #3) 300-30 MG per tablet Take 1 tablet by mouth every 6 hours as needed for severe pain (Patient not taking: Reported on 9/21/2018) 6 tablet 0     cyclobenzaprine (FLEXERIL) 10 MG tablet TAKE 0.5-1 TABLETS (5-10 MG) BY MOUTH 3 TIMES DAILY AS NEEDED FOR MUSCLE SPASMS (Patient not taking: Reported on 2/6/2019) 30 tablet 1     ibuprofen (ADVIL/MOTRIN) 800 MG tablet Take 1 tablet (800 mg) by mouth every 8 hours as needed for moderate pain 30 tablet 1     order for DME Compression   stocking to legs bilaterally. 10-20 mm of pressure. (Patient not taking: Reported on 2/6/2019) 2 each 1     Allergies   Allergen Reactions     Chantix [Varenicline Tartrate] Anxiety       Reviewed and updated as needed this visit by clinical staff  Tobacco  Allergies  Meds  Problems  Med Hx  Surg Hx  Fam Hx  Soc Hx        Reviewed and updated as needed this visit by Provider  Tobacco  Allergies  Meds  Problems  Med Hx  Surg Hx  Fam Hx         ROS:  Constitutional, HEENT, cardiovascular, pulmonary, GI, , musculoskeletal, neuro, skin, endocrine and psych systems are negative, except as otherwise noted.    OBJECTIVE:     /81 (BP Location: Right arm, Patient Position: Chair, Cuff Size: Adult Regular)   Pulse 98   Temp 97.7  F (36.5  C) (Oral)   Resp 16   Wt 100.7 kg (222 lb)   LMP 02/14/2019 (Exact Date)   SpO2 97%   Breastfeeding? No   BMI 34.77 kg/m    Body mass index is 34.77 kg/m .  GENERAL: healthy, alert and no distress  EYES: Eyes grossly normal to inspection, PERRL and conjunctivae and sclerae normal  HENT: ear canals and TM's normal, nose and mouth without ulcers or lesions  NECK: no adenopathy, no asymmetry, masses, or scars and thyroid normal to palpation  RESP: lungs clear to auscultation - no rales, rhonchi or wheezes  CV: regular rate and rhythm, normal S1 S2, no S3 or S4, no murmur, click or rub, no peripheral edema and peripheral pulses strong  MS: no gross musculoskeletal defects noted, no edema  PSYCH: mentation appears normal, affect normal/bright    Diagnostic Test Results:  Results for orders placed or performed in visit on 03/12/19 (from the past 24 hour(s))   Influenza A/B antigen   Result Value Ref Range    Influenza A/B Agn Specimen Nasal     Influenza A Negative NEG^Negative    Influenza B Negative NEG^Negative   Rapid strep screen   Result Value Ref Range    Specimen Description Throat     Rapid Strep A Screen       NEGATIVE: No Group A streptococcal antigen  detected by immunoassay, await culture report.   CBC with platelets differential   Result Value Ref Range    WBC 5.5 4.0 - 11.0 10e9/L    RBC Count 3.89 3.8 - 5.2 10e12/L    Hemoglobin 12.4 11.7 - 15.7 g/dL    Hematocrit 36.9 35.0 - 47.0 %    MCV 95 78 - 100 fl    MCH 31.9 26.5 - 33.0 pg    MCHC 33.6 31.5 - 36.5 g/dL    RDW 14.2 10.0 - 15.0 %    Platelet Count 250 150 - 450 10e9/L    % Neutrophils 80.7 %    % Lymphocytes 9.1 %    % Monocytes 9.5 %    % Eosinophils 0.5 %    % Basophils 0.2 %    Absolute Neutrophil 4.4 1.6 - 8.3 10e9/L    Absolute Lymphocytes 0.5 (L) 0.8 - 5.3 10e9/L    Absolute Monocytes 0.5 0.0 - 1.3 10e9/L    Absolute Eosinophils 0.0 0.0 - 0.7 10e9/L    Absolute Basophils 0.0 0.0 - 0.2 10e9/L    Diff Method Automated Method    D dimer, quantitative   Result Value Ref Range    D Dimer 0.3 0.0 - 0.50 ug/ml FEU       ASSESSMENT/PLAN:       ICD-10-CM    1. Bronchitis J40 Influenza A/B antigen     Rapid strep screen     albuterol (PROAIR HFA/PROVENTIL HFA/VENTOLIN HFA) 108 (90 Base) MCG/ACT inhaler     azithromycin (ZITHROMAX) 250 MG tablet     predniSONE (DELTASONE) 20 MG tablet     Beta strep group A culture   2. Shortness of breath R06.02 CBC with platelets differential     D dimer, quantitative     albuterol (PROAIR HFA/PROVENTIL HFA/VENTOLIN HFA) 108 (90 Base) MCG/ACT inhaler     azithromycin (ZITHROMAX) 250 MG tablet     predniSONE (DELTASONE) 20 MG tablet     Beta strep group A culture   3. History of DVT (deep vein thrombosis) Z86.718 D dimer, quantitative     Given hx of DVT and shortness of breath, checked d dimer which was normal. Will treat for bronchitis as above. Follow up if not improving in 5 days.    See Patient Instructions    Push fluids, rest  Start albuterol inhaler and prednisone today  Start antibiotic today. Know that your cough may not be completely gone when you're done with your antibiotics  You can use a humidifier by your bed at night. Distilled water should be used with the  humidifier.  Tylenol or ibuprofen as needed for pain or fever  Follow up if you are worsening or not improving in 5 days, sooner if needed    The benefits, risks and potential side effects were discussed in detail. Black box warnings discussed as relevant. All patient questions were answered. The patient was instructed to follow up immediately if any adverse reactions develop.    Return precautions discussed, including when to seek urgent/emergent care.    Patient verbalizes understanding and agrees with plan of care. Patient stable for discharge.      MEENAKSHI Loza [facility]

## 2019-03-12 NOTE — PATIENT INSTRUCTIONS
Push fluids, rest  Start albuterol inhaler and prednisone today  Start antibiotic today. Know that your cough may not be completely gone when you're done with your antibiotics  You can use a humidifier by your bed at night. Distilled water should be used with the humidifier.  Tylenol or ibuprofen as needed for pain or fever  Follow up if you are worsening or not improving in 5 days, sooner if needed    At [facility], we strive to deliver an exceptional experience to you, every time we see you.  If you receive a survey in the mail, please send us back your thoughts. We really do value your feedback.    Based on your medical history, these are the current health maintenance/preventive care services that you are due for (some may have been done at this visit.)  Health Maintenance Due   Topic Date Due     HIV SCREEN (SYSTEM ASSIGNED)  04/16/1995     PAP Q3 YR  10/16/2017     INFLUENZA VACCINE (1) 09/01/2018     PHQ-9 Q6 MONTHS  04/04/2019         Suggested websites for health information:  Www.[place].org : Up to date and easily searchable information on multiple topics.  Www.medlineplus.gov : medication info, interactive tutorials, watch real surgeries online  Www.familydoctor.org : good info from the Academy of Family Physicians  Www.cdc.gov : public health info, travel advisories, epidemics (H1N1)  Www.aap.org : children's health info, normal development, vaccinations  Www.health.state.[place].us : [place] dept of health, public health issues in [place], N1N1    Your care team:                            Family Medicine Internal Medicine   MD Charles Felton MD Shantel Branch-Fleming, MD Katya Georgiev PA-C Nam Ho, MD Pediatrics   DAVE Roland CNP Amelia Massimini APRN CNP Shaista Malik, MD Bethany Templen, MD Deborah Mielke, MD Kim Thein, APRN GEOVANI      Clinic hours: Monday - Thursday 7 am-7 pm; Fridays 7 am-5 pm.   Urgent care: Monday - Friday 11 am-9 pm;  Saturday and Sunday 9 am-5 pm.  Pharmacy : Monday -Thursday 8 am-8 pm; Friday 8 am-6 pm; Saturday and Sunday 9 am-5 pm.     Clinic: [phone]   Pharmacy: [phone]    Patient Education     Bronchitis, Antibiotic Treatment (Adult)    Bronchitis is an infection of the air passages (bronchial tubes) in your lungs. It often occurs when you have a cold. This illness is contagious during the first few days and is spread through the air by coughing and sneezing, or by direct contact (touching the sick person and then touching your own eyes, nose, or mouth).  Symptoms of bronchitis include cough with mucus (phlegm) and low-grade fever. Bronchitis usually lasts 7 to 14 days. Mild cases can be treated with simple home remedies. More severe infection is treated with an antibiotic.  Home care  Follow these guidelines when caring for yourself at home:    If your symptoms are severe, rest at home for the first 2 to 3 days. When you go back to your usual activities, don't let yourself get too tired.    Don't smoke. Also stay away from secondhand smoke.    You may use over-the-counter medicines to control fever or pain, unless another medicine was prescribed. If you have chronic liver or kidney disease or have ever had a stomach ulcer or gastrointestinal bleeding, talk with your healthcare provider before using these medicines. Also talk to your provider if you are taking medicine to prevent blood clots. Aspirin should never be given to anyone younger than 18 who is ill with a viral infection or fever. It may cause severe liver or brain damage.    Your appetite may be low, so a light diet is fine. Stay well hydrated by drinking 6 to 8 glasses of fluids per day. This includes water, soft drinks, sports drinks, juices, tea, or soup. Extra fluids will help loosen mucus in your nose and lungs.    Over-the-counter cough, cold, and sore-throat medicines will not shorten the length of the illness, but they may be helpful to  reduce your symptoms. Don't use decongestants if you have high blood pressure.    Finish all antibiotic medicine. Do this even if you are feeling better after only a few days.  Follow-up care  Follow up with your healthcare provider, or as advised. If you had an X-ray or ECG (electrocardiogram), a specialist will review it. You will be told of any new test results that may affect your care.  If you are age 65 or older, if you smoke, or if you have a chronic lung disease or condition that affects your immune system, ask your healthcare provider about getting a pneumococcal vaccine and a yearly flu shot (influenza vaccine).  When to seek medical advice  Call your healthcare provider right away if any of these occur:    Fever of 100.4 F (38 C) or higher, or as directed by your healthcare provider    Coughing up more sputum    Weakness, drowsiness, headache, facial pain, ear pain, or a stiff neck     Call 911  Call 911 if any of these occur.    Coughing up blood    Weakness, drowsiness, headache, or stiff neck that get worse    Trouble breathing, wheezing, or pain with breathing   Date Last Reviewed: 6/1/2018 2000-2018 [employer]. [address] [place] [address], [place] [address]. All rights reserved. This information is not intended as a substitute for professional medical care. Always follow your healthcare professional's instructions.

## 2019-03-13 LAB
BACTERIA SPEC CULT: NORMAL
SPECIMEN SOURCE: NORMAL

## 2019-04-20 ENCOUNTER — OFFICE VISIT (OUTPATIENT)
Dept: URGENT CARE | Facility: URGENT CARE | Age: 44
End: 2019-04-20
Payer: COMMERCIAL

## 2019-04-20 ENCOUNTER — ANCILLARY PROCEDURE (OUTPATIENT)
Dept: GENERAL RADIOLOGY | Facility: CLINIC | Age: 44
End: 2019-04-20
Attending: NURSE PRACTITIONER
Payer: COMMERCIAL

## 2019-04-20 VITALS
HEART RATE: 110 BPM | BODY MASS INDEX: 35.71 KG/M2 | SYSTOLIC BLOOD PRESSURE: 141 MMHG | OXYGEN SATURATION: 96 % | TEMPERATURE: 98.3 F | DIASTOLIC BLOOD PRESSURE: 91 MMHG | RESPIRATION RATE: 18 BRPM | WEIGHT: 228 LBS

## 2019-04-20 DIAGNOSIS — S49.91XA ARM INJURY, RIGHT, INITIAL ENCOUNTER: Primary | ICD-10-CM

## 2019-04-20 DIAGNOSIS — S49.91XA ARM INJURY, RIGHT, INITIAL ENCOUNTER: ICD-10-CM

## 2019-04-20 PROCEDURE — 99213 OFFICE O/P EST LOW 20 MIN: CPT | Performed by: NURSE PRACTITIONER

## 2019-04-20 PROCEDURE — 73090 X-RAY EXAM OF FOREARM: CPT | Mod: RT

## 2019-04-20 PROCEDURE — 73110 X-RAY EXAM OF WRIST: CPT | Mod: RT

## 2019-04-20 ASSESSMENT — PAIN SCALES - GENERAL: PAINLEVEL: SEVERE PAIN (7)

## 2019-04-20 NOTE — PROGRESS NOTES
SUBJECTIVE:  Roxane Lopes is a 44 year old female who sustained a right wrist/arm injury 1 day ago. Mechanism of injury: got up from floor and heard noise like snap   Immediate symptoms: immediate pain. Symptoms have been worsening since that time. Prior history of related problems: no prior problems with this area in the past.  Taking ibuprofen not helping much\    Past Medical History:   Diagnosis Date     Depression      DVT (deep venous thrombosis) (H) 4/2011    left leg, was on lovenox and coumadin but have been off since November 2011     H/O tubal ligation     2006      Prediabetes      Current Outpatient Medications   Medication     omeprazole (PRILOSEC) 20 MG DR capsule     venlafaxine (EFFEXOR-XR) 150 MG 24 hr capsule     acetaminophen-codeine (TYLENOL #3) 300-30 MG per tablet     albuterol (PROAIR HFA/PROVENTIL HFA/VENTOLIN HFA) 108 (90 Base) MCG/ACT inhaler     cyclobenzaprine (FLEXERIL) 10 MG tablet     ibuprofen (ADVIL/MOTRIN) 800 MG tablet     order for DME     No current facility-administered medications for this visit.         Allergies   Allergen Reactions     Chantix [Varenicline Tartrate] Anxiety       Review Of Systems  Skin: negative  Eyes: negative  Ears/Nose/Throat: negative  Respiratory: No shortness of breath, dyspnea on exertion, cough, or hemoptysis  Cardiovascular: negative  Gastrointestinal: negative  Genitourinary: negative  Musculoskeletal: positive for right arm pain  Neurologic: negative  Psychiatric: negative  Hematologic/Lymphatic/Immunologic: negative  Endocrine: negative    OBJECTIVE:  BP (!) 141/91 (BP Location: Left arm, Patient Position: Chair, Cuff Size: Adult Large)   Pulse 110   Temp 98.3  F (36.8  C) (Oral)   Resp 18   Wt 103.4 kg (228 lb)   SpO2 96%   BMI 35.71 kg/m    Appearance: in no apparent distress.  Wrist exam: no swelling,instability; ligaments intact, FROM all hand, wrist, finger joints, radial pulse normal, sensation normal. Tenderness both side of wrist  and middle of forearm.     X-ray: no fracture or dislocation noted.    ASSESSMENT:  (S49.91XA) Arm injury, right, initial encounter  (primary encounter diagnosis)    PLAN:  Wrist brace applied  Ice, ibuprofen, rest and elevate  Home treat and monitor symptoms, call or rtc if worsening or not improving    MEENAKSHI Bee CNP

## 2019-09-24 ENCOUNTER — OFFICE VISIT (OUTPATIENT)
Dept: URGENT CARE | Facility: URGENT CARE | Age: 44
End: 2019-09-24
Payer: COMMERCIAL

## 2019-09-24 VITALS
RESPIRATION RATE: 16 BRPM | TEMPERATURE: 98.4 F | BODY MASS INDEX: 32.55 KG/M2 | SYSTOLIC BLOOD PRESSURE: 129 MMHG | HEART RATE: 97 BPM | DIASTOLIC BLOOD PRESSURE: 87 MMHG | WEIGHT: 207.8 LBS | OXYGEN SATURATION: 100 %

## 2019-09-24 DIAGNOSIS — M76.61 RIGHT ACHILLES TENDINITIS: Primary | ICD-10-CM

## 2019-09-24 DIAGNOSIS — S93.401A SPRAIN OF RIGHT ANKLE, UNSPECIFIED LIGAMENT, INITIAL ENCOUNTER: ICD-10-CM

## 2019-09-24 PROCEDURE — 99213 OFFICE O/P EST LOW 20 MIN: CPT | Performed by: FAMILY MEDICINE

## 2019-09-24 ASSESSMENT — ENCOUNTER SYMPTOMS
SORE THROAT: 0
RHINORRHEA: 0
ADENOPATHY: 0
ABDOMINAL PAIN: 0
NAUSEA: 0
BLOOD IN STOOL: 0
DIARRHEA: 0
CONSTIPATION: 0
NUMBNESS: 0
VOMITING: 0
HEADACHES: 0
FEVER: 0
COUGH: 0
CHILLS: 0
SHORTNESS OF BREATH: 0
WOUND: 0
BRUISES/BLEEDS EASILY: 0

## 2019-09-24 ASSESSMENT — PAIN SCALES - GENERAL: PAINLEVEL: MILD PAIN (3)

## 2019-09-24 NOTE — PROGRESS NOTES
SUBJECTIVE:   Roxane Lopes is a 44 year old female presenting with a chief complaint of   Chief Complaint   Patient presents with     Musculoskeletal Problem     Right foot/ankle injury. Happened 1-2 weeks ago. Hurts to put pressure on it        Walking in the yard and twisted her ankle. Achilles area pain and tenderness into the calcaneous with worsening pain with plantar flexion.     Denies previous fracture or joint pain or injuries.       Review of Systems   Constitutional: Negative for chills and fever.   HENT: Negative for congestion, ear pain, rhinorrhea and sore throat.    Respiratory: Negative for cough and shortness of breath.    Gastrointestinal: Negative for abdominal pain, blood in stool, constipation, diarrhea, nausea and vomiting.   Musculoskeletal:        Per hpi    Skin: Negative for rash and wound.   Allergic/Immunologic: Negative for environmental allergies and food allergies.   Neurological: Negative for numbness and headaches.   Hematological: Negative for adenopathy. Does not bruise/bleed easily.   Psychiatric/Behavioral: Negative for self-injury.        Hx of depression on medicine (effexor) and has been working nicely.        Past Medical History:   Diagnosis Date     Depression      DVT (deep venous thrombosis) (H) 4/2011    left leg, was on lovenox and coumadin but have been off since November 2011     H/O tubal ligation     2006      Prediabetes      Family History   Problem Relation Age of Onset     Diabetes Maternal Grandfather      Other - See Comments Maternal Grandmother         24 yrs old blood clot     Depression Mother      Diabetes Other      C.A.D. No family hx of      Cerebrovascular Disease No family hx of      Hypertension No family hx of      Anesthesia Reaction No family hx of      Arthritis No family hx of      Asthma No family hx of      Breast Cancer No family hx of      Cancer - colorectal No family hx of      Prostate Cancer No family hx of      Thyroid Disease No  family hx of      Lipids No family hx of      Congenital Anomalies No family hx of      Current Outpatient Medications   Medication Sig Dispense Refill     venlafaxine (EFFEXOR-XR) 150 MG 24 hr capsule Take 1 capsule (150 mg) by mouth daily 90 capsule 3     acetaminophen-codeine (TYLENOL #3) 300-30 MG per tablet Take 1 tablet by mouth every 6 hours as needed for severe pain (Patient not taking: Reported on 2018) 6 tablet 0     albuterol (PROAIR HFA/PROVENTIL HFA/VENTOLIN HFA) 108 (90 Base) MCG/ACT inhaler Inhale 2 puffs into the lungs every 6 hours (Patient not taking: Reported on 2019) 8.5 g 3     cyclobenzaprine (FLEXERIL) 10 MG tablet TAKE 0.5-1 TABLETS (5-10 MG) BY MOUTH 3 TIMES DAILY AS NEEDED FOR MUSCLE SPASMS (Patient not taking: Reported on 2019) 30 tablet 1     ibuprofen (ADVIL/MOTRIN) 800 MG tablet Take 1 tablet (800 mg) by mouth every 8 hours as needed for moderate pain 30 tablet 1     omeprazole (PRILOSEC) 20 MG DR capsule Take 1 capsule (20 mg) by mouth daily (Patient not taking: Reported on 2019) 90 capsule 2     order for DME Compression  stocking to legs bilaterally. 10-20 mm of pressure. (Patient not taking: Reported on 2019) 2 each 1     Social History     Tobacco Use     Smoking status: Former Smoker     Packs/day: 0.50     Years: 10.00     Pack years: 5.00     Types: Cigarettes     Last attempt to quit: 10/16/2012     Years since quittin.9     Smokeless tobacco: Never Used     Tobacco comment: on e cigs now trying to quit   Substance Use Topics     Alcohol use: Yes     Comment: Maybe once a year       OBJECTIVE  /87   Pulse 97   Temp 98.4  F (36.9  C) (Oral)   Resp 16   Wt 94.3 kg (207 lb 12.8 oz)   SpO2 100%   BMI 32.55 kg/m      Physical Exam  Pulmonary:      Effort: Pulmonary effort is normal.   Musculoskeletal:         General: Swelling (minimal lateral ankle soft tissues. ) and tenderness present.      Comments: ROM limited moderately due to pain  She  does have mild pain over the distal insertion of the achilles tendon of the right foot.   Minimal soft tissue tenderness overlying the PTF lateral ankle complex.     Gait slightly antalgic but ambulating well.   Metatarsals and 5th tuberosity not tender. Foot unremarkable.    Skin:     General: Skin is warm.      Capillary Refill: Capillary refill takes less than 2 seconds.   Neurological:      General: No focal deficit present.      Mental Status: She is alert.   Psychiatric:         Mood and Affect: Mood normal.       Xray not indicated     ASSESSMENT:    ICD-10-CM    1. Right Achilles tendinitis M76.61    2. Sprain of right ankle, unspecified ligament, initial encounter S93.401A       PLAN:  Rest, ice and NSAID   Activity modifications emphasized   Gentle pain free ROM encouraged for a few minutes 3 times daily.   Patient educational/instructional material provided including reasons for follow-up   Avoid plantar flexion if painful. Avoid jumping activities and impact activity for the next 2-3 weeks with a gradual return as tolerated.   She declined the need or a work note today   Lenny Joya MD

## 2019-10-03 ENCOUNTER — OFFICE VISIT (OUTPATIENT)
Dept: FAMILY MEDICINE | Facility: CLINIC | Age: 44
End: 2019-10-03
Payer: COMMERCIAL

## 2019-10-03 ENCOUNTER — ANCILLARY PROCEDURE (OUTPATIENT)
Dept: GENERAL RADIOLOGY | Facility: CLINIC | Age: 44
End: 2019-10-03
Attending: NURSE PRACTITIONER
Payer: COMMERCIAL

## 2019-10-03 VITALS
DIASTOLIC BLOOD PRESSURE: 86 MMHG | TEMPERATURE: 97.8 F | OXYGEN SATURATION: 100 % | WEIGHT: 209.8 LBS | SYSTOLIC BLOOD PRESSURE: 122 MMHG | HEART RATE: 91 BPM | BODY MASS INDEX: 33.72 KG/M2 | HEIGHT: 66 IN

## 2019-10-03 DIAGNOSIS — M79.671 RIGHT FOOT PAIN: ICD-10-CM

## 2019-10-03 DIAGNOSIS — M76.61 ACHILLES TENDINITIS OF RIGHT LOWER EXTREMITY: ICD-10-CM

## 2019-10-03 DIAGNOSIS — M79.671 RIGHT FOOT PAIN: Primary | ICD-10-CM

## 2019-10-03 PROCEDURE — 99214 OFFICE O/P EST MOD 30 MIN: CPT | Performed by: NURSE PRACTITIONER

## 2019-10-03 PROCEDURE — 73610 X-RAY EXAM OF ANKLE: CPT | Mod: RT

## 2019-10-03 RX ORDER — DICLOFENAC SODIUM 75 MG/1
75 TABLET, DELAYED RELEASE ORAL 2 TIMES DAILY
Qty: 60 TABLET | Refills: 1 | Status: SHIPPED | OUTPATIENT
Start: 2019-10-03 | End: 2020-12-17

## 2019-10-03 ASSESSMENT — ANXIETY QUESTIONNAIRES
3. WORRYING TOO MUCH ABOUT DIFFERENT THINGS: SEVERAL DAYS
5. BEING SO RESTLESS THAT IT IS HARD TO SIT STILL: MORE THAN HALF THE DAYS
GAD7 TOTAL SCORE: 12
6. BECOMING EASILY ANNOYED OR IRRITABLE: MORE THAN HALF THE DAYS
IF YOU CHECKED OFF ANY PROBLEMS ON THIS QUESTIONNAIRE, HOW DIFFICULT HAVE THESE PROBLEMS MADE IT FOR YOU TO DO YOUR WORK, TAKE CARE OF THINGS AT HOME, OR GET ALONG WITH OTHER PEOPLE: SOMEWHAT DIFFICULT
2. NOT BEING ABLE TO STOP OR CONTROL WORRYING: SEVERAL DAYS
7. FEELING AFRAID AS IF SOMETHING AWFUL MIGHT HAPPEN: SEVERAL DAYS
1. FEELING NERVOUS, ANXIOUS, OR ON EDGE: NEARLY EVERY DAY

## 2019-10-03 ASSESSMENT — PAIN SCALES - GENERAL: PAINLEVEL: MILD PAIN (2)

## 2019-10-03 ASSESSMENT — PATIENT HEALTH QUESTIONNAIRE - PHQ9
5. POOR APPETITE OR OVEREATING: MORE THAN HALF THE DAYS
SUM OF ALL RESPONSES TO PHQ QUESTIONS 1-9: 11

## 2019-10-03 ASSESSMENT — MIFFLIN-ST. JEOR: SCORE: 1632.37

## 2019-10-03 NOTE — PATIENT INSTRUCTIONS
For what I believe is achilles tendonitis:  Avoid aggravating activities as much as possible.  Take Voltaren (antiinflammatory) twice a day for 14 days.  Ice four times a day for 20 minutes at a time.  See podiatry for further treatment.  I also recommend physical therapy if symptoms do not improve.  Referrals have been entered for both.  Patient Education     Understanding Achilles Tendonitis    Achilles tendonitis is an overuse injury. It results in inflammation of the Achilles tendon. This tendon is found on the back of the ankle. It links the calf muscle to the heel bone. It helps you do pushing-off movements like running or standing on your toes.     How to say it  uh-KILL-eez ten-dun-I-tis   What causes Achilles tendonitis?  Achilles tendonitis can happen if you do an activity like running, walking, or jumping too much. This overuse can strain, or pull, the tendon. It may lead to minor tearing of the tendon. An injury to the lower leg or foot can also cause it.  If you don t warm up before taking part in sports such as basketball, you are more likely to suffer from this condition. You are also more prone to it if you do too much of such an activity too quickly. Proper training and rest can help prevent it.  Symptoms of Achilles tendonitis  The main symptom of Achilles tendonitis is pain. This pain mostly happens when you move the ankle. The tendon may also feel stiff after a period of no activity, such as sleeping. It may also become swollen. You may hear a crackling sound when you move your ankle.  Treatment for Achilles tendonitis  Symptoms often get better after starting treatment. A full recovery may take several months. Treatments include:    Rest. You should stop or change the activity that caused the injury. The tendon will then have time to heal.    Cold or heat pack. These help reduce pain and swelling.    Prescription or over-the-counter pain medicines. These help reduce pain and swelling.    Shoe  inserts. These devices can reduce strain on the Achilles tendon when you move. You may then feel less pain.    Stretching and strengthening exercises. Certain exercises can help you regain flexibility and strength in your Achilles tendon.    Surgery. This option can fix the injured tendon. But you don t often need it unless other treatments don t work.     When to call your healthcare provider   Call your healthcare provider right away if you have any of these:    Fever of 100.4 F (38 C) or higher, or as directed    Pain that gets worse    Symptoms that don t get better, or get worse    New symptoms    Date Last Reviewed: 3/10/2016    [id number] [employer]. [address] [place] [id number]. All rights reserved. This information is not intended as a substitute for professional medical care. Always follow your healthcare professional's instructions.           Patient Education     Understanding Achilles Tendonitis    Achilles tendonitis is an overuse injury. It results in inflammation of the Achilles tendon. This tendon is found on the back of the ankle. It links the calf muscle to the heel bone. It helps you do pushing-off movements like running or standing on your toes.     How to say it  uh-KILL-z ten-dun-I-tis   What causes Achilles tendonitis?  Achilles tendonitis can happen if you do an activity like running, walking, or jumping too much. This overuse can strain, or pull, the tendon. It may lead to minor tearing of the tendon. An injury to the lower leg or foot can also cause it.  If you don t warm up before taking part in sports such as basketball, you are more likely to suffer from this condition. You are also more prone to it if you do too much of such an activity too quickly. Proper training and rest can help prevent it.  Symptoms of Achilles tendonitis  The main symptom of Achilles tendonitis is pain. This pain mostly happens when you move the ankle. The tendon may also feel stiff  after a period of no activity, such as sleeping. It may also become swollen. You may hear a crackling sound when you move your ankle.  Treatment for Achilles tendonitis  Symptoms often get better after starting treatment. A full recovery may take several months. Treatments include:    Rest. You should stop or change the activity that caused the injury. The tendon will then have time to heal.    Cold or heat pack. These help reduce pain and swelling.    Prescription or over-the-counter pain medicines. These help reduce pain and swelling.    Shoe inserts. These devices can reduce strain on the Achilles tendon when you move. You may then feel less pain.    Stretching and strengthening exercises. Certain exercises can help you regain flexibility and strength in your Achilles tendon.    Surgery. This option can fix the injured tendon. But you don t often need it unless other treatments don t work.     When to call your healthcare provider   Call your healthcare provider right away if you have any of these:    Fever of 100.4 F (38 C) or higher, or as directed    Pain that gets worse    Symptoms that don t get better, or get worse    New symptoms    Date Last Reviewed: 3/10/2016    [id number] [employer]. [address] [place] [address]. All rights reserved. This information is not intended as a substitute for professional medical care. Always follow your healthcare professional's instructions.           Patient Education     Bent-Knee Calf Stretch    This exercise is designed to stretch and strengthen your feet and ankles. Before beginning the exercise, read through all the instructions. While exercising, breathe normally and don t bounce. If you feel any pain, stop the exercise. If pain persists, inform your healthcare provider:    Stand an arm s length away from a wall. Place the palms of your hands on the wall. Step forward about 12 inches with your ______ foot.    Keeping toes pointed forward  and both heels on the floor, bend both knees and lean forward. Hold for ______ seconds. Relax.    Repeat ______ times. Do ______ sets a day.  Date Last Reviewed: 3/1/2018    [id number] [employer]. [address] [place] [address]. All rights reserved. This information is not intended as a substitute for professional medical care. Always follow your healthcare professional's instructions.           Patient Education     Foot and Ankle Exercises: Single-Leg Heel Raise    This exercise is designed to stretch and strengthen your feet and ankles. Before beginning the exercise, read through all the instructions. While exercising, breathe normally and don t bounce. If you feel any pain, stop the exercise. If pain persists, inform your healthcare provider. Here are the steps to the single-leg heel raise:    Stand, using a sturdy counter for balance only. Lift 1 foot and stand with your weight on the other foot.    Rise up on your toes, then lower back onto your heel.    Repeat 10 times. Do 3 sets a day.  Date Last Reviewed: 10/1/2017    [id number] [employer]. [address] [place] [id number]. All rights reserved. This information is not intended as a substitute for professional medical care. Always follow your healthcare professional's instructions.

## 2019-10-03 NOTE — PROGRESS NOTES
Subjective     Roxane Lopes is a 44 year old female who presents to clinic today for the following health issues:    HPI   Joint Pain    Onset: 3 weeks    Description:   Location: right foot  Character: pulling pain    Intensity: mild, moderate, 2/10    Progression of Symptoms: worse    Accompanying Signs & Symptoms:  Other symptoms: none    History:   Previous similar pain: no       Precipitating factors:   Trauma or overuse: YES    Alleviating factors:  Improved by: nothing    Therapies Tried and outcome: Ibuprofen not really helping    Rolled ankle while walking in yard 3 weeks ago.  Seen in urgent care on 19 and was given a brace.  Brace makes pain worse as it rubs on the back of her heel where pain is most prominent.  Walking is painful for her. Pain has worsened since original injury.        Patient Active Problem List   Diagnosis     Varicose veins     Left leg pain     DVT (deep venous thrombosis) (H)     PTSD (post-traumatic stress disorder)     Tobacco use disorder     Venous insufficiency of leg-left     Prediabetes     Thumb injury     Anxiety     Gastroesophageal reflux disease without esophagitis     Galactorrhea of left breast     Generalized hyperhidrosis     Fatigue     CARDIOVASCULAR SCREENING; LDL GOAL LESS THAN 160     Depression with anxiety     Past Surgical History:   Procedure Laterality Date     LAPAROSCOPIC CHOLECYSTECTOMY  10/9/2012    Procedure: LAPAROSCOPIC CHOLECYSTECTOMY;  Laparoscopic Cholecystectomy;  Surgeon: Martell Briscoe MD;  Location: [facility] OR     LAPAROSCOPIC TUBAL LIGATION         Social History     Tobacco Use     Smoking status: Former Smoker     Packs/day: 0.50     Years: 10.00     Pack years: 5.00     Types: Cigarettes     Last attempt to quit: 10/16/2012     Years since quittin.9     Smokeless tobacco: Never Used     Tobacco comment: on e cigs now trying to quit   Substance Use Topics     Alcohol use: Yes     Comment: Maybe once a year     Family History  "  Problem Relation Age of Onset     Diabetes Maternal Grandfather      Other - See Comments Maternal Grandmother         24 yrs old blood clot     Depression Mother      Diabetes Other      C.A.D. No family hx of      Cerebrovascular Disease No family hx of      Hypertension No family hx of      Anesthesia Reaction No family hx of      Arthritis No family hx of      Asthma No family hx of      Breast Cancer No family hx of      Cancer - colorectal No family hx of      Prostate Cancer No family hx of      Thyroid Disease No family hx of      Lipids No family hx of      Congenital Anomalies No family hx of          Current Outpatient Medications   Medication Sig Dispense Refill     ibuprofen (ADVIL/MOTRIN) 800 MG tablet Take 1 tablet (800 mg) by mouth every 8 hours as needed for moderate pain 30 tablet 1     venlafaxine (EFFEXOR-XR) 150 MG 24 hr capsule Take 1 capsule (150 mg) by mouth daily 90 capsule 3     Allergies   Allergen Reactions     Chantix [Varenicline Tartrate] Anxiety         Reviewed and updated as needed this visit by Provider         Review of Systems   ROS COMP: Constitutional, HEENT, cardiovascular, pulmonary, gi and gu systems are negative, except as otherwise noted.      Objective    /86 (BP Location: Left arm, Patient Position: Chair, Cuff Size: Adult Large)   Pulse 91   Temp 97.8  F (36.6  C) (Oral)   Ht 1.683 m (5' 6.25\")   Wt 95.2 kg (209 lb 12.8 oz)   LMP 09/14/2019 (Approximate)   SpO2 100%   BMI 33.61 kg/m    Body mass index is 33.61 kg/m .  Physical Exam   GENERAL: healthy, alert and no distress  MS: right foot, no swelling or ecchymosis noted, markedly tender at distal insertion point of achilles tendon as well as calcaneal insertion of plantar fascia.  +antalgic gait  SKIN: no suspicious lesions or rashes    Diagnostic Test Results:  Xray - final read pending (right foot)        Assessment & Plan     1. Right foot pain  Appears to be related to achilles tendinitis.  Will " "trial treatment as below and per patient instructions.   - XR Ankle Right G/E 3 Views; Future  - [facility], AND CHIROPRACTIC REFERRAL; Future  - PODIATRY/FOOT & ANKLE SURGERY REFERRAL  - diclofenac (VOLTAREN) 75 MG EC tablet; Take 1 tablet (75 mg) by mouth 2 times daily  Dispense: 60 tablet; Refill: 1    2. Achilles tendinitis of right lower extremity  - [facility] HAND, AND CHIROPRACTIC REFERRAL; Future  - PODIATRY/FOOT & ANKLE SURGERY REFERRAL  - diclofenac (VOLTAREN) 75 MG EC tablet; Take 1 tablet (75 mg) by mouth 2 times daily  Dispense: 60 tablet; Refill: 1     BMI:   Estimated body mass index is 33.61 kg/m  as calculated from the following:    Height as of this encounter: 1.683 m (5' 6.25\").    Weight as of this encounter: 95.2 kg (209 lb 12.8 oz).   Weight management plan: Discussed healthy diet and exercise guidelines        Patient Instructions     For what I believe is achilles tendonitis:  Avoid aggravating activities as much as possible.  Take Voltaren (antiinflammatory) twice a day for 14 days.  Ice four times a day for 20 minutes at a time.  See podiatry for further treatment.  I also recommend physical therapy if symptoms do not improve.  Referrals have been entered for both.  Patient Education     Understanding Achilles Tendonitis    Achilles tendonitis is an overuse injury. It results in inflammation of the Achilles tendon. This tendon is found on the back of the ankle. It links the calf muscle to the heel bone. It helps you do pushing-off movements like running or standing on your toes.     How to say it  -PONCHO-nickyz ten-dun-I-tis   What causes Achilles tendonitis?  Achilles tendonitis can happen if you do an activity like running, walking, or jumping too much. This overuse can strain, or pull, the tendon. It may lead to minor tearing of the tendon. An injury to the lower leg or foot can also cause it.  If you don t warm up before taking part in sports such as basketball, you are more likely to " suffer from this condition. You are also more prone to it if you do too much of such an activity too quickly. Proper training and rest can help prevent it.  Symptoms of Achilles tendonitis  The main symptom of Achilles tendonitis is pain. This pain mostly happens when you move the ankle. The tendon may also feel stiff after a period of no activity, such as sleeping. It may also become swollen. You may hear a crackling sound when you move your ankle.  Treatment for Achilles tendonitis  Symptoms often get better after starting treatment. A full recovery may take several months. Treatments include:    Rest. You should stop or change the activity that caused the injury. The tendon will then have time to heal.    Cold or heat pack. These help reduce pain and swelling.    Prescription or over-the-counter pain medicines. These help reduce pain and swelling.    Shoe inserts. These devices can reduce strain on the Achilles tendon when you move. You may then feel less pain.    Stretching and strengthening exercises. Certain exercises can help you regain flexibility and strength in your Achilles tendon.    Surgery. This option can fix the injured tendon. But you don t often need it unless other treatments don t work.     When to call your healthcare provider   Call your healthcare provider right away if you have any of these:    Fever of 100.4 F (38 C) or higher, or as directed    Pain that gets worse    Symptoms that don t get better, or get worse    New symptoms    Date Last Reviewed: 3/10/2016    0636-1545 [employer]. [address] [place] [address]. All rights reserved. This information is not intended as a substitute for professional medical care. Always follow your healthcare professional's instructions.           Patient Education     Understanding Achilles Tendonitis    Achilles tendonitis is an overuse injury. It results in inflammation of the Achilles tendon. This tendon is found on the back  of the ankle. It links the calf muscle to the heel bone. It helps you do pushing-off movements like running or standing on your toes.     How to say it  uh-PONCHO-nickyz ten-dun-I-tena   What causes Achilles tendonitis?  Achilles tendonitis can happen if you do an activity like running, walking, or jumping too much. This overuse can strain, or pull, the tendon. It may lead to minor tearing of the tendon. An injury to the lower leg or foot can also cause it.  If you don t warm up before taking part in sports such as basketball, you are more likely to suffer from this condition. You are also more prone to it if you do too much of such an activity too quickly. Proper training and rest can help prevent it.  Symptoms of Achilles tendonitis  The main symptom of Achilles tendonitis is pain. This pain mostly happens when you move the ankle. The tendon may also feel stiff after a period of no activity, such as sleeping. It may also become swollen. You may hear a crackling sound when you move your ankle.  Treatment for Achilles tendonitis  Symptoms often get better after starting treatment. A full recovery may take several months. Treatments include:    Rest. You should stop or change the activity that caused the injury. The tendon will then have time to heal.    Cold or heat pack. These help reduce pain and swelling.    Prescription or over-the-counter pain medicines. These help reduce pain and swelling.    Shoe inserts. These devices can reduce strain on the Achilles tendon when you move. You may then feel less pain.    Stretching and strengthening exercises. Certain exercises can help you regain flexibility and strength in your Achilles tendon.    Surgery. This option can fix the injured tendon. But you don t often need it unless other treatments don t work.     When to call your healthcare provider   Call your healthcare provider right away if you have any of these:    Fever of 100.4 F (38 C) or higher, or as directed    Pain  that gets worse    Symptoms that don t get better, or get worse    New symptoms    Date Last Reviewed: 3/10/2016    2614-3781 [employer]. [address] [place] [address]. All rights reserved. This information is not intended as a substitute for professional medical care. Always follow your healthcare professional's instructions.           Patient Education     Bent-Knee Calf Stretch    This exercise is designed to stretch and strengthen your feet and ankles. Before beginning the exercise, read through all the instructions. While exercising, breathe normally and don t bounce. If you feel any pain, stop the exercise. If pain persists, inform your healthcare provider:    Stand an arm s length away from a wall. Place the palms of your hands on the wall. Step forward about 12 inches with your ______ foot.    Keeping toes pointed forward and both heels on the floor, bend both knees and lean forward. Hold for ______ seconds. Relax.    Repeat ______ times. Do ______ sets a day.  Date Last Reviewed: 3/1/2018    9099-3133 [employer]. [address] [place] [address]. All rights reserved. This information is not intended as a substitute for professional medical care. Always follow your healthcare professional's instructions.           Patient Education     Foot and Ankle Exercises: Single-Leg Heel Raise    This exercise is designed to stretch and strengthen your feet and ankles. Before beginning the exercise, read through all the instructions. While exercising, breathe normally and don t bounce. If you feel any pain, stop the exercise. If pain persists, inform your healthcare provider. Here are the steps to the single-leg heel raise:    Stand, using a sturdy counter for balance only. Lift 1 foot and stand with your weight on the other foot.    Rise up on your toes, then lower back onto your heel.    Repeat 10 times. Do 3 sets a day.  Date Last Reviewed: 10/1/2017    1181-3418  [employer]. [address] [place] [address], [place] [address]. All rights reserved. This information is not intended as a substitute for professional medical care. Always follow your healthcare professional's instructions.               No follow-ups on file.    MEENAKSHI Gleason [facility]

## 2019-10-04 ASSESSMENT — ANXIETY QUESTIONNAIRES: GAD7 TOTAL SCORE: 12

## 2019-11-06 ENCOUNTER — OFFICE VISIT (OUTPATIENT)
Dept: PODIATRY | Facility: CLINIC | Age: 44
End: 2019-11-06
Payer: COMMERCIAL

## 2019-11-06 VITALS
HEART RATE: 81 BPM | DIASTOLIC BLOOD PRESSURE: 82 MMHG | HEIGHT: 67 IN | BODY MASS INDEX: 33.15 KG/M2 | WEIGHT: 211.2 LBS | SYSTOLIC BLOOD PRESSURE: 136 MMHG

## 2019-11-06 DIAGNOSIS — M77.50 TENDONITIS OF ANKLE: ICD-10-CM

## 2019-11-06 DIAGNOSIS — F41.8 DEPRESSION WITH ANXIETY: ICD-10-CM

## 2019-11-06 DIAGNOSIS — M25.571 PAIN IN JOINT INVOLVING RIGHT ANKLE AND FOOT: Primary | ICD-10-CM

## 2019-11-06 PROCEDURE — 99244 OFF/OP CNSLTJ NEW/EST MOD 40: CPT | Performed by: PODIATRIST

## 2019-11-06 RX ORDER — VENLAFAXINE HYDROCHLORIDE 150 MG/1
150 CAPSULE, EXTENDED RELEASE ORAL DAILY
Qty: 30 CAPSULE | Refills: 0 | Status: SHIPPED | OUTPATIENT
Start: 2019-11-06 | End: 2019-12-10

## 2019-11-06 ASSESSMENT — MIFFLIN-ST. JEOR: SCORE: 1650.63

## 2019-11-06 NOTE — LETTER
November 6, 2019      Roxane Lopes  [address] [place] [id number]              Dear Roxane,      We recently received a refill request from your pharmacy requesting a refill of : Effexor.    A review of your chart indicates that your last office visit was on 10/4/18.  An appointment is required every year with your provider. You are also due for fasting labs. We have authorized a one time 30 day refill of your medication to allow time for you to schedule.     Please call the clinic at [phone], or log in to your [employer] account at [email or web address] to schedule your appointment within that time frame so there is no delay in next month's refill.    Thank you for taking an active role in your healthcare.    Sincerely,        Miguelina Bledsoe CNP    If you need assistance with your [employer] log in, please call [phone].

## 2019-11-06 NOTE — PATIENT INSTRUCTIONS
Thank you for choosing [facility]!    Follow up in 1 month     DR. CASTRO'S CLINIC LOCATIONS     MONDAY  [place] TUESDAY & FRIDAY AM  BIRD   [address] [facility]   [address]   [place] [address] [place] [address]   [phone]  [phone]  [phone]       [facility]EDNESDAY  [facility] [phone]   [address] [place] [address] APPOINTMENTS: [phone]   [place] [address] BILLING QUESTIONS: [phone]   [phone]       WOUND CARE RECOMMENDATIONS    1)  Keep the wound covered by a bandage when bathing.    2)  Gently clean the wound with soap water, separate from bath/shower water.      3)  Each day, apply a topical antibiotic ointment to the wound (Neosporin, Triple antibiotic, Bacitracin).   Cover with large band-aid or gauze.      4)  Please seek immediate medical attention if any increasing redness, swelling, drainage, smell, or pain related to the wound.     5)  Please return to clinic in the period of time requested.    TENDONITIS  Tendons are the strong fibrous portions of muscles that attach to bones and allow the muscle to move a joint when it contracts. Tendons are very strong because they have a lot of force exerted on them. Sometimes tendons can become painful because they have suffered an acute injury, in which too much force was exerted at one time, or an overuse injury, in which a normal force was exerted too frequently or over a prolonged period of time. As a result, there is damage to the tendon and its surrounding soft tissue structures and they become inflamed. Because tendons do not have a great blood supply, they do not heal rapidly and the inflammation can become chronic.   Conservative treatment for tendinitis involves rest and anti-inflammatory measures. Ice is applied 15 minutes 2-3 times daily. Anti-inflammatory medications called NSAIDs (ibuprofen, example) can be taken provided  they are used with caution, as they can lead to internal bleeding and increase the risk of stroke and heart attack. Often your doctor will use a special shoe or removable walking cast to immobilize the tendon, allowing it to heal without further damage from use. These devices are very useful in helping tendons heal, but they may slow you down or make you feel like your hip, knee, or back are out of alignment. This is temporary and should go away once you are out of the immobilization. You should not use a walking cast when showering or driving.   If conservative measures fail, your physician may need to surgically repair the tendon by removing any chronic inflammatory tissue and sewing it back together. Sometimes it is sewn to an adjacent tendon with similar function for support and sometimes it is lengthened. Sometimes the bones around the tendon need to be realigned or reshaped to better support the tendon or prevent further damage. Your foot and ankle surgeon will discuss the specifics of your surgery with you, should you need it.    PRICE THERAPY  Many aches and pains throughout the foot and ankle can be helped with many simple treatments. This is usually described as PRICE Therapy.      P - Protection - often times, inflammation/pain in the lower extremity is not able to improve simply because the areas involved are never allowed to rest. Every step we take can bother the problematic area. Protecting those areas is an important step in the healing process. This may involve a walking cast boot, a special insert/orthotic device, an ankle brace, or simply avoiding barefoot walking.    R - Rest - in addition to protecting the foot/ankle, resting is an important, but often times difficult, treatment option. Getting off your feet when they bother you, and specifically avoiding activities that cause pain/discomfort, are very beneficial to prevent, and treat, foot/ankle pain.      I - Ice - icing regularly can help to  decrease inflammation and swelling in the foot, thus decreasing pain. Using an ice pack or a bag of frozen veggies works very well. Ice for 20 minutes multiple times per day as needed.  Do not place the ice directly on the skin as this can cause tissue damage.    C - Compression - using a compression wrap or an ACE wrap can help to decrease swelling, which can help to decrease pain. Wearing the wraps is generally not needed at night, but they should be worn on a regular basis when you are going to be on your feet for prolonged periods as gravity tends to pull fluids down to your feet/ankles.    E - Elevation - elevating your lower extremities multiple times daily for 15-20 minutes can help to decrease swelling, which works well in decreasing pain levels.    NSAID/Tylenol - Anti-inflammatories like Aleve or ibuprofen, and/or a pain medication, such as Tylenol, can help to improve pain levels and get the issue resolved sooner rather than later. Anyone with liver issues should be careful with Tylenol, and anyone with high blood pressure or heart, stomach or kidney issues should be careful with anti-inflammatories. Please ask if you have questions about these medications, including dosage.    BLOOD CLOT PREVENTION - WEARING A CAST BOOT    Do not drive with the CAM boot  Do not wear during long-distance travel: long car rides / plane trips  Wear the boot when moving, regardless of your weight-bearing status    Any brace that extends up to the knee can increase your chance of developing a blood clot. Blood clots generally occur in the large veins of your calf, thigh, or abdomen. A blood clot may form when blood is stagnant in the veins while your leg is immobilized in the brace. Dhara and relaxing the calf muscles by moving the ankle is the best method to avoid stagnant blood. Clarify with your doctor if you should be doing this as this may not be recommended for certain tendon surgeries.    Blood clots or deep  venous thrombosis (DVT) can be life threatening if a portion of the clot breaks away and travels through the veins to your lungs. This is called a pulmonary embolism (PE) which can result in death.    Symptoms of a DVT may include swelling, tenderness, a warm feeling or redness in the leg. DVT can occur in both legs, even if only one side is in a brace. If you have these symptoms, you should call your doctor immediately or go to the Emergency Room to have your leg scanned.     Symptoms of a pulmonary embolism (PE) include chest pain, shortness of breath or the need to breath rapidly that is not associated with exercise, difficulty breathing, rapid heart rate, coughing or coughing up blood, or a feeling of passing out. Chest pain can range from mild to knife-like pain while taking a deep breath. Pulmonary embolism can occur without any symptoms whatsoever. You need to be evaluated in a hospital emergency room immediately if you have symptoms of a PE. Please call 911 as PE is a life-threatening emergency.    Be certain that you understand how much ankle range of motion is allowed. Your foot and ankle problem is being immobilized by the brace, yet we do not want you to develop a blood clot. The velcro strap braces are intended to be removed for periodic exercise of the ankle. Your doctor will tell you if it is okay to do this depending on the type of surgery or injury you had.    Your own personal risk of developing a DVT or PE is dependent on many factors. A personal or family history of previous blood clot or a clotting disorder (such as thrombophilia) puts you at risk. Other risk factors include history of cancer, current use of estrogen medications (such as birth control pills or post menopausal medications), recent trauma or fracture, diabetes, recent heart attack, COPD, heart failure, pregnancy, obesity, advanced age, smoking, etc. Please make sure your doctor is aware if you have any of these risk  factors.    [facility] SCHEDULING  They should be calling you within 24 hours to schedule your scan.  If not, please call the location discussed at your appointment.    1) [facility]:       [phone]      [address] [place] [address]    2) [facility]:      [phone]      [address] [place] [address]    3) [facility]:       [phone]      [address] [place] [address]    * We will call you with the results. Please allow 24-48 hours for the results to be read and final.            BODY WEIGHT AND YOUR FEET  The following information is included in the after visit summary for all patients. Body weight can be a sensitive issue to discuss in clinic, but we think the following information is very important. Although we focus on the feet and ankles, we do support the overall health of our patients.     Many things can cause foot and ankle problems. Foot structure, activity level, foot mechanics and injuries are common causes of pain. One very important issue that often goes unmentioned, is body weight. Extra weight can cause increased stress on muscles, ligaments, bones and tendons. Sometimes just a few extra pounds is all it takes to put one over her/his threshold. Without reducing that stress, it can be difficult to alleviate pain. As Foot & Ankle specialists, our job is addressing the lower extremity problem and possible causes. Regarding extra body weight, we encourage patients to discuss diet and weight management plans with their primary care doctors. It is this team approach that gives you the best opportunity for pain relief and getting you back on your feet.      [facility] has a Comprehensive Weight Management Program. This program includes counseling, education, non-surgical and surgical approaches to weight loss. If you are interested in learning more either talk to you primary care provider or call  [phone].

## 2019-11-06 NOTE — PROGRESS NOTES
"PATIENT HISTORY:  Roxane Lopes is a 44 year old female who presents to clinic for R foot and ankle pain since an injury 2 months ago.  Pt reports letting her dog out and \"stepping down\" on uneven terrain, causing the injury.  She doesn't think she twisted her ankle however.  Pain is primarily in the ankle area.  XRs were negative.  She was trying home ankle exercises and this was making the pain worse.  Better with rest.  Worse with activity.  She was given a soft brace which also made the pain worse.  She is taking Voltaren prn.      I was requested to see this patient for this issue by Estela Salas CNP.    Review of Systems:  Patient denies fever, chills, rash, wound, limping, numbness, heart burn, blood in stool, chest pain with activity, shortness of breath with activity, chronic cough, easy bleeding/bruising, excessive thirst, fatigue, depression, anxiety.  Patient admits to stiffness, weakness, calf pain when walking, swelling of ankle.     PAST MEDICAL HISTORY:   Past Medical History:   Diagnosis Date     Depression      DVT (deep venous thrombosis) (H) 4/2011    left leg, was on lovenox and coumadin but have been off since November 2011     H/O tubal ligation     2006      Prediabetes         PAST SURGICAL HISTORY:   Past Surgical History:   Procedure Laterality Date     LAPAROSCOPIC CHOLECYSTECTOMY  10/9/2012    Procedure: LAPAROSCOPIC CHOLECYSTECTOMY;  Laparoscopic Cholecystectomy;  Surgeon: Martell Briscoe MD;  Location: [facility] OR     LAPAROSCOPIC TUBAL LIGATION          MEDICATIONS:   Current Outpatient Medications:      order for DME, Medium short Aircast boot, Disp: 1 Device, Rfl: 0     diclofenac (VOLTAREN) 75 MG EC tablet, Take 1 tablet (75 mg) by mouth 2 times daily, Disp: 60 tablet, Rfl: 1     ibuprofen (ADVIL/MOTRIN) 800 MG tablet, Take 1 tablet (800 mg) by mouth every 8 hours as needed for moderate pain, Disp: 30 tablet, Rfl: 1     venlafaxine (EFFEXOR-XR) 150 MG 24 hr capsule, Take 1 capsule " (150 mg) by mouth daily, Disp: 90 capsule, Rfl: 3     ALLERGIES:    Allergies   Allergen Reactions     Chantix [Varenicline Tartrate] Anxiety        SOCIAL HISTORY:   Social History     Socioeconomic History     Marital status: Single     Spouse name: Not on file     Number of children: Not on file     Years of education: Not on file     Highest education level: Not on file   Occupational History     Not on file   Social Needs     Financial resource strain: Not on file     Food insecurity:     Worry: Not on file     Inability: Not on file     Transportation needs:     Medical: Not on file     Non-medical: Not on file   Tobacco Use     Smoking status: Former Smoker     Packs/day: 0.50     Years: 10.00     Pack years: 5.00     Types: Cigarettes     Last attempt to quit: 10/16/2012     Years since quittin.0     Smokeless tobacco: Never Used     Tobacco comment: on e cigs now trying to quit   Substance and Sexual Activity     Alcohol use: Yes     Comment: Maybe once a year     Drug use: No     Sexual activity: Yes     Partners: Male     Birth control/protection: Surgical     Comment:  one partner   Lifestyle     Physical activity:     Days per week: Not on file     Minutes per session: Not on file     Stress: Not on file   Relationships     Social connections:     Talks on phone: Not on file     Gets together: Not on file     Attends Jew service: Not on file     Active member of club or organization: Not on file     Attends meetings of clubs or organizations: Not on file     Relationship status: Not on file     Intimate partner violence:     Fear of current or ex partner: Not on file     Emotionally abused: Not on file     Physically abused: Not on file     Forced sexual activity: Not on file   Other Topics Concern     Parent/sibling w/ CABG, MI or angioplasty before 65F 55M? No   Social History Narrative     Not on file        FAMILY HISTORY:   Family History   Problem Relation Age of Onset      "Diabetes Maternal Grandfather      Other - See Comments Maternal Grandmother         24 yrs old blood clot     Depression Mother      Diabetes Other      C.A.D. No family hx of      Cerebrovascular Disease No family hx of      Hypertension No family hx of      Anesthesia Reaction No family hx of      Arthritis No family hx of      Asthma No family hx of      Breast Cancer No family hx of      Cancer - colorectal No family hx of      Prostate Cancer No family hx of      Thyroid Disease No family hx of      Lipids No family hx of      Congenital Anomalies No family hx of         EXAM:Vitals: /82   Pulse 81   Ht 1.702 m (5' 7\")   Wt 95.8 kg (211 lb 3.2 oz)   BMI 33.08 kg/m    BMI= Body mass index is 33.08 kg/m .    General appearance: Patient is alert and fully cooperative with history & exam.  No sign of distress is noted during the visit.     Psychiatric: Affect is pleasant & appropriate.  Patient appears motivated to improve health.     Respiratory: Breathing is regular & unlabored while sitting.     HEENT: Hearing is intact to spoken word.  Speech is clear.  No gross evidence of visual impairment that would impact ambulation.     Dermatologic: Skin is intact to both lower extremities without significant lesions, rash or abrasion.  No paronychia or evidence of soft tissue infection is noted.     Vascular: DP & PT pulses are intact & regular bilaterally.  No significant edema or varicosities noted.  CFT and skin temperature are normal to both lower extremities.     Neurologic: Lower extremity sensation is intact to light touch.  No evidence of weakness or contracture in the lower extremities.  No evidence of neuropathy.     Musculoskeletal: Diffuse R medial and lateral ankle pain.  Pain over medial and lateral malleoli, course of posterior tibial tendon, peroneal tendons, ATFL.  Pain with ROM of the ankle which seems normal.  No proximal leg pain.  No Achilles pain.  Pain also radiates into rearfoot area.  " No R calf tenderness, swelling, redness.  Patient is ambulatory without assistive device or brace.  No gross ankle deformity noted.  No foot or ankle joint effusion is noted.    XRs of R ankle reviewed with pt.  No acute findings.     ASSESSMENT: R foot and ankle pain, recent injury     PLAN:  Reviewed patient's chart in epic.  Discussed condition and treatment options including pros and cons.    Etiology not entirely clear.  Tendonitis possible.  Discussed possible occult fx.  Advised short Aircast boot.  WBAT.  RICE.  Will send for MRI.  F/u after scan.    Hx of DVT noted.    Pt advised to remove boot several times a day and do ankle ROM exercises/wiggle toes. It is also recommended that a thick-soled shoe be worn on the contralateral foot to offset any created leg length issue. CAM does not have to be worn at night.    We discussed immobilization and the risk of blood clot. ROM exercises and knee-high compression recommended. Patient to seek medical attention if calf swelling and/or pain, chest pain, shortness of breath.  81mg ASA bid advised.    Wenceslao Townsend DPM, FACFAS    Weight management plan: Patient was referred to their PCP to discuss a diet and exercise plan.

## 2019-11-06 NOTE — TELEPHONE ENCOUNTER
effexor   Last Written Prescription Date:  10/4/18  Last Fill Quantity: 90,  # refills: 3   Last office visit: 10/4/2018 with prescribing provider:  Adelaide Bledsoe   Future Office Visit:

## 2019-11-06 NOTE — LETTER
"    11/6/2019         RE: Roxane Lopes  [address] [place] [id number]        Dear Colleague,    Thank you for referring your patient, Roxane Lopes, to the [facility]. Please see a copy of my visit note below.    PATIENT HISTORY:  Roxane Lopes is a 44 year old female who presents to clinic for R foot and ankle pain since an injury 2 months ago.  Pt reports letting her dog out and \"stepping down\" on uneven terrain, causing the injury.  She doesn't think she twisted her ankle however.  Pain is primarily in the ankle area.  XRs were negative.  She was trying home ankle exercises and this was making the pain worse.  Better with rest.  Worse with activity.  She was given a soft brace which also made the pain worse.  She is taking Voltaren prn.      I was requested to see this patient for this issue by Estela Salas CNP.    Review of Systems:  Patient denies fever, chills, rash, wound, limping, numbness, heart burn, blood in stool, chest pain with activity, shortness of breath with activity, chronic cough, easy bleeding/bruising, excessive thirst, fatigue, depression, anxiety.  Patient admits to stiffness, weakness, calf pain when walking, swelling of ankle.     PAST MEDICAL HISTORY:   Past Medical History:   Diagnosis Date     Depression      DVT (deep venous thrombosis) (H) 4/2011    left leg, was on lovenox and coumadin but have been off since November 2011     H/O tubal ligation     2006      Prediabetes         PAST SURGICAL HISTORY:   Past Surgical History:   Procedure Laterality Date     LAPAROSCOPIC CHOLECYSTECTOMY  10/9/2012    Procedure: LAPAROSCOPIC CHOLECYSTECTOMY;  Laparoscopic Cholecystectomy;  Surgeon: Martell Briscoe MD;  Location: [facility] OR     LAPAROSCOPIC TUBAL LIGATION          MEDICATIONS:   Current Outpatient Medications:      order for DME, Medium short Aircast boot, Disp: 1 Device, Rfl: 0     diclofenac (VOLTAREN) 75 MG EC tablet, Take 1 tablet (75 mg) by mouth " 2 times daily, Disp: 60 tablet, Rfl: 1     ibuprofen (ADVIL/MOTRIN) 800 MG tablet, Take 1 tablet (800 mg) by mouth every 8 hours as needed for moderate pain, Disp: 30 tablet, Rfl: 1     venlafaxine (EFFEXOR-XR) 150 MG 24 hr capsule, Take 1 capsule (150 mg) by mouth daily, Disp: 90 capsule, Rfl: 3     ALLERGIES:    Allergies   Allergen Reactions     Chantix [Varenicline Tartrate] Anxiety        SOCIAL HISTORY:   Social History     Socioeconomic History     Marital status: Single     Spouse name: Not on file     Number of children: Not on file     Years of education: Not on file     Highest education level: Not on file   Occupational History     Not on file   Social Needs     Financial resource strain: Not on file     Food insecurity:     Worry: Not on file     Inability: Not on file     Transportation needs:     Medical: Not on file     Non-medical: Not on file   Tobacco Use     Smoking status: Former Smoker     Packs/day: 0.50     Years: 10.00     Pack years: 5.00     Types: Cigarettes     Last attempt to quit: 10/16/2012     Years since quittin.0     Smokeless tobacco: Never Used     Tobacco comment: on e cigs now trying to quit   Substance and Sexual Activity     Alcohol use: Yes     Comment: Maybe once a year     Drug use: No     Sexual activity: Yes     Partners: Male     Birth control/protection: Surgical     Comment:  one partner   Lifestyle     Physical activity:     Days per week: Not on file     Minutes per session: Not on file     Stress: Not on file   Relationships     Social connections:     Talks on phone: Not on file     Gets together: Not on file     Attends [employer] service: Not on file     Active member of club or organization: Not on file     Attends meetings of clubs or organizations: Not on file     Relationship status: Not on file     Intimate partner violence:     Fear of current or ex partner: Not on file     Emotionally abused: Not on file     Physically abused: Not on file      "Forced sexual activity: Not on file   Other Topics Concern     Parent/sibling w/ CABG, MI or angioplasty before 65F 55M? No   Social History Narrative     Not on file        FAMILY HISTORY:   Family History   Problem Relation Age of Onset     Diabetes Maternal Grandfather      Other - See Comments Maternal Grandmother         24 yrs old blood clot     Depression Mother      Diabetes Other      C.A.D. No family hx of      Cerebrovascular Disease No family hx of      Hypertension No family hx of      Anesthesia Reaction No family hx of      Arthritis No family hx of      Asthma No family hx of      Breast Cancer No family hx of      Cancer - colorectal No family hx of      Prostate Cancer No family hx of      Thyroid Disease No family hx of      Lipids No family hx of      Congenital Anomalies No family hx of         EXAM:Vitals: /82   Pulse 81   Ht 1.702 m (5' 7\")   Wt 95.8 kg (211 lb 3.2 oz)   BMI 33.08 kg/m     BMI= Body mass index is 33.08 kg/m .    General appearance: Patient is alert and fully cooperative with history & exam.  No sign of distress is noted during the visit.     Psychiatric: Affect is pleasant & appropriate.  Patient appears motivated to improve health.     Respiratory: Breathing is regular & unlabored while sitting.     HEENT: Hearing is intact to spoken word.  Speech is clear.  No gross evidence of visual impairment that would impact ambulation.     Dermatologic: Skin is intact to both lower extremities without significant lesions, rash or abrasion.  No paronychia or evidence of soft tissue infection is noted.     Vascular: DP & PT pulses are intact & regular bilaterally.  No significant edema or varicosities noted.  CFT and skin temperature are normal to both lower extremities.     Neurologic: Lower extremity sensation is intact to light touch.  No evidence of weakness or contracture in the lower extremities.  No evidence of neuropathy.     Musculoskeletal: Diffuse R medial and " lateral ankle pain.  Pain over medial and lateral malleoli, course of posterior tibial tendon, peroneal tendons, ATFL.  Pain with ROM of the ankle which seems normal.  No proximal leg pain.  No Achilles pain.  Pain also radiates into rearfoot area.  No R calf tenderness, swelling, redness.  Patient is ambulatory without assistive device or brace.  No gross ankle deformity noted.  No foot or ankle joint effusion is noted.    XRs of R ankle reviewed with pt.  No acute findings.     ASSESSMENT: R foot and ankle pain, recent injury     PLAN:  Reviewed patient's chart in epic.  Discussed condition and treatment options including pros and cons.    Etiology not entirely clear.  Tendonitis possible.  Discussed possible occult fx.  Advised short Aircast boot.  WBAT.  RICE.  Will send for MRI.  F/u after scan.    Hx of DVT noted.    Pt advised to remove boot several times a day and do ankle ROM exercises/wiggle toes. It is also recommended that a thick-soled shoe be worn on the contralateral foot to offset any created leg length issue. CAM does not have to be worn at night.    We discussed immobilization and the risk of blood clot. ROM exercises and knee-high compression recommended. Patient to seek medical attention if calf swelling and/or pain, chest pain, shortness of breath.  81mg ASA bid advised.    Wenceslao Townsend DPM, FACFAS    Weight management plan: Patient was referred to their PCP to discuss a diet and exercise plan.      Again, thank you for allowing me to participate in the care of your patient.        Sincerely,        Wenceslao Townsend DPM

## 2019-11-08 ENCOUNTER — TELEPHONE (OUTPATIENT)
Dept: PODIATRY | Facility: CLINIC | Age: 44
End: 2019-11-08

## 2019-11-08 NOTE — TELEPHONE ENCOUNTER
Patient left voicemail stating she couldn't get to her phone. She is having more intense pain and would like a stronger medication.   She can be reached at: [phone]  Ok to leave message : Yes    LAURA Oneal RN

## 2019-11-08 NOTE — TELEPHONE ENCOUNTER
No consent to communicate on file.   Left message asking for a return call. Triage number provided.     LAURA Oneal RN

## 2019-11-08 NOTE — TELEPHONE ENCOUNTER
Spoke to patient and went over recommendations from Dr. Townsend. Noted understanding and had no further questions.    Shaneka Jackman ATC

## 2019-11-08 NOTE — TELEPHONE ENCOUNTER
Received staff message:      Shaneka Lopez Matthew C, DPM             Patient experiencing more pain and looking for a different medication, please call patient with rx concern.  Thank You.        Please call pt.  She has Rx for Voltaren.  Pt can try extra strength Tylenol.  I will not be prescribing any narcotic medication.  She is in a boot and has MRI pending.  PRICE.    Thanks.

## 2019-11-18 ENCOUNTER — ANCILLARY PROCEDURE (OUTPATIENT)
Dept: MRI IMAGING | Facility: CLINIC | Age: 44
End: 2019-11-18
Attending: PODIATRIST
Payer: COMMERCIAL

## 2019-11-18 DIAGNOSIS — M25.571 PAIN IN JOINT INVOLVING RIGHT ANKLE AND FOOT: ICD-10-CM

## 2019-11-18 DIAGNOSIS — M77.50 TENDONITIS OF ANKLE: ICD-10-CM

## 2019-11-18 PROCEDURE — 73721 MRI JNT OF LWR EXTRE W/O DYE: CPT | Mod: RT | Performed by: RADIOLOGY

## 2019-11-19 ENCOUNTER — TELEPHONE (OUTPATIENT)
Dept: PODIATRY | Facility: CLINIC | Age: 44
End: 2019-11-19

## 2019-11-19 NOTE — TELEPHONE ENCOUNTER
----- Message from Wenceslao Townsend DPM sent at 11/18/2019  1:20 PM CST -----  Please call pt.  MR shows a subtle fracture of the cuboid bone and some tendon damage in the area.  Also some tearing of the plantar fascia noted.  I would recommend continuing in the CAM boot.  She should reduce weight bearing as much as possible.  I will plan to offer crutches/rollabout at follow up.  RAIN.  I would like her to follow up at [place] this Wednesday if she is able.  I have held a slot at 0830.  Thanks.

## 2019-11-19 NOTE — TELEPHONE ENCOUNTER
Spoke to patient, she is able to make the appointment tomorrow in [place]. Went over results with her and will discuss with provider tomorrow. Appointment scheduled.    Shaneka Jackman ATC

## 2019-11-20 ENCOUNTER — TELEPHONE (OUTPATIENT)
Dept: PODIATRY | Facility: CLINIC | Age: 44
End: 2019-11-20

## 2019-11-20 NOTE — TELEPHONE ENCOUNTER
Pt was scheduled to see me today to discuss MRI results.  It appears she cancelled her visit.  She is aware of the MRI results.    I want to make sure she knows I'm recommending no weightbearing on her injured foot due to concern for fracture.  This should also help her pain.      I was planning to get her crutches and order a rollabout today.    She can obtain crutches elsewhere or come in to urgent care.    I would like her to follow up sooner than 12/11 if she is able.      Please call to inform her.

## 2019-11-20 NOTE — TELEPHONE ENCOUNTER
LVM for patient, requested she call back to discuss. Reiterated that Dr. Townsend didn't want her WB due to concern for fx and he wants to see her earlier than her scheduled appt.     Left triage number to discuss.  Will also reach via teofilo Jackman ATC

## 2019-11-22 NOTE — TELEPHONE ENCOUNTER
Phone call to patient. She states she called several times and was told she couldn't get in any sooner with Dr. Townsend. It was not offered to her for crutches or a rollabout. She has been wearing her boot but bearing weight. She is having a lot of pain and was frustrated with not being able to get in sooner. She works at [employer] so will obtain crutches there. Appointment scheduled for 11/25/19 with Dr. Townsend at [place].   She will discuss rollabout at appointment on Monday. She verbalized understanding and was appreciative of return call.   LAURA Oneal RN

## 2019-11-25 ENCOUNTER — OFFICE VISIT (OUTPATIENT)
Dept: PODIATRY | Facility: CLINIC | Age: 44
End: 2019-11-25
Payer: COMMERCIAL

## 2019-11-25 ENCOUNTER — ANCILLARY PROCEDURE (OUTPATIENT)
Dept: GENERAL RADIOLOGY | Facility: CLINIC | Age: 44
End: 2019-11-25
Attending: PODIATRIST
Payer: COMMERCIAL

## 2019-11-25 VITALS
HEIGHT: 67 IN | WEIGHT: 210 LBS | HEART RATE: 76 BPM | BODY MASS INDEX: 32.96 KG/M2 | DIASTOLIC BLOOD PRESSURE: 80 MMHG | SYSTOLIC BLOOD PRESSURE: 130 MMHG

## 2019-11-25 DIAGNOSIS — S93.691A TRAUMATIC RUPTURE OF PLANTAR FASCIA OF RIGHT FOOT, INITIAL ENCOUNTER: ICD-10-CM

## 2019-11-25 DIAGNOSIS — S86.311A TEAR OF PERONEAL TENDON, RIGHT, INITIAL ENCOUNTER: ICD-10-CM

## 2019-11-25 DIAGNOSIS — S92.214A CLOSED NONDISPLACED FRACTURE OF CUBOID OF RIGHT FOOT, INITIAL ENCOUNTER: ICD-10-CM

## 2019-11-25 DIAGNOSIS — S92.214A CLOSED NONDISPLACED FRACTURE OF CUBOID OF RIGHT FOOT, INITIAL ENCOUNTER: Primary | ICD-10-CM

## 2019-11-25 PROCEDURE — 73630 X-RAY EXAM OF FOOT: CPT | Mod: RT

## 2019-11-25 PROCEDURE — 99214 OFFICE O/P EST MOD 30 MIN: CPT | Performed by: PODIATRIST

## 2019-11-25 ASSESSMENT — MIFFLIN-ST. JEOR: SCORE: 1645.18

## 2019-11-25 NOTE — PATIENT INSTRUCTIONS
No weight bearing right foot in CAM boot      Thank you for choosing [facility]!    Follow up in 4 weeks    DR. CASTRO'S CLINIC LOCATIONS     MONDAY  [place] TUESDAY & FRIDAY AM  BIRD   [address]   [address]   [place] [address] [place] [address]   [phone]  [phone]  [phone]       WEDNESDAY  [place] SCHEDULE SURGERY: [phone]   [address] [facility] APPOINTMENTS: [phone]   [place] [address] BILLING QUESTIONS: [phone]   [phone]       PRICE THERAPY  Many aches and pains throughout the foot and ankle can be helped with many simple treatments. This is usually described as PRICE Therapy.      P - Protection - often times, inflammation/pain in the lower extremity is not able to improve simply because the areas involved are never allowed to rest. Every step we take can bother the problematic area. Protecting those areas is an important step in the healing process. This may involve a walking cast boot, a special insert/orthotic device, an ankle brace, or simply avoiding barefoot walking.    R - Rest - in addition to protecting the foot/ankle, resting is an important, but often times difficult, treatment option. Getting off your feet when they bother you, and specifically avoiding activities that cause pain/discomfort, are very beneficial to prevent, and treat, foot/ankle pain.      I - Ice - icing regularly can help to decrease inflammation and swelling in the foot, thus decreasing pain. Using an ice pack or a bag of frozen veggies works very well. Ice for 20 minutes multiple times per day as needed.  Do not place the ice directly on the skin as this can cause tissue damage.    C - Compression - using a compression wrap or an ACE wrap can help to decrease swelling, which can help to decrease pain. Wearing the wraps is generally not needed at night, but they should be worn on a  regular basis when you are going to be on your feet for prolonged periods as gravity tends to pull fluids down to your feet/ankles.    E - Elevation - elevating your lower extremities multiple times daily for 15-20 minutes can help to decrease swelling, which works well in decreasing pain levels.    NSAID/Tylenol - Anti-inflammatories like Aleve or ibuprofen, and/or a pain medication, such as Tylenol, can help to improve pain levels and get the issue resolved sooner rather than later. Anyone with liver issues should be careful with Tylenol, and anyone with high blood pressure or heart, stomach or kidney issues should be careful with anti-inflammatories. Please ask if you have questions about these medications, including dosage.    BLOOD CLOT PREVENTION - WEARING A CAST BOOT    Do not drive with the CAM boot  Do not wear during long-distance travel: long car rides / plane trips  Wear the boot when moving, regardless of your weight-bearing status    Any brace that extends up to the knee can increase your chance of developing a blood clot. Blood clots generally occur in the large veins of your calf, thigh, or abdomen. A blood clot may form when blood is stagnant in the veins while your leg is immobilized in the brace. Dhara and relaxing the calf muscles by moving the ankle is the best method to avoid stagnant blood. Clarify with your doctor if you should be doing this as this may not be recommended for certain tendon surgeries.    Blood clots or deep venous thrombosis (DVT) can be life threatening if a portion of the clot breaks away and travels through the veins to your lungs. This is called a pulmonary embolism (PE) which can result in death.    Symptoms of a DVT may include swelling, tenderness, a warm feeling or redness in the leg. DVT can occur in both legs, even if only one side is in a brace. If you have these symptoms, you should call your doctor immediately or go to the Emergency Room to have your leg  scanned.     Symptoms of a pulmonary embolism (PE) include chest pain, shortness of breath or the need to breath rapidly that is not associated with exercise, difficulty breathing, rapid heart rate, coughing or coughing up blood, or a feeling of passing out. Chest pain can range from mild to knife-like pain while taking a deep breath. Pulmonary embolism can occur without any symptoms whatsoever. You need to be evaluated in a hospital emergency room immediately if you have symptoms of a PE. Please call 911 as PE is a life-threatening emergency.    Be certain that you understand how much ankle range of motion is allowed. Your foot and ankle problem is being immobilized by the brace, yet we do not want you to develop a blood clot. The velcro strap braces are intended to be removed for periodic exercise of the ankle. Your doctor will tell you if it is okay to do this depending on the type of surgery or injury you had.    Your own personal risk of developing a DVT or PE is dependent on many factors. A personal or family history of previous blood clot or a clotting disorder (such as thrombophilia) puts you at risk. Other risk factors include history of cancer, current use of estrogen medications (such as birth control pills or post menopausal medications), recent trauma or fracture, diabetes, recent heart attack, COPD, heart failure, pregnancy, obesity, advanced age, smoking, etc. Please make sure your doctor is aware if you have any of these risk factors.    [facility]  [place]  [address] [place] [address] [address]   [place] [address]  Ph: [phone]  Fax: [phone] [facility]  [address]  [place] [address]  Ph: [phone]  Fax: [phone]   [place]  [address] [facility] [address]   [place] [address]  Ph: [phone]  Fax: [phone] [place]  [address]   [place] [address]  Ph: [phone]  Fax: [phone]     *OTHER STORE OPTIONS OUTSIDE OF  [place]*  [facility]   [address]  [place] [address]  [phone] [facility]  [address] [place] [address]  [phone] [facility]  [address] [address]  [phone]           BODY WEIGHT AND YOUR FEET  The following information is included in the after visit summary for all patients. Body weight can be a sensitive issue to discuss in clinic, but we think the following information is very important. Although we focus on the feet and ankles, we do support the overall health of our patients.     Many things can cause foot and ankle problems. Foot structure, activity level, foot mechanics and injuries are common causes of pain. One very important issue that often goes unmentioned, is body weight. Extra weight can cause increased stress on muscles, ligaments, bones and tendons. Sometimes just a few extra pounds is all it takes to put one over her/his threshold. Without reducing that stress, it can be difficult to alleviate pain. As Foot & Ankle specialists, our job is addressing the lower extremity problem and possible causes. Regarding extra body weight, we encourage patients to discuss diet and weight management plans with their primary care doctors. It is this team approach that gives you the best opportunity for pain relief and getting you back on your feet.      [facility] has a Comprehensive Weight Management Program. This program includes counseling, education, non-surgical and surgical approaches to weight loss. If you are interested in learning more either talk to you primary care provider or call [phone].

## 2019-11-25 NOTE — LETTER
"    11/25/2019         RE: Roxane Lopes  [address] [place] [phone]        Dear Colleague,    Thank you for referring your patient, Roxane Lopes, to the [facility]. Please see a copy of my visit note below.    PATIENT HISTORY:  Roxane Lopes is a 44 year old female who presents to clinic for follow up on R foot, MRI results.  Injury almost 3 months ago.  Pt reports letting her dog out and \"stepping down\" on uneven terrain, causing the injury.  She  is in a boot, presents walking on foot.  pt states she has crutches at home, has trouble using them. Better with rest.  Worse with activity.   no fevers, chills, new injury.  Nonsmoker.  Works as a pharm tech.  Nondiabetic.  Denies related family hx.     EXAM:Vitals: /80   Pulse 76   Ht 1.702 m (5' 7\")   Wt 95.3 kg (210 lb)   BMI 32.89 kg/m     BMI= Body mass index is 32.89 kg/m .    General appearance: Patient is alert and fully cooperative with history & exam.  No sign of distress is noted during the visit.     Dermatologic: Skin is intact to R foot without significant lesions, rash or abrasion.  No paronychia or evidence of soft tissue infection is noted.     Vascular: DP & PT pulses are intact & regular on the R.  No significant edema or varicosities noted.  CFT and skin temperature are normal.     Neurologic: Lower extremity sensation is intact to light touch.  No evidence of weakness or contracture in the lower extremities.  No evidence of neuropathy.     Musculoskeletal: R foot pain along course of peroneal tendons, at cuboid, also to plantar heel.  Patient is ambulatory with a boot.  No gross ankle deformity noted.  No foot or ankle joint effusion is noted.  No calf pain, redness, or swelling.    MR reviewed with pt:    Impression:  1.  Focal bone marrow edema involving the plantar cuboid with subtle  fracture line likely representing impaction fracture vs. insufficiency  fracture, which may be related to adjacent " peroneal tendon pathology.  2.  Tendinosis and tenosynovitis involving the peroneus longus at the  level of the cuboid with possible partial tearing of the peroneus  longus   3.  Plantar fasciitis and partial tearing involving the proximal  aspect of the central cord of the plantar fascia with associated bone  marrow edema of the calcaneus.     I have personally reviewed the examination and initial interpretation  and I agree with the findings.     AKHIL GURROLA    Plain XRs of R foot also taken today, reviewed with pt.  Difficult to see any cuboid fx on plain film.     ASSESSMENT:   R foot cuboid fx  R peroneus longus partial tear  R plantar fascia tear     PLAN:  Reviewed patient's chart in epic.  Discussed condition and treatment options including pros and cons.    Pt with multiple soft tissue and bone injuries, including cuboid impaction fx, partial tear of peroneus longus, plantar fascia tear.  Discussed nonoperative treatment would be reasonable at this time.  RICE.  NWB in boot advised.  Will write order for rollabout.  F/u in 4 wks.     Hx of DVT noted.     We discussed immobilization and the risk of blood clot. Leg raises, knee bends several times/day out of boot and compression recommended. Patient to seek medical attention if calf swelling and/or pain, chest pain, shortness of breath.  81mg ASA bid advised.    Wenceslao Townsend DPM, FACFAS    Weight management plan: Patient was referred to their PCP to discuss a diet and exercise plan.        Again, thank you for allowing me to participate in the care of your patient.        Sincerely,        Wenceslao Townsend DPM

## 2019-11-25 NOTE — PROGRESS NOTES
"PATIENT HISTORY:  Roxane Lopes is a 44 year old female who presents to clinic for follow up on R foot, MRI results.  Injury almost 3 months ago.  Pt reports letting her dog out and \"stepping down\" on uneven terrain, causing the injury.  She is in a boot, presents walking on foot.  pt states she has crutches at home, has trouble using them. Better with rest.  Worse with activity.  no fevers, chills, new injury.  Nonsmoker.  Works as a pharm tech.  Nondiabetic.  Denies related family hx.     EXAM:Vitals: /80   Pulse 76   Ht 1.702 m (5' 7\")   Wt 95.3 kg (210 lb)   BMI 32.89 kg/m    BMI= Body mass index is 32.89 kg/m .    General appearance: Patient is alert and fully cooperative with history & exam.  No sign of distress is noted during the visit.     Dermatologic: Skin is intact to R foot without significant lesions, rash or abrasion.  No paronychia or evidence of soft tissue infection is noted.     Vascular: DP & PT pulses are intact & regular on the R.  No significant edema or varicosities noted.  CFT and skin temperature are normal.     Neurologic: Lower extremity sensation is intact to light touch.  No evidence of weakness or contracture in the lower extremities.  No evidence of neuropathy.     Musculoskeletal: R foot pain along course of peroneal tendons, at cuboid, also to plantar heel.  Patient is ambulatory with a boot.  No gross ankle deformity noted.  No foot or ankle joint effusion is noted.  No calf pain, redness, or swelling.    MR reviewed with pt:    Impression:  1.  Focal bone marrow edema involving the plantar cuboid with subtle  fracture line likely representing impaction fracture vs. insufficiency  fracture, which may be related to adjacent peroneal tendon pathology.  2.  Tendinosis and tenosynovitis involving the peroneus longus at the  level of the cuboid with possible partial tearing of the peroneus  longus   3.  Plantar fasciitis and partial tearing involving the proximal  aspect of the " central cord of the plantar fascia with associated bone  marrow edema of the calcaneus.     I have personally reviewed the examination and initial interpretation  and I agree with the findings.     AKHIL GURROLA    Plain XRs of R foot also taken today, reviewed with pt.  Difficult to see any cuboid fx on plain film.     ASSESSMENT:   R foot cuboid fx  R peroneus longus partial tear  R plantar fascia tear     PLAN:  Reviewed patient's chart in epic.  Discussed condition and treatment options including pros and cons.    Pt with multiple soft tissue and bone injuries, including cuboid impaction fx, partial tear of peroneus longus, plantar fascia tear.  Discussed nonoperative treatment would be reasonable at this time.  RICE.  NWB in boot advised.  Will write order for rollabout.  F/u in 4 wks.     Hx of DVT noted.     We discussed immobilization and the risk of blood clot. Leg raises, knee bends several times/day out of boot and compression recommended. Patient to seek medical attention if calf swelling and/or pain, chest pain, shortness of breath.  81mg ASA bid advised.    Wenceslao Townsend DPM, FACFAS    Weight management plan: Patient was referred to their PCP to discuss a diet and exercise plan.

## 2019-12-10 ENCOUNTER — OFFICE VISIT (OUTPATIENT)
Dept: PEDIATRICS | Facility: CLINIC | Age: 44
End: 2019-12-10
Payer: COMMERCIAL

## 2019-12-10 VITALS
SYSTOLIC BLOOD PRESSURE: 130 MMHG | HEART RATE: 92 BPM | HEIGHT: 67 IN | OXYGEN SATURATION: 97 % | BODY MASS INDEX: 32.89 KG/M2 | TEMPERATURE: 96 F | DIASTOLIC BLOOD PRESSURE: 80 MMHG

## 2019-12-10 DIAGNOSIS — F41.8 DEPRESSION WITH ANXIETY: Primary | ICD-10-CM

## 2019-12-10 DIAGNOSIS — Z13.1 SCREENING FOR DIABETES MELLITUS (DM): ICD-10-CM

## 2019-12-10 DIAGNOSIS — Z13.29 SCREENING FOR THYROID DISORDER: ICD-10-CM

## 2019-12-10 DIAGNOSIS — E55.9 VITAMIN D INSUFFICIENCY: ICD-10-CM

## 2019-12-10 DIAGNOSIS — Z13.6 CARDIOVASCULAR SCREENING; LDL GOAL LESS THAN 160: ICD-10-CM

## 2019-12-10 DIAGNOSIS — M62.838 SPASM OF MUSCLE: ICD-10-CM

## 2019-12-10 DIAGNOSIS — Z12.31 ENCOUNTER FOR SCREENING MAMMOGRAM FOR BREAST CANCER: ICD-10-CM

## 2019-12-10 LAB
ALBUMIN SERPL-MCNC: 3.6 G/DL (ref 3.4–5)
ALP SERPL-CCNC: 66 U/L (ref 40–150)
ALT SERPL W P-5'-P-CCNC: 21 U/L (ref 0–50)
ANION GAP SERPL CALCULATED.3IONS-SCNC: 4 MMOL/L (ref 3–14)
AST SERPL W P-5'-P-CCNC: 18 U/L (ref 0–45)
BILIRUB SERPL-MCNC: 0.6 MG/DL (ref 0.2–1.3)
BUN SERPL-MCNC: 11 MG/DL (ref 7–30)
CALCIUM SERPL-MCNC: 9 MG/DL (ref 8.5–10.1)
CHLORIDE SERPL-SCNC: 109 MMOL/L (ref 94–109)
CHOLEST SERPL-MCNC: 204 MG/DL
CO2 SERPL-SCNC: 27 MMOL/L (ref 20–32)
CREAT SERPL-MCNC: 0.64 MG/DL (ref 0.52–1.04)
GFR SERPL CREATININE-BSD FRML MDRD: >90 ML/MIN/{1.73_M2}
GLUCOSE SERPL-MCNC: 83 MG/DL (ref 70–99)
HDLC SERPL-MCNC: 60 MG/DL
LDLC SERPL CALC-MCNC: 125 MG/DL
NONHDLC SERPL-MCNC: 144 MG/DL
POTASSIUM SERPL-SCNC: 3.5 MMOL/L (ref 3.4–5.3)
PROT SERPL-MCNC: 7 G/DL (ref 6.8–8.8)
SODIUM SERPL-SCNC: 140 MMOL/L (ref 133–144)
TRIGL SERPL-MCNC: 93 MG/DL
TSH SERPL DL<=0.005 MIU/L-ACNC: 0.62 MU/L (ref 0.4–4)

## 2019-12-10 PROCEDURE — 80053 COMPREHEN METABOLIC PANEL: CPT | Performed by: NURSE PRACTITIONER

## 2019-12-10 PROCEDURE — 84443 ASSAY THYROID STIM HORMONE: CPT | Performed by: NURSE PRACTITIONER

## 2019-12-10 PROCEDURE — 80061 LIPID PANEL: CPT | Performed by: NURSE PRACTITIONER

## 2019-12-10 PROCEDURE — 36415 COLL VENOUS BLD VENIPUNCTURE: CPT | Performed by: NURSE PRACTITIONER

## 2019-12-10 PROCEDURE — 99214 OFFICE O/P EST MOD 30 MIN: CPT | Performed by: NURSE PRACTITIONER

## 2019-12-10 PROCEDURE — 82306 VITAMIN D 25 HYDROXY: CPT | Performed by: NURSE PRACTITIONER

## 2019-12-10 RX ORDER — VENLAFAXINE HYDROCHLORIDE 150 MG/1
150 CAPSULE, EXTENDED RELEASE ORAL DAILY
Qty: 30 CAPSULE | Refills: 0 | Status: CANCELLED | OUTPATIENT
Start: 2019-12-10

## 2019-12-10 RX ORDER — VENLAFAXINE HYDROCHLORIDE 150 MG/1
150 CAPSULE, EXTENDED RELEASE ORAL DAILY
Qty: 90 CAPSULE | Refills: 3 | Status: SHIPPED | OUTPATIENT
Start: 2019-12-10 | End: 2020-11-05

## 2019-12-10 RX ORDER — CYCLOBENZAPRINE HCL 10 MG
10 TABLET ORAL 3 TIMES DAILY PRN
Qty: 30 TABLET | Refills: 1 | Status: SHIPPED | OUTPATIENT
Start: 2019-12-10 | End: 2020-04-13

## 2019-12-10 ASSESSMENT — ANXIETY QUESTIONNAIRES
1. FEELING NERVOUS, ANXIOUS, OR ON EDGE: SEVERAL DAYS
2. NOT BEING ABLE TO STOP OR CONTROL WORRYING: SEVERAL DAYS
6. BECOMING EASILY ANNOYED OR IRRITABLE: SEVERAL DAYS
7. FEELING AFRAID AS IF SOMETHING AWFUL MIGHT HAPPEN: SEVERAL DAYS
GAD7 TOTAL SCORE: 7
5. BEING SO RESTLESS THAT IT IS HARD TO SIT STILL: SEVERAL DAYS
3. WORRYING TOO MUCH ABOUT DIFFERENT THINGS: NOT AT ALL
IF YOU CHECKED OFF ANY PROBLEMS ON THIS QUESTIONNAIRE, HOW DIFFICULT HAVE THESE PROBLEMS MADE IT FOR YOU TO DO YOUR WORK, TAKE CARE OF THINGS AT HOME, OR GET ALONG WITH OTHER PEOPLE: SOMEWHAT DIFFICULT

## 2019-12-10 ASSESSMENT — PATIENT HEALTH QUESTIONNAIRE - PHQ9
5. POOR APPETITE OR OVEREATING: MORE THAN HALF THE DAYS
SUM OF ALL RESPONSES TO PHQ QUESTIONS 1-9: 10

## 2019-12-10 NOTE — NURSING NOTE
"Chief Complaint   Patient presents with     Physical     AFE-fasting today       Initial /80 (BP Location: Right arm, Patient Position: Sitting, Cuff Size: Adult Large)   Pulse 92   Temp 96  F (35.6  C) (Temporal)   Ht 1.702 m (5' 7\")   LMP 12/01/2019 (Approximate)   SpO2 97%   Breastfeeding No   BMI 32.89 kg/m   Estimated body mass index is 32.89 kg/m  as calculated from the following:    Height as of this encounter: 1.702 m (5' 7\").    Weight as of 11/25/19: 95.3 kg (210 lb).  Medication Reconciliation: complete      DANIS Silverio      "

## 2019-12-10 NOTE — PROGRESS NOTES
SUBJECTIVE:   CC: Roxane Lopes is an 44 year old woman who presents for preventive health visit.     Depression and Anxiety Follow-Up    How are you doing with your depression since your last visit? No change    How are you doing with your anxiety since your last visit?  No change    Are you having other symptoms that might be associated with depression or anxiety? No    Have you had a significant life event? OTHER: fracture right foot     Do you have any concerns with your use of alcohol or other drugs? No      For the last 3 months  right foot fracture after walking dog outside and had gone to urgent care and then eventually had to do an MRI   Now is going to see podiatrist later this month for follow up on this   This this may be impacting her mood as well   Also muscles are tight at the end of the day with using her scooter   Social History     Tobacco Use     Smoking status: Former Smoker     Packs/day: 0.50     Years: 10.00     Pack years: 5.00     Types: Cigarettes     Last attempt to quit: 10/16/2012     Years since quittin.1     Smokeless tobacco: Never Used     Tobacco comment: on e cigs now trying to quit   Substance Use Topics     Alcohol use: Yes     Comment: Maybe once a year     Drug use: No     PHQ-9 SCORE 10/4/2018 10/3/2019 12/10/2019   PHQ-9 Total Score - - -   PHQ-9 Total Score 11 11 10       OLLIE-7 SCORE 10/4/2018 10/3/2019 12/10/2019   Total Score - - -   Total Score 15 12 7       [facility] Follow-up to PHQ 10/4/2018 10/3/2019 12/10/2019   PHQ-9 9. Suicide Ideation past 2 weeks Not at all Not at all Not at all       In the past two weeks have you had thoughts of suicide or self-harm?  No.    Do you have concerns about your personal safety or the safety of others?   No    Suicide Assessment Five-step Evaluation and Treatment (SAFE-T)      Today's PHQ-2 Score:   PHQ-2 (  [employer]) 12/10/2019 10/4/2018   Q1: Little interest or pleasure in doing things 1 0   Q2: Feeling down, depressed or  hopeless 1 0   PHQ-2 Score 2 0       Abuse: Current or Past(Physical, Sexual or Emotional)- No  Do you feel safe in your environment? Yes        Social History     Tobacco Use     Smoking status: Former Smoker     Packs/day: 0.50     Years: 10.00     Pack years: 5.00     Types: Cigarettes     Last attempt to quit: 10/16/2012     Years since quittin.1     Smokeless tobacco: Never Used     Tobacco comment: on e cigs now trying to quit   Substance Use Topics     Alcohol use: Yes     Comment: Maybe once a year     If you drink alcohol do you typically have >3 drinks per day or >7 drinks per week? No                     Reviewed orders with patient.  Reviewed health maintenance and updated orders accordingly - Yes  Labs reviewed in EPIC  BP Readings from Last 3 Encounters:   12/10/19 130/80   19 130/80   19 136/82    Wt Readings from Last 3 Encounters:   19 95.3 kg (210 lb)   19 95.8 kg (211 lb 3.2 oz)   10/03/19 95.2 kg (209 lb 12.8 oz)                  Patient Active Problem List   Diagnosis     Varicose veins     Left leg pain     DVT (deep venous thrombosis) (H)     PTSD (post-traumatic stress disorder)     Tobacco use disorder     Venous insufficiency of leg-left     Prediabetes     Thumb injury     Anxiety     Gastroesophageal reflux disease without esophagitis     Galactorrhea of left breast     Generalized hyperhidrosis     Fatigue     CARDIOVASCULAR SCREENING; LDL GOAL LESS THAN 160     Depression with anxiety     Past Surgical History:   Procedure Laterality Date     LAPAROSCOPIC CHOLECYSTECTOMY  10/9/2012    Procedure: LAPAROSCOPIC CHOLECYSTECTOMY;  Laparoscopic Cholecystectomy;  Surgeon: Martell Briscoe MD;  Location: [facility] OR     LAPAROSCOPIC TUBAL LIGATION         Social History     Tobacco Use     Smoking status: Former Smoker     Packs/day: 0.50     Years: 10.00     Pack years: 5.00     Types: Cigarettes     Last attempt to quit: 10/16/2012     Years since quittin.1      Smokeless tobacco: Never Used     Tobacco comment: on e cigs now trying to quit   Substance Use Topics     Alcohol use: Yes     Comment: Maybe once a year     Family History   Problem Relation Age of Onset     Diabetes Maternal Grandfather      Other - See Comments Maternal Grandmother         24 yrs old blood clot     Depression Mother      Diabetes Other      C.A.D. No family hx of      Cerebrovascular Disease No family hx of      Hypertension No family hx of      Anesthesia Reaction No family hx of      Arthritis No family hx of      Asthma No family hx of      Breast Cancer No family hx of      Cancer - colorectal No family hx of      Prostate Cancer No family hx of      Thyroid Disease No family hx of      Lipids No family hx of      Congenital Anomalies No family hx of          Current Outpatient Medications   Medication Sig Dispense Refill     ibuprofen (ADVIL/MOTRIN) 800 MG tablet Take 1 tablet (800 mg) by mouth every 8 hours as needed for moderate pain 30 tablet 1     order for DME rollabout  3 month duration 1 Device 0     order for DME Medium short Aircast boot 1 Device 0     venlafaxine (EFFEXOR-XR) 150 MG 24 hr capsule Take 1 capsule (150 mg) by mouth daily 30 capsule 0     diclofenac (VOLTAREN) 75 MG EC tablet Take 1 tablet (75 mg) by mouth 2 times daily 60 tablet 1     Allergies   Allergen Reactions     Chantix [Varenicline Tartrate] Anxiety       Mammogram Screening: Patient under age 50, mutual decision reflected in health maintenance.      Pertinent mammograms are reviewed under the imaging tab.    Reviewed and updated as needed this visit by clinical staff  Tobacco  Allergies  Med Hx  Surg Hx  Fam Hx  Soc Hx        Reviewed and updated as needed this visit by Provider        Past Medical History:   Diagnosis Date     Depression      DVT (deep venous thrombosis) (H) 4/2011    left leg, was on lovenox and coumadin but have been off since November 2011     H/O tubal ligation     2006       "Prediabetes       Past Surgical History:   Procedure Laterality Date     LAPAROSCOPIC CHOLECYSTECTOMY  10/9/2012    Procedure: LAPAROSCOPIC CHOLECYSTECTOMY;  Laparoscopic Cholecystectomy;  Surgeon: Martell Briscoe MD;  Location: [facility] OR     LAPAROSCOPIC TUBAL LIGATION         ROS:  Constitutional, HEENT, cardiovascular, pulmonary, gi and gu systems are negative, except as otherwise noted.      OBJECTIVE:   /80 (BP Location: Right arm, Patient Position: Sitting, Cuff Size: Adult Large)   Pulse 92   Temp 96  F (35.6  C) (Temporal)   Ht 1.702 m (5' 7\")   LMP 12/01/2019 (Approximate)   SpO2 97%   Breastfeeding No   BMI 32.89 kg/m    EXAM:  GENERAL APPEARANCE: alert, active and no distress  RESP: lungs clear to auscultation - no rales, rhonchi or wheezes  CV: regular rates and rhythm and no murmur, click or rub  MS: extremities normal- no gross deformities noted  ORTHO: Right foot in walking boot and is non weight bearing   SKIN: no suspicious lesions or rashes  NEURO: Normal strength and tone, mentation intact and speech normal  PSYCH: mentation appears normal and affect normal/bright  MENTAL STATUS EXAM:  Appearance/Behavior: No apparent distress, Casually groomed and Dressed appropriately for weather  Speech: Normal  Mood/Affect: normal affect  Insight: Adequate      Diagnostic Test Results:  Labs reviewed in Epic  Pending orders and results      ASSESSMENT/PLAN:   Roxane was seen today for physical.    Diagnoses and all orders for this visit:    Depression with anxiety  -     venlafaxine (EFFEXOR-XR) 150 MG 24 hr capsule; Take 1 capsule (150 mg) by mouth daily  Reviewed concept of depression as function of biochemical imbalance of neurotransmitters/rationale for treatment.  Risks and benefits of medication(s) reviewed with patient.  Questions answered.  Counseling advised  Patient instructed to call for significant side effects medications or problems  Patient advised immediate presentation to hospital for " "suicidal thought, etc.  Continue current medication regimen unchanged.      Spasm of muscle  Start -     cyclobenzaprine (FLEXERIL) 10 MG tablet; Take 1 tablet (10 mg) by mouth 3 times daily as needed for muscle spasms    CARDIOVASCULAR SCREENING; LDL GOAL LESS THAN 160  -     Lipid panel reflex to direct LDL Fasting; Future  -     Lipid panel reflex to direct LDL Fasting    Screening for diabetes mellitus (DM)  -     JUST IN CASE; Future  -     Comprehensive metabolic panel; Future  -     Comprehensive metabolic panel    Screening for thyroid disorder  -     TSH with free T4 reflex; Future  -     TSH with free T4 reflex    Encounter for screening mammogram for breast cancer  -     *MA Screening Digital Bilateral; Future    Vitamin D insufficiency  -     Vitamin D Deficiency    PLAN:   Patient Instructions     PLAN:   1.   Symptomatic therapy suggested: Continue current medication regimen unchanged.  Increase calcium to 1000mg and 800iu Vit D  2.  Orders Placed This Encounter   Medications     venlafaxine (EFFEXOR-XR) 150 MG 24 hr capsule     Sig: Take 1 capsule (150 mg) by mouth daily     Dispense:  90 capsule     Refill:  3     Orders Placed This Encounter   Procedures     *MA Screening Digital Bilateral     JUST IN CASE     Lipid panel reflex to direct LDL Fasting     Comprehensive metabolic panel     TSH with free T4 reflex     Vitamin D Deficiency     3. Patient needs to follow up in if no improvement,or sooner if worsening of symptoms or other symptoms develop.  FURTHER TESTING:       - mammogram  I will place order. Please call [phone] to schedule.  Will follow up and/or notify patient of  results via My Chart to determine further need for followup  Schedule physical exam         Estimated body mass index is 32.89 kg/m  as calculated from the following:    Height as of 11/25/19: 1.702 m (5' 7\").    Weight as of 11/25/19: 95.3 kg (210 lb).    Weight management plan: Discussed healthy diet and exercise " guidelines     reports that she quit smoking about 7 years ago. Her smoking use included cigarettes. She has a 5.00 pack-year smoking history. She has never used smokeless tobacco.      Counseling Resources:  ATP IV Guidelines  Pooled Cohorts Equation Calculator  Breast Cancer Risk Calculator  FRAX Risk Assessment  ICSI Preventive Guidelines  Dietary Guidelines for Americans, 2010  USDA's MyPlate  ASA Prophylaxis  Lung CA Screening    MEENAKSHI Ramos [facility]

## 2019-12-10 NOTE — PATIENT INSTRUCTIONS
PLAN:   1.   Symptomatic therapy suggested: Continue current medication regimen unchanged.  Increase calcium to 1000mg and 800iu Vit D  2.  Orders Placed This Encounter   Medications     venlafaxine (EFFEXOR-XR) 150 MG 24 hr capsule     Sig: Take 1 capsule (150 mg) by mouth daily     Dispense:  90 capsule     Refill:  3     Orders Placed This Encounter   Procedures     *MA Screening Digital Bilateral     JUST IN CASE     Lipid panel reflex to direct LDL Fasting     Comprehensive metabolic panel     TSH with free T4 reflex     Vitamin D Deficiency     3. Patient needs to follow up in if no improvement,or sooner if worsening of symptoms or other symptoms develop.  FURTHER TESTING:       - mammogram  I will place order. Please call [phone] to schedule.  Will follow up and/or notify patient of  results via My Chart to determine further need for followup  Schedule physical exam

## 2019-12-11 LAB — DEPRECATED CALCIDIOL+CALCIFEROL SERPL-MC: 20 UG/L (ref 20–75)

## 2019-12-11 ASSESSMENT — ANXIETY QUESTIONNAIRES: GAD7 TOTAL SCORE: 7

## 2019-12-12 NOTE — RESULT ENCOUNTER NOTE
Martinez Lopes,    Attached are your test results.  -LDL(bad) cholesterol level is elevated which can increase your heart disease risk.  A diet high in fat and simple carbohydrates, genetics and being overweight can contribute to this. ADVISE: exercising 150 minutes of aerobic exercise per week (30 minutes for 5 days per week or 50 minutes for 3 days per week are options) and eating a low saturated fat/low carbohydrate diet are helpful to improve this. In 12 months, you should recheck your fasting cholesterol panel by scheduling a lab-only appointment.  -Liver and gallbladder tests are normal (ALT,AST, Alk phos, bilirubin), kidney function is normal (Cr, GFR), sodium is normal, potassium is normal, calcium is normal, glucose is normal.  -TSH (thyroid stimulating hormone) level is normal which indicates normal thyroid function.  -Vitamin D level is  Below normal and getting 2000 IU daily in your diet or supplements is recommended.    Please contact us if you have any questions.    Miguelina Bledsoe, CNP

## 2019-12-30 ENCOUNTER — OFFICE VISIT (OUTPATIENT)
Dept: PODIATRY | Facility: CLINIC | Age: 44
End: 2019-12-30
Payer: COMMERCIAL

## 2019-12-30 ENCOUNTER — ANCILLARY PROCEDURE (OUTPATIENT)
Dept: GENERAL RADIOLOGY | Facility: CLINIC | Age: 44
End: 2019-12-30
Attending: PODIATRIST
Payer: COMMERCIAL

## 2019-12-30 VITALS
WEIGHT: 210 LBS | HEIGHT: 67 IN | HEART RATE: 77 BPM | SYSTOLIC BLOOD PRESSURE: 118 MMHG | BODY MASS INDEX: 32.96 KG/M2 | DIASTOLIC BLOOD PRESSURE: 82 MMHG

## 2019-12-30 DIAGNOSIS — S86.311D TEAR OF PERONEAL TENDON, RIGHT, SUBSEQUENT ENCOUNTER: ICD-10-CM

## 2019-12-30 DIAGNOSIS — S93.691D RUPTURE OF PLANTAR FASCIA OF RIGHT FOOT, SUBSEQUENT ENCOUNTER: ICD-10-CM

## 2019-12-30 DIAGNOSIS — S92.214D CLOSED NONDISPLACED FRACTURE OF CUBOID OF RIGHT FOOT WITH ROUTINE HEALING, SUBSEQUENT ENCOUNTER: Primary | ICD-10-CM

## 2019-12-30 DIAGNOSIS — S92.214D CLOSED NONDISPLACED FRACTURE OF CUBOID OF RIGHT FOOT WITH ROUTINE HEALING, SUBSEQUENT ENCOUNTER: ICD-10-CM

## 2019-12-30 PROCEDURE — 99213 OFFICE O/P EST LOW 20 MIN: CPT | Performed by: PODIATRIST

## 2019-12-30 PROCEDURE — 73630 X-RAY EXAM OF FOOT: CPT | Mod: RT

## 2019-12-30 ASSESSMENT — MIFFLIN-ST. JEOR: SCORE: 1645.18

## 2019-12-30 NOTE — PATIENT INSTRUCTIONS
Thank you for choosing [facility]!    Follow up in 4 weeks    DR. CASTRO'S CLINIC LOCATIONS     MONDAY  [place] TUESDAY & FRIDAY AM  [place]   [address] [facility]   [address]   [place] [address] [place] [address]   [phone]  [phone]  [phone]       WEDNESDAY  [place] SCHEDULE SURGERY: [phone]   [address] [facility] APPOINTMENTS: [phone]   [place] [address] BILLING QUESTIONS: [phone]   [phone]       PRICE THERAPY  Many aches and pains throughout the foot and ankle can be helped with many simple treatments. This is usually described as PRICE Therapy.      P - Protection - often times, inflammation/pain in the lower extremity is not able to improve simply because the areas involved are never allowed to rest. Every step we take can bother the problematic area. Protecting those areas is an important step in the healing process. This may involve a walking cast boot, a special insert/orthotic device, an ankle brace, or simply avoiding barefoot walking.    R - Rest - in addition to protecting the foot/ankle, resting is an important, but often times difficult, treatment option. Getting off your feet when they bother you, and specifically avoiding activities that cause pain/discomfort, are very beneficial to prevent, and treat, foot/ankle pain.      I - Ice - icing regularly can help to decrease inflammation and swelling in the foot, thus decreasing pain. Using an ice pack or a bag of frozen veggies works very well. Ice for 20 minutes multiple times per day as needed.  Do not place the ice directly on the skin as this can cause tissue damage.    C - Compression - using a compression wrap or an ACE wrap can help to decrease swelling, which can help to decrease pain. Wearing the wraps is generally not needed at night, but they should be worn on a regular basis when you are going to be on your  feet for prolonged periods as gravity tends to pull fluids down to your feet/ankles.    E - Elevation - elevating your lower extremities multiple times daily for 15-20 minutes can help to decrease swelling, which works well in decreasing pain levels.    NSAID/Tylenol - Anti-inflammatories like Aleve or ibuprofen, and/or a pain medication, such as Tylenol, can help to improve pain levels and get the issue resolved sooner rather than later. Anyone with liver issues should be careful with Tylenol, and anyone with high blood pressure or heart, stomach or kidney issues should be careful with anti-inflammatories. Please ask if you have questions about these medications, including dosage.    BLOOD CLOT PREVENTION - WEARING A CAST BOOT    Do not drive with the CAM boot  Do not wear during long-distance travel: long car rides / plane trips  Wear the boot when moving, regardless of your weight-bearing status    Any brace that extends up to the knee can increase your chance of developing a blood clot. Blood clots generally occur in the large veins of your calf, thigh, or abdomen. A blood clot may form when blood is stagnant in the veins while your leg is immobilized in the brace. Dhara and relaxing the calf muscles by moving the ankle is the best method to avoid stagnant blood. Clarify with your doctor if you should be doing this as this may not be recommended for certain tendon surgeries.    Blood clots or deep venous thrombosis (DVT) can be life threatening if a portion of the clot breaks away and travels through the veins to your lungs. This is called a pulmonary embolism (PE) which can result in death.    Symptoms of a DVT may include swelling, tenderness, a warm feeling or redness in the leg. DVT can occur in both legs, even if only one side is in a brace. If you have these symptoms, you should call your doctor immediately or go to the Emergency Room to have your leg scanned.     Symptoms of a pulmonary embolism  (PE) include chest pain, shortness of breath or the need to breath rapidly that is not associated with exercise, difficulty breathing, rapid heart rate, coughing or coughing up blood, or a feeling of passing out. Chest pain can range from mild to knife-like pain while taking a deep breath. Pulmonary embolism can occur without any symptoms whatsoever. You need to be evaluated in a hospital emergency room immediately if you have symptoms of a PE. Please call 911 as PE is a life-threatening emergency.    Be certain that you understand how much ankle range of motion is allowed. Your foot and ankle problem is being immobilized by the brace, yet we do not want you to develop a blood clot. The velcro strap braces are intended to be removed for periodic exercise of the ankle. Your doctor will tell you if it is okay to do this depending on the type of surgery or injury you had.    Your own personal risk of developing a DVT or PE is dependent on many factors. A personal or family history of previous blood clot or a clotting disorder (such as thrombophilia) puts you at risk. Other risk factors include history of cancer, current use of estrogen medications (such as birth control pills or post menopausal medications), recent trauma or fracture, diabetes, recent heart attack, COPD, heart failure, pregnancy, obesity, advanced age, smoking, etc. Please make sure your doctor is aware if you have any of these risk factors.          BODY WEIGHT AND YOUR FEET  The following information is included in the after visit summary for all patients. Body weight can be a sensitive issue to discuss in clinic, but we think the following information is very important. Although we focus on the feet and ankles, we do support the overall health of our patients.     Many things can cause foot and ankle problems. Foot structure, activity level, foot mechanics and injuries are common causes of pain. One very important issue that often goes unmentioned,  is body weight. Extra weight can cause increased stress on muscles, ligaments, bones and tendons. Sometimes just a few extra pounds is all it takes to put one over her/his threshold. Without reducing that stress, it can be difficult to alleviate pain. As Foot & Ankle specialists, our job is addressing the lower extremity problem and possible causes. Regarding extra body weight, we encourage patients to discuss diet and weight management plans with their primary care doctors. It is this team approach that gives you the best opportunity for pain relief and getting you back on your feet.      [facility] has a Comprehensive Weight Management Program. This program includes counseling, education, non-surgical and surgical approaches to weight loss. If you are interested in learning more either talk to you primary care provider or call [phone].

## 2019-12-30 NOTE — PROGRESS NOTES
"PATIENT HISTORY:  Roxane Lopes is a 44 year old female who presents to clinic for follow up on R foot injuries.  Injury almost 4 months ago.  Pt reports letting her dog out and \"stepping down\" on uneven terrain, causing the injury.  She is in a boot, NWB, pain improved.  No fevers, chills, new injury, calf pain.  Nonsmoker.  Works as a pharm tech.  Nondiabetic.  Denies related family hx.     EXAM:/82   Pulse 77   Ht 1.702 m (5' 7\")   Wt 95.3 kg (210 lb)   LMP 12/01/2019 (Approximate)   BMI 32.89 kg/m      General appearance: Patient is alert and fully cooperative with history & exam.  No sign of distress is noted during the visit.     Dermatologic: Skin is intact to R foot without significant lesions, rash or abrasion.  No paronychia or evidence of soft tissue infection is noted.     Vascular: DP & PT pulses are intact & regular on the R.  No significant edema or varicosities noted.  CFT and skin temperature are normal.     Neurologic: Lower extremity sensation is intact to light touch.  No evidence of weakness or contracture in the lower extremities.  No evidence of neuropathy.     Musculoskeletal: R foot mild pain along course of peroneal tendons, at plantar cuboid, also to plantar heel.  Patient is in a boot.  No gross ankle deformity noted.  No foot or ankle joint effusion is noted.  No calf pain, redness, or swelling.  Muscle strength normal to all compartments of RLE.    Plain XRs of R foot also taken today, reviewed with pt.  Difficult to see any cuboid fx on plain film.     ASSESSMENT:   R foot cuboid impaction fx  R peroneus longus partial tear  R plantar fascia tear  --injuries are MR indicated     PLAN:  Reviewed patient's chart in epic.  Discussed condition and treatment options including pros and cons.    Pt improved clinically.  She feels ready to bear weight.  OK for WBAT in the boot at this time.  Discussed a gradual protocol.  Will start PT.  F/u in 4 wks.    Wenceslao Townsend DPM, " FACFAS    Weight management plan: Patient was referred to their PCP to discuss a diet and exercise plan.

## 2019-12-30 NOTE — LETTER
"    12/30/2019         RE: Roxane Lopes  [address] [place] [id number]        Dear Colleague,    Thank you for referring your patient, Roxane Lopes, to the [facility]. Please see a copy of my visit note below.    PATIENT HISTORY:  Roxane Lopes is a 44 year old female who presents to clinic for follow up on R foot injuries.  Injury almost 4 months ago.  Pt reports letting her dog out and \"stepping down\" on uneven terrain, causing the injury.  She is in a boot, NWB, pain improved.  No fevers, chills, new injury, calf pain.  Nonsmoker.  Works as a pharm tech.  Nondiabetic.  Denies related family hx.     EXAM:/82   Pulse 77   Ht 1.702 m (5' 7\")   Wt 95.3 kg (210 lb)   LMP 12/01/2019 (Approximate)   BMI 32.89 kg/m       General appearance: Patient is alert and fully cooperative with history & exam.  No sign of distress is noted during the visit.     Dermatologic: Skin is intact to R foot without significant lesions, rash or abrasion.  No paronychia or evidence of soft tissue infection is noted.     Vascular: DP & PT pulses are intact & regular on the R.  No significant edema or varicosities noted.  CFT and skin temperature are normal.     Neurologic: Lower extremity sensation is intact to light touch.  No evidence of weakness or contracture in the lower extremities.  No evidence of neuropathy.     Musculoskeletal: R foot mild pain along course of peroneal tendons, at plantar cuboid, also to plantar heel.  Patient is in a boot.  No gross ankle deformity noted.  No foot or ankle joint effusion is noted.  No calf pain, redness, or swelling.  Muscle strength normal to all compartments of RLE.    Plain XRs of R foot also taken today, reviewed with pt.  Difficult to see any cuboid fx on plain film.     ASSESSMENT:   R foot cuboid impaction fx  R peroneus longus partial tear  R plantar fascia tear  --injuries are MR indicated     PLAN:  Reviewed patient's chart in " epic.  Discussed condition and treatment options including pros and cons.    Pt improved clinically.  She feels ready to bear weight.  OK for WBAT in the boot at this time.  Discussed a gradual protocol.  Will start PT.  F/u in 4 wks.    Wenceslao Townsend DPM, FACFAS    Weight management plan: Patient was referred to their PCP to discuss a diet and exercise plan.      Again, thank you for allowing me to participate in the care of your patient.        Sincerely,        Wenceslao Townsend DPM

## 2020-01-29 ENCOUNTER — OFFICE VISIT (OUTPATIENT)
Dept: PODIATRY | Facility: CLINIC | Age: 45
End: 2020-01-29
Payer: COMMERCIAL

## 2020-01-29 ENCOUNTER — ANCILLARY PROCEDURE (OUTPATIENT)
Dept: GENERAL RADIOLOGY | Facility: CLINIC | Age: 45
End: 2020-01-29
Attending: PODIATRIST
Payer: COMMERCIAL

## 2020-01-29 VITALS
BODY MASS INDEX: 32.18 KG/M2 | WEIGHT: 205 LBS | DIASTOLIC BLOOD PRESSURE: 83 MMHG | HEART RATE: 83 BPM | HEIGHT: 67 IN | SYSTOLIC BLOOD PRESSURE: 138 MMHG

## 2020-01-29 DIAGNOSIS — S92.214D CLOSED NONDISPLACED FRACTURE OF CUBOID OF RIGHT FOOT WITH ROUTINE HEALING, SUBSEQUENT ENCOUNTER: ICD-10-CM

## 2020-01-29 DIAGNOSIS — S92.214D CLOSED NONDISPLACED FRACTURE OF CUBOID OF RIGHT FOOT WITH ROUTINE HEALING, SUBSEQUENT ENCOUNTER: Primary | ICD-10-CM

## 2020-01-29 DIAGNOSIS — S86.312D TEAR OF PERONEAL TENDON, LEFT, SUBSEQUENT ENCOUNTER: ICD-10-CM

## 2020-01-29 DIAGNOSIS — S93.692D RUPTURE OF PLANTAR FASCIA OF LEFT FOOT, SUBSEQUENT ENCOUNTER: ICD-10-CM

## 2020-01-29 PROCEDURE — 73630 X-RAY EXAM OF FOOT: CPT | Mod: RT

## 2020-01-29 PROCEDURE — 99213 OFFICE O/P EST LOW 20 MIN: CPT | Performed by: PODIATRIST

## 2020-01-29 ASSESSMENT — MIFFLIN-ST. JEOR: SCORE: 1622.5

## 2020-01-29 NOTE — PATIENT INSTRUCTIONS
Thank you for choosing [facility]!    Follow up as needed    DR. CASTRO'S CLINIC LOCATIONS     MONDAY  [place] TUESDAY & FRIDAY AM  [place]   [address]   [address]   [place] [address] [place] [address]   [phone]  [phone]  [phone]       WEDNESDAY  [place] SCHEDULE SURGERY: [phone]   [address] APPOINTMENTS: [phone]   [place] [address] BILLING QUESTIONS: [phone]   [phone]       OVER THE COUNTER INSERTS    Most of these can be found at your local [employer], or online:    [employer]   Sofsole Fit [employer]   Power Step   Walk-Fit  ([employer]) [employer]       **A good high quality over the counter insert should cost around $40-$50        PRICE THERAPY  Many aches and pains throughout the foot and ankle can be helped with many simple treatments. This is usually described as PRICE Therapy.      P - Protection - often times, inflammation/pain in the lower extremity is not able to improve simply because the areas involved are never allowed to rest. Every step we take can bother the problematic area. Protecting those areas is an important step in the healing process. This may involve a walking cast boot, a special insert/orthotic device, an ankle brace, or simply avoiding barefoot walking.    R - Rest - in addition to protecting the foot/ankle, resting is an important, but often times difficult, treatment option. Getting off your feet when they bother you, and specifically avoiding activities that cause pain/discomfort, are very beneficial to prevent, and treat, foot/ankle pain.      I - Ice - icing regularly can help to decrease inflammation and swelling in the foot, thus decreasing pain. Using an ice pack or a bag of frozen veggies works very well. Ice for 20 minutes multiple times per day as needed.  Do not place the  ice directly on the skin as this can cause tissue damage.    C - Compression - using a compression wrap or an ACE wrap can help to decrease swelling, which can help to decrease pain. Wearing the wraps is generally not needed at night, but they should be worn on a regular basis when you are going to be on your feet for prolonged periods as gravity tends to pull fluids down to your feet/ankles.    E - Elevation - elevating your lower extremities multiple times daily for 15-20 minutes can help to decrease swelling, which works well in decreasing pain levels.    NSAID/Tylenol - Anti-inflammatories like Aleve or ibuprofen, and/or a pain medication, such as Tylenol, can help to improve pain levels and get the issue resolved sooner rather than later. Anyone with liver issues should be careful with Tylenol, and anyone with high blood pressure or heart, stomach or kidney issues should be careful with anti-inflammatories. Please ask if you have questions about these medications, including dosage.      BODY WEIGHT AND YOUR FEET  The following information is included in the after visit summary for all patients. Body weight can be a sensitive issue to discuss in clinic, but we think the following information is very important. Although we focus on the feet and ankles, we do support the overall health of our patients.     Many things can cause foot and ankle problems. Foot structure, activity level, foot mechanics and injuries are common causes of pain. One very important issue that often goes unmentioned, is body weight. Extra weight can cause increased stress on muscles, ligaments, bones and tendons. Sometimes just a few extra pounds is all it takes to put one over her/his threshold. Without reducing that stress, it can be difficult to alleviate pain. As Foot & Ankle specialists, our job is addressing the lower extremity problem and possible causes. Regarding extra body weight, we encourage patients to discuss diet and weight  management plans with their primary care doctors. It is this team approach that gives you the best opportunity for pain relief and getting you back on your feet.      [facility] has a Comprehensive Weight Management Program. This program includes counseling, education, non-surgical and surgical approaches to weight loss. If you are interested in learning more either talk to you primary care provider or call [phone].

## 2020-01-29 NOTE — LETTER
"    1/29/2020         RE: Roxane Lopes  [address] [place] [phone]        Dear Colleague,    Thank you for referring your patient, Roxane Lopes, to the [facility]. Please see a copy of my visit note below.    PATIENT HISTORY:  Roxane Lopes is a 44 year old female who presents to clinic for follow up on R foot injuries.  Injury almost 5 months ago.  Pt reports letting her dog out and \"stepping down\" on uneven terrain, causing the injury.  She is in a boot, WB, pain improved.  No fevers, chills, new injury, calf pain.  Nonsmoker.  Works as a pharm tech.  Nondiabetic.  Denies related family hx.     EXAM:/83   Pulse 83   Ht 1.702 m (5' 7\")   Wt 93 kg (205 lb)   BMI 32.11 kg/m       General appearance: Patient is alert and fully cooperative with history & exam.  No sign of distress is noted during the visit.     Dermatologic: Skin is intact to R foot without significant lesions, rash or abrasion.  No paronychia or evidence of soft tissue infection is noted.     Vascular: DP & PT pulses are intact & regular on the R.  No significant edema or varicosities noted.  CFT and skin temperature are normal.     Neurologic: Lower extremity sensation is intact to light touch.  No evidence of weakness or contracture in the lower extremities.  No evidence of neuropathy.     Musculoskeletal: R foot mild pain along lateral column with palpation, but no heel or peroneal tendon pain noted.  Patient is in a boot.  No gross ankle deformity noted.  No foot or ankle joint effusion is noted.  No calf pain, redness, or swelling.  Muscle strength normal to all compartments of RLE.    Plain XRs of R foot also taken today, reviewed with pt.  Difficult to see any cuboid fx on plain film.     ASSESSMENT:   R foot cuboid impaction fx  R peroneus longus partial tear  R plantar fascia tear  --injuries are MR indicated     PLAN:  Reviewed patient's chart in epic.  Discussed condition and treatment " options including pros and cons.    Pt improved clinically.    OK to transition back to supportive shoes as tolerated.  Discussed a gradual protocol.  Superfeet inserts advised.  Pt declined PT.  Discussed home exercises.  F/u prn.    Wenceslao Townsend DPM, FACFAS    Weight management plan: Patient was referred to their PCP to discuss a diet and exercise plan.      Again, thank you for allowing me to participate in the care of your patient.        Sincerely,        Wenceslao Townsend DPM

## 2020-01-29 NOTE — PROGRESS NOTES
"PATIENT HISTORY:  Roxane Lopes is a 44 year old female who presents to clinic for follow up on R foot injuries.  Injury almost 5 months ago.  Pt reports letting her dog out and \"stepping down\" on uneven terrain, causing the injury.  She is in a boot, WB, pain improved.  No fevers, chills, new injury, calf pain.  Nonsmoker.  Works as a pharm tech.  Nondiabetic.  Denies related family hx.     EXAM:/83   Pulse 83   Ht 1.702 m (5' 7\")   Wt 93 kg (205 lb)   BMI 32.11 kg/m      General appearance: Patient is alert and fully cooperative with history & exam.  No sign of distress is noted during the visit.     Dermatologic: Skin is intact to R foot without significant lesions, rash or abrasion.  No paronychia or evidence of soft tissue infection is noted.     Vascular: DP & PT pulses are intact & regular on the R.  No significant edema or varicosities noted.  CFT and skin temperature are normal.     Neurologic: Lower extremity sensation is intact to light touch.  No evidence of weakness or contracture in the lower extremities.  No evidence of neuropathy.     Musculoskeletal: R foot mild pain along lateral column with palpation, but no heel or peroneal tendon pain noted.  Patient is in a boot.  No gross ankle deformity noted.  No foot or ankle joint effusion is noted.  No calf pain, redness, or swelling.  Muscle strength normal to all compartments of RLE.    Plain XRs of R foot also taken today, reviewed with pt.  Difficult to see any cuboid fx on plain film.     ASSESSMENT:   R foot cuboid impaction fx  R peroneus longus partial tear  R plantar fascia tear  --injuries are MR indicated     PLAN:  Reviewed patient's chart in epic.  Discussed condition and treatment options including pros and cons.    Pt improved clinically.    OK to transition back to supportive shoes as tolerated.  Discussed a gradual protocol.  Superfeet inserts advised.  Pt declined PT.  Discussed home exercises.  F/u prn.    Wenceslao Townsend, " DPM, FACFAS    Weight management plan: Patient was referred to their PCP to discuss a diet and exercise plan.

## 2020-02-23 ENCOUNTER — HEALTH MAINTENANCE LETTER (OUTPATIENT)
Age: 45
End: 2020-02-23

## 2020-04-13 DIAGNOSIS — M62.838 SPASM OF MUSCLE: ICD-10-CM

## 2020-04-13 RX ORDER — CYCLOBENZAPRINE HCL 10 MG
10 TABLET ORAL 3 TIMES DAILY PRN
Qty: 30 TABLET | Refills: 1 | Status: SHIPPED | OUTPATIENT
Start: 2020-04-13 | End: 2020-06-25

## 2020-04-13 NOTE — TELEPHONE ENCOUNTER
cyclobenzaprine (FLEXERIL) 10 MG tablet       Last Written Prescription Date:  12/10/19  Last Fill Quantity: 30,   # refills: 1  Last Office Visit: 12/10/19  Future Office visit:  No future appointments    Routing refill request to provider for review/approval because:  Drug not on the G, P or [facility] refill protocol or controlled substance      DANIS Silverio

## 2020-05-09 ENCOUNTER — VIRTUAL VISIT (OUTPATIENT)
Dept: FAMILY MEDICINE | Facility: OTHER | Age: 45
End: 2020-05-09

## 2020-05-09 NOTE — PROGRESS NOTES
"Date: 2020 15:35:32  Clinician: Michael Faust  Clinician NPI: [id number]  Patient: Roxane Lopes  Patient : 1975  Patient Address: [address] [place] [address]  Patient Phone: [phone]  Visit Protocol: URI  Patient Summary:  Roxane is a 45 year old ( : 1975 ) female who initiated a Visit for COVID-19 (Coronavirus) evaluation and screening. When asked the question \"Please sign me up to receive news, health information and promotions from [employer].\", Roxane responded \"No\".    Roxane states her symptoms started gradually 3-4 days ago.   Her symptoms consist of myalgia, a cough, a headache, malaise, wheezing, and chills. Roxane also feels feverish but was unable to measure her temperature.   Symptom details     Cough: Roxane coughs a few times an hour and her cough is not more bothersome at night. Phlegm does not come into her throat when she coughs. She does not believe her cough is caused by post-nasal drip.     Wheezing: Roxane has not ever been diagnosed with asthma. The wheezing does not interfere with her normal daily activities.    Headache: She states the headache is moderate (4-6 on a 10 point pain scale).      Roxane denies having rhinitis, facial pain or pressure, sore throat, nasal congestion, vomiting, nausea, teeth pain, ageusia, anosmia, diarrhea, ear pain, and enlarged lymph nodes. She also denies taking antibiotic medication for the symptoms, having a sinus infection within the past year, double sickening (worsening symptoms after initial improvement), and having recent facial or sinus surgery in the past 60 days.   Precipitating events  She has not recently been exposed to someone with influenza. Roxane has been in close contact with the following high risk individuals: people with asthma, heart disease or diabetes.   Pertinent COVID-19 (Coronavirus) information  Roxane either works or volunteers as a healthcare worker or a , or works or volunteers in a healthcare facility. 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   Roxane has not had a close contact with a laboratory-confirmed COVID-19 patient within 14 days of symptom onset. She also has not had a close contact with a suspected COVID-19 patient within 14 days of symptom onset.   Triage Point(s) temporarily suspended for COVID-19 (Coronavirus) screening  Roxane reported the following symptoms which were previously protocol referral points. These protocol referral points have temporarily been removed for purposes of COVID-19 (Coronavirus) screening.   Difficulty breathing even when resting and can only speak in phrase(s)   Pertinent medical history  Roxane does not get yeast infections when she takes antibiotics.   Roxane needs a return to work/school note.   Weight: 186 lbs   Roxane smokes or uses smokeless tobacco.   She denies pregnancy and denies breastfeeding. She is currently menstruating.   Weight: 186 lbs  A synchronous phone visit was initiated by the provider for the following reason: Questions regarding employer--likely needs employee occupational health notification for patient and for healthcare worker with whom she lives    MEDICATIONS: venlafaxine oral, ALLERGIES: NKDA  Clinician Response:  Dear Roxane,   Your symptoms show that you may have coronavirus (COVID-19). This illness can cause fever, cough and trouble breathing. Many people get a mild case and get better on their own. Some people can get very sick.   Will I be tested for COVID-19?  We would recommend you be tested for coronavirus. Here is how to get that scheduled:   Call [phone]. Tell them you were referred by [facility] to have a COVID-19 test. You will be scheduled at one of our [facility] testing locations (drive-up). Please have your [facility] visit information ready when you call including your visit ID number to verify you were referred.    How can I protect  others in the meantime?  First, stay home and away from others (self-isolate) until:   You've had no fever---and no medicine that reduces fever---for 3 full days (72 hours). And...    Your other symptoms have gotten better. For example, your cough or breathing has improved. And...    At least 10 days have passed since your symptoms started.   During this time:   Don't go to work, school or anywhere else.     Stay away from others in your home. No hugging, kissing or shaking hands.    Don't let anyone visit.    Cover your mouth and nose with a mask, tissue or wash cloth to avoid spreading germs.    Wash your hands and face often. Use soap and water.   How can I take care of myself?  1.Take Tylenol (acetaminophen) for fever or pain. If you have liver or kidney problems, ask your family doctor if it's okay to take Tylenol.   Adults can take either:    650 mg (two 325 mg pills) every 4 to 6 hours, or...    1,000 mg (two 500 mg pills) every 8 hours as needed.     Note: Don't take more than 3,000 mg in one day.   For children, check the Tylenol bottle for the right dose. The dose is based on the child's age or weight.  2.If you have other health problems (like cancer, heart failure, an organ transplant or severe kidney disease): Call your specialty clinic if you don't feel better in the next 2 days.  3.Know when to call 911: If your breathing is so bad that it keeps you from doing normal activities, call 911 or go to the emergency room. Tell them that you've been staying home and may have COVID-19.  4.Sign up for [employer]. We know it's scary to hear that you might have COVID-19. We want to track your symptoms to make sure you're okay over the next 2 weeks. Please look for an email from [employer]---this is a free, online program that we'll use to keep in touch. To sign up, follow the link in the email. Learn more at [email or web address].  Where can I get more information?  To learn more about COVID-19  and how to care for yourself at home, please visit the CDC website at https://www.cdc.gov/coronavirus/2019-ncov/about/steps-when-sick.html.  For more about your care at [facility], please visit [email or web address]     Diagnosis: Cough  Diagnosis ICD: R05  Triage Notes: I reviewed the patient's history, verified their identity, and explained the Visit process.    Patient does not live with healthcare provider--she was referring to herself as the healthcare provider.     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 Denies any concerns for emergent evaluation. She confirms she is able to do all ADL's despite acute illness sxs.  Synchronous Triage: phone, status: completed, duration: 375 seconds

## 2020-05-10 ENCOUNTER — OFFICE VISIT - HEALTHEAST (OUTPATIENT)
Dept: FAMILY MEDICINE | Facility: CLINIC | Age: 45
End: 2020-05-10

## 2020-05-10 DIAGNOSIS — Z20.822 SUSPECTED 2019 NOVEL CORONAVIRUS INFECTION: ICD-10-CM

## 2020-05-11 ENCOUNTER — COMMUNICATION - HEALTHEAST (OUTPATIENT)
Dept: FAMILY MEDICINE | Facility: CLINIC | Age: 45
End: 2020-05-11

## 2020-05-14 ENCOUNTER — MYC MEDICAL ADVICE (OUTPATIENT)
Dept: PEDIATRICS | Facility: CLINIC | Age: 45
End: 2020-05-14

## 2020-05-14 NOTE — TELEPHONE ENCOUNTER
Called patient and gave message per Adelaide Bledsoe NP  Patient verbalized understanding and agreement to plan.   Asked patient to call the ED to give them notice that she has Covid test pending. She agrees to plan.        Yola Andrews RN, [facility]

## 2020-05-14 NOTE — TELEPHONE ENCOUNTER
Called patient due to symptoms of severe headache.  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  She did covid testing on 5/10/2020, she had a virtual visit on 5/9/2020.    She states her symptoms had started 9 days ago, she has terrible headaches, lightheadedness, cough and SOB.    She does not sound distressed over the phone, she is drinking fluids well, she states at times her headache is the worst headache she has ever felt.  She gets relief with Tylenol or Ibuprofen for about 30 min then it comes back.   She denies blurred vision or photophobia.       Did inform pt that for worst headache ever, she should go to the hospital ED,  She agrees but is asking for anything else for pain relief for her headaches.    She is ok with detailed message or a MyChart message back.    Yola Andrews RN, [facility]

## 2020-06-22 DIAGNOSIS — M62.838 SPASM OF MUSCLE: ICD-10-CM

## 2020-06-22 NOTE — TELEPHONE ENCOUNTER
Cyclobenzaprine  Last Written Prescription Date:  4/13/2020  Last Fill Quantity: 30,  # refills: 1   Last office visit: 12/10/2019 with prescribing provider:  Adelaide Bledsoe CNP   Future Office Visit:

## 2020-06-25 RX ORDER — CYCLOBENZAPRINE HCL 10 MG
10 TABLET ORAL 3 TIMES DAILY PRN
Qty: 30 TABLET | Refills: 1 | Status: SHIPPED | OUTPATIENT
Start: 2020-06-25 | End: 2020-11-04

## 2020-06-25 NOTE — TELEPHONE ENCOUNTER
cyclobenzaprine (FLEXERIL) 10 MG tablet   Last Written Prescription Date:  4/13/2020  Last Fill Quantity: 30,   # refills: 1  Last Office Visit : 12/10/2019  Future Office visit:  None    Routing refill request to provider for review/approval because:  Drug not on the [facility], P or [facility] refill protocol or controlled substance    Trisha Williamson RN  [facility] T[facility] Red Flags/Med Refills

## 2020-11-02 DIAGNOSIS — M62.838 SPASM OF MUSCLE: ICD-10-CM

## 2020-11-04 RX ORDER — CYCLOBENZAPRINE HCL 10 MG
10 TABLET ORAL 3 TIMES DAILY PRN
Qty: 90 TABLET | Refills: 0 | Status: SHIPPED | OUTPATIENT
Start: 2020-11-04 | End: 2021-03-15

## 2020-11-04 NOTE — TELEPHONE ENCOUNTER
cyclobenzaprine (FLEXERIL) 10 MG tablet      Last Written Prescription Date:  6/25/2020  Last Fill Quantity: 30,   # refills: 1  Last Office Visit : 12/10/2019  Future Office visit:  None    Routing refill request to provider for review/approval because:  Medication not on the refill protocol.

## 2020-11-05 ENCOUNTER — OFFICE VISIT (OUTPATIENT)
Dept: FAMILY MEDICINE | Facility: CLINIC | Age: 45
End: 2020-11-05
Payer: COMMERCIAL

## 2020-11-05 VITALS
TEMPERATURE: 97.8 F | DIASTOLIC BLOOD PRESSURE: 104 MMHG | BODY MASS INDEX: 33.12 KG/M2 | OXYGEN SATURATION: 100 % | WEIGHT: 211 LBS | HEART RATE: 99 BPM | SYSTOLIC BLOOD PRESSURE: 148 MMHG | HEIGHT: 67 IN

## 2020-11-05 DIAGNOSIS — E66.811 CLASS 1 OBESITY DUE TO EXCESS CALORIES WITH SERIOUS COMORBIDITY AND BODY MASS INDEX (BMI) OF 33.0 TO 33.9 IN ADULT: ICD-10-CM

## 2020-11-05 DIAGNOSIS — R25.1 SHAKINESS: ICD-10-CM

## 2020-11-05 DIAGNOSIS — R06.83 SNORING: ICD-10-CM

## 2020-11-05 DIAGNOSIS — F41.8 DEPRESSION WITH ANXIETY: ICD-10-CM

## 2020-11-05 DIAGNOSIS — E66.09 CLASS 1 OBESITY DUE TO EXCESS CALORIES WITH SERIOUS COMORBIDITY AND BODY MASS INDEX (BMI) OF 33.0 TO 33.9 IN ADULT: ICD-10-CM

## 2020-11-05 DIAGNOSIS — R03.0 ELEVATED BLOOD PRESSURE READING WITHOUT DIAGNOSIS OF HYPERTENSION: Primary | ICD-10-CM

## 2020-11-05 LAB
ALBUMIN SERPL-MCNC: 3.9 G/DL (ref 3.4–5)
ALP SERPL-CCNC: 74 U/L (ref 40–150)
ALT SERPL W P-5'-P-CCNC: 20 U/L (ref 0–50)
ANION GAP SERPL CALCULATED.3IONS-SCNC: 4 MMOL/L (ref 3–14)
AST SERPL W P-5'-P-CCNC: 17 U/L (ref 0–45)
BILIRUB SERPL-MCNC: 0.5 MG/DL (ref 0.2–1.3)
BUN SERPL-MCNC: 7 MG/DL (ref 7–30)
CALCIUM SERPL-MCNC: 8.6 MG/DL (ref 8.5–10.1)
CHLORIDE SERPL-SCNC: 105 MMOL/L (ref 94–109)
CHOLEST SERPL-MCNC: 203 MG/DL
CO2 SERPL-SCNC: 27 MMOL/L (ref 20–32)
CREAT SERPL-MCNC: 0.58 MG/DL (ref 0.52–1.04)
CREAT UR-MCNC: 149 MG/DL
ERYTHROCYTE [DISTWIDTH] IN BLOOD BY AUTOMATED COUNT: 13.6 % (ref 10–15)
GFR SERPL CREATININE-BSD FRML MDRD: >90 ML/MIN/{1.73_M2}
GLUCOSE SERPL-MCNC: 88 MG/DL (ref 70–99)
HBA1C MFR BLD: 4.9 % (ref 0–5.6)
HCT VFR BLD AUTO: 38.5 % (ref 35–47)
HDLC SERPL-MCNC: 59 MG/DL
HGB BLD-MCNC: 13 G/DL (ref 11.7–15.7)
LDLC SERPL CALC-MCNC: 130 MG/DL
MCH RBC QN AUTO: 31.2 PG (ref 26.5–33)
MCHC RBC AUTO-ENTMCNC: 33.8 G/DL (ref 31.5–36.5)
MCV RBC AUTO: 92 FL (ref 78–100)
MICROALBUMIN UR-MCNC: 21 MG/L
MICROALBUMIN/CREAT UR: 14.3 MG/G CR (ref 0–25)
NONHDLC SERPL-MCNC: 144 MG/DL
PLATELET # BLD AUTO: 332 10E9/L (ref 150–450)
POTASSIUM SERPL-SCNC: 3.6 MMOL/L (ref 3.4–5.3)
PROT SERPL-MCNC: 7.4 G/DL (ref 6.8–8.8)
RBC # BLD AUTO: 4.17 10E12/L (ref 3.8–5.2)
SODIUM SERPL-SCNC: 136 MMOL/L (ref 133–144)
TRIGL SERPL-MCNC: 71 MG/DL
TSH SERPL DL<=0.005 MIU/L-ACNC: 0.76 MU/L (ref 0.4–4)
WBC # BLD AUTO: 4.9 10E9/L (ref 4–11)

## 2020-11-05 PROCEDURE — 84443 ASSAY THYROID STIM HORMONE: CPT | Performed by: NURSE PRACTITIONER

## 2020-11-05 PROCEDURE — 80053 COMPREHEN METABOLIC PANEL: CPT | Performed by: NURSE PRACTITIONER

## 2020-11-05 PROCEDURE — 80061 LIPID PANEL: CPT | Performed by: NURSE PRACTITIONER

## 2020-11-05 PROCEDURE — 82043 UR ALBUMIN QUANTITATIVE: CPT | Performed by: NURSE PRACTITIONER

## 2020-11-05 PROCEDURE — 99214 OFFICE O/P EST MOD 30 MIN: CPT | Performed by: NURSE PRACTITIONER

## 2020-11-05 PROCEDURE — 85027 COMPLETE CBC AUTOMATED: CPT | Performed by: NURSE PRACTITIONER

## 2020-11-05 PROCEDURE — 83036 HEMOGLOBIN GLYCOSYLATED A1C: CPT | Performed by: NURSE PRACTITIONER

## 2020-11-05 PROCEDURE — 36415 COLL VENOUS BLD VENIPUNCTURE: CPT | Performed by: NURSE PRACTITIONER

## 2020-11-05 RX ORDER — LISINOPRIL 10 MG/1
10 TABLET ORAL DAILY
Qty: 30 TABLET | Refills: 0 | Status: SHIPPED | OUTPATIENT
Start: 2020-11-05 | End: 2020-12-01

## 2020-11-05 RX ORDER — VENLAFAXINE HYDROCHLORIDE 150 MG/1
150 CAPSULE, EXTENDED RELEASE ORAL DAILY
Qty: 90 CAPSULE | Refills: 3 | Status: SHIPPED | OUTPATIENT
Start: 2020-11-05 | End: 2021-11-23

## 2020-11-05 ASSESSMENT — MIFFLIN-ST. JEOR: SCORE: 1644.72

## 2020-11-05 ASSESSMENT — ANXIETY QUESTIONNAIRES
5. BEING SO RESTLESS THAT IT IS HARD TO SIT STILL: SEVERAL DAYS
GAD7 TOTAL SCORE: 9
IF YOU CHECKED OFF ANY PROBLEMS ON THIS QUESTIONNAIRE, HOW DIFFICULT HAVE THESE PROBLEMS MADE IT FOR YOU TO DO YOUR WORK, TAKE CARE OF THINGS AT HOME, OR GET ALONG WITH OTHER PEOPLE: SOMEWHAT DIFFICULT
3. WORRYING TOO MUCH ABOUT DIFFERENT THINGS: SEVERAL DAYS
6. BECOMING EASILY ANNOYED OR IRRITABLE: MORE THAN HALF THE DAYS
2. NOT BEING ABLE TO STOP OR CONTROL WORRYING: SEVERAL DAYS
1. FEELING NERVOUS, ANXIOUS, OR ON EDGE: SEVERAL DAYS
7. FEELING AFRAID AS IF SOMETHING AWFUL MIGHT HAPPEN: SEVERAL DAYS

## 2020-11-05 ASSESSMENT — PATIENT HEALTH QUESTIONNAIRE - PHQ9
5. POOR APPETITE OR OVEREATING: MORE THAN HALF THE DAYS
SUM OF ALL RESPONSES TO PHQ QUESTIONS 1-9: 15

## 2020-11-05 NOTE — PATIENT INSTRUCTIONS
Decrease salt intake to 2 gm or less daily  Increase water intake to 6-8 glasses of water  Speed walk for 10-15 minutes 1-2 times a week on treadmill. Slowly increase time and how many days

## 2020-11-05 NOTE — PROGRESS NOTES
Subjective     Roxane Lopes is a 45 year old female who presents to clinic today for the following health issues:    HPI       Patient would also like A1C checked, fasting.      Hypertension Initial       Do you check your blood pressure regularly outside of the clinic? No     Are you following a low salt diet? No    Are your blood pressures ever more than 140 on the top number (systolic) OR more   than 90 on the bottom number (diastolic), for example 140/90? Yes, patient went to Dentist 10/13/2020and BP was elevated. 160/100. Decided to follow-up with primary care.     Saw podiatry in person in January 2020, BP was 138/83. Loves salt, states she eats whatever she wants for a diet. No exercise. Walks a lot at work and outside. Smokes e-cigarettes for years-smoking it a few puffs throughout the day.  Advised that she should cut back or quit.  States this is less than she used to.  Feels it is close to about 4 cigarettes a day. No alcohol. no drugs.  Not big into sweets. Caffeine: pop-44oz a day and coffee 2 24oz a day and water-2-3 bottles of water a day too. Sleep is okay. Her significant other says she snores bad. She admits she doesn't feel rested in the morning. Never screened/tested for sleep apnea.  We talked about how sleep apnea can sometimes affect the blood pressure as can make drinking too much caffeine.    Felt shaky this summer, her  wondered if it was food related to low blood sugar, so he gave her candy, and felt better 20 min later. Was clammy, tingling, no dizziness but lightheaded.  Will occurred again a few days ago. Also got bad headaches. Unsure if related to shaky stuff.  Unsure about diabetes but has some HTN/hyperlipidemia in family.     Taking ibuprofen about 800 mg few times a day for headaches for a few months.  We talked about it was a potential that her headaches are coming from her blood pressure.  Advised to monitor for any dizziness when she takes the blood pressure medicine.   "Can try taking in the evening if it does cause dizziness.    Mood: venlafaxine working well aside from above issues.     Used to have DVT in left leg, gets itchy and dry at times.   Slight leg swelling more by the end of the day    Review of Systems   Constitutional, HEENT, cardiovascular, pulmonary, gi and gu systems are negative, except as otherwise noted.      Objective    BP (!) 157/103 (BP Location: Left arm, Patient Position: Chair, Cuff Size: Adult Large)   Pulse 102   Temp 97.8  F (36.6  C) (Oral)   Ht 1.702 m (5' 7\")   Wt 95.7 kg (211 lb)   SpO2 100%   BMI 33.05 kg/m    Body mass index is 33.05 kg/m .  Physical Exam   GENERAL: healthy, alert and no distress  EYES: Eyes grossly normal to inspection, PERRL and conjunctivae and sclerae normal  HENT: ear canals and TM's normal, nose and mouth without ulcers or lesions  NECK: no adenopathy, no asymmetry, masses, or scars and thyroid normal to palpation  RESP: lungs clear to auscultation - no rales, rhonchi or wheezes  CV: regular rate and rhythm, normal S1 S2, no S3 or S4, no murmur, click or rub, trace nonpitting edema in bilateral lower ankles  ABDOMEN: soft, overweight, nontender, no hepatosplenomegaly, no masses and bowel sounds normal  MS: no gross musculoskeletal defects noted    Results for orders placed or performed in visit on 11/05/20 (from the past 24 hour(s))   Hemoglobin A1c   Result Value Ref Range    Hemoglobin A1C 4.9 0 - 5.6 %   Comprehensive metabolic panel (BMP + Alb, Alk Phos, ALT, AST, Total. Bili, TP)   Result Value Ref Range    Sodium 136 133 - 144 mmol/L    Potassium 3.6 3.4 - 5.3 mmol/L    Chloride 105 94 - 109 mmol/L    Carbon Dioxide 27 20 - 32 mmol/L    Anion Gap 4 3 - 14 mmol/L    Glucose 88 70 - 99 mg/dL    Urea Nitrogen 7 7 - 30 mg/dL    Creatinine 0.58 0.52 - 1.04 mg/dL    GFR Estimate >90 >60 mL/min/[1.73_m2]    GFR Estimate If Black >90 >60 mL/min/[1.73_m2]    Calcium 8.6 8.5 - 10.1 mg/dL    Bilirubin Total 0.5 0.2 - 1.3 " mg/dL    Albumin 3.9 3.4 - 5.0 g/dL    Protein Total 7.4 6.8 - 8.8 g/dL    Alkaline Phosphatase 74 40 - 150 U/L    ALT 20 0 - 50 U/L    AST 17 0 - 45 U/L   Lipid panel reflex to direct LDL Fasting   Result Value Ref Range    Cholesterol 203 (H) <200 mg/dL    Triglycerides 71 <150 mg/dL    HDL Cholesterol 59 >49 mg/dL    LDL Cholesterol Calculated 130 (H) <100 mg/dL    Non HDL Cholesterol 144 (H) <130 mg/dL   CBC with platelets   Result Value Ref Range    WBC 4.9 4.0 - 11.0 10e9/L    RBC Count 4.17 3.8 - 5.2 10e12/L    Hemoglobin 13.0 11.7 - 15.7 g/dL    Hematocrit 38.5 35.0 - 47.0 %    MCV 92 78 - 100 fl    MCH 31.2 26.5 - 33.0 pg    MCHC 33.8 31.5 - 36.5 g/dL    RDW 13.6 10.0 - 15.0 %    Platelet Count 332 150 - 450 10e9/L   TSH with free T4 reflex   Result Value Ref Range    TSH 0.76 0.40 - 4.00 mU/L           Assessment & Plan     Elevated blood pressure reading without diagnosis of hypertension  Blood pressure quite high today.  Unknown cause.  Multiple factors including obesity, possible sleep apnea, high salt diet, lack of exercise.    - Comprehensive metabolic panel (BMP + Alb, Alk Phos, ALT, AST, Total. Bili, TP)  - Lipid panel reflex to direct LDL Fasting  - CBC with platelets  - TSH with free T4 reflex  - Home Blood Pressure Monitor Order for DME - ONLY FOR DME  - SLEEP EVALUATION & MANAGEMENT REFERRAL - ADULT -[facility] - [place]  [phone] (Age 15 and up); Future  - lisinopril (ZESTRIL) 10 MG tablet; Take 1 tablet (10 mg) by mouth daily  - Albumin Random Urine Quantitative with Creat Ratio    Depression with anxiety  Stable refilled  - venlafaxine (EFFEXOR-XR) 150 MG 24 hr capsule; Take 1 capsule (150 mg) by mouth daily    Shakiness  Screening.  She can discuss occurred twice, seems to have improved with eating candy  - Hemoglobin A1c    Class 1 obesity due to excess calories with serious comorbidity and body mass index (BMI) of 33.0 to 33.9 in adult  Advised she does need to work  "on healthy diet and exercise, will start with low-salt diet and walking 1 to 2 days a week    Snoring  Would recommend getting done in the next couple months  - SLEEP EVALUATION & MANAGEMENT REFERRAL - ADULT -[facility] [place]  [phone] (Age 15 and up); Future     BMI:   Estimated body mass index is 33.05 kg/m  as calculated from the following:    Height as of this encounter: 1.702 m (5' 7\").    Weight as of this encounter: 95.7 kg (211 lb).   Weight management plan: Discussed healthy diet and exercise guidelines      Return in about 4 weeks (around 12/3/2020) for Routine Visit, Hypertension Recheck.  Or sooner pending lab results    MEENAKSHI Ureña, NP-C  [facility]      "

## 2020-11-05 NOTE — RESULT ENCOUNTER NOTE
En Betts,    Your kidney and liver function are normal.  Your total cholesterol slightly elevated as is your LDL, which is your bad cholesterol.  Your triglycerides and HDL cholesterol are normal.  Your cholesterol isn't so high that I think it would be the cause of your high blood pressure.  Make sure you are checking your blood pressure at least a couple times a week over the next couple of weeks, if you do not see improving please let me know right away.  Otherwise  follow-up with me in a few weeks.  Thank you,  MEENAKSHI Ureña, NP-C  [facility]

## 2020-11-06 ASSESSMENT — ANXIETY QUESTIONNAIRES: GAD7 TOTAL SCORE: 9

## 2020-11-30 DIAGNOSIS — R03.0 ELEVATED BLOOD PRESSURE READING WITHOUT DIAGNOSIS OF HYPERTENSION: ICD-10-CM

## 2020-12-01 RX ORDER — LISINOPRIL 10 MG/1
10 TABLET ORAL DAILY
Qty: 15 TABLET | Refills: 0 | Status: SHIPPED | OUTPATIENT
Start: 2020-12-01 | End: 2020-12-17 | Stop reason: SINTOL

## 2020-12-01 NOTE — TELEPHONE ENCOUNTER
Routing refill request to provider for review/approval because:  BP Readings from Last 3 Encounters:   11/05/20 (!) 148/104   01/29/20 138/83   12/30/19 118/82     Salina De La Paz BSN, RN

## 2020-12-01 NOTE — TELEPHONE ENCOUNTER
Small refill sent, due for follow up visit for refills. Please call or send [employer] message so she is aware.   Thank you,  MEENAKSHI Ureña, NP-C  [facility]

## 2020-12-17 ENCOUNTER — OFFICE VISIT (OUTPATIENT)
Dept: FAMILY MEDICINE | Facility: CLINIC | Age: 45
End: 2020-12-17
Payer: COMMERCIAL

## 2020-12-17 VITALS
HEIGHT: 67 IN | SYSTOLIC BLOOD PRESSURE: 125 MMHG | TEMPERATURE: 98.1 F | HEART RATE: 94 BPM | BODY MASS INDEX: 32.96 KG/M2 | OXYGEN SATURATION: 99 % | WEIGHT: 210 LBS | DIASTOLIC BLOOD PRESSURE: 84 MMHG

## 2020-12-17 DIAGNOSIS — Z12.4 SCREENING FOR MALIGNANT NEOPLASM OF CERVIX: ICD-10-CM

## 2020-12-17 DIAGNOSIS — I10 ESSENTIAL HYPERTENSION: Primary | ICD-10-CM

## 2020-12-17 DIAGNOSIS — F41.9 ANXIETY: ICD-10-CM

## 2020-12-17 PROCEDURE — G0145 SCR C/V CYTO,THINLAYER,RESCR: HCPCS | Performed by: NURSE PRACTITIONER

## 2020-12-17 PROCEDURE — 99214 OFFICE O/P EST MOD 30 MIN: CPT | Performed by: NURSE PRACTITIONER

## 2020-12-17 PROCEDURE — 87624 HPV HI-RISK TYP POOLED RSLT: CPT | Performed by: NURSE PRACTITIONER

## 2020-12-17 RX ORDER — LOSARTAN POTASSIUM 25 MG/1
25 TABLET ORAL DAILY
Qty: 60 TABLET | Refills: 0 | Status: SHIPPED | OUTPATIENT
Start: 2020-12-17 | End: 2021-02-15

## 2020-12-17 RX ORDER — HYDROXYZINE HYDROCHLORIDE 25 MG/1
TABLET, FILM COATED ORAL
Qty: 90 TABLET | Refills: 1 | Status: SHIPPED | OUTPATIENT
Start: 2020-12-17 | End: 2021-12-20

## 2020-12-17 ASSESSMENT — MIFFLIN-ST. JEOR: SCORE: 1640.18

## 2020-12-17 NOTE — PROGRESS NOTES
"Subjective     Roxane Lopes is a 45 year old female who presents to clinic today for the following health issues:    HPI         Chest Pain  Onset/Duration: 2 weeks   Description:   Location: entire chest, patient believes pain might be related to anxiety   Character: Feels like someone is poking patient hard in the chest   Radiation: None   Duration: Pain will last about 1 minute then go away   Intensity: mild, moderate  Progression of Symptoms: Becoming more frequent   Accompanying Signs & Symptoms:  Shortness of breath: no  Sweating: YES  Nausea/vomiting: YES  Lightheadedness: no  Palpitations: no  Fever/Chills: no  Cough: YES- patient believes might be caused by lisinopril            Heartburn: Patient does have GERD but takes Tums for acid reflux   History:   Family history of heart disease: no  Tobacco use: YES- E Cig   Previous similar symptoms: no   Precipitating factors:   Worse with exertion: Worsens with anxiety   Worse with deep breaths: YES           Related to eating: no           Better with burping: no  Alleviating factors: Tums for heart burn   Therapies tried and outcome: None     Feels chest pain may be related to anxiety. As she gets anxious, feels it bilateral in her chest and a poking sensation. It will come and go. More when she is worked up or upset.     Snoring is better. But not sleeping well, mind is racing. Trouble relaxing. Lots of stress, gets shaking. Eating is okay. 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     BP: lisinopril giving her bad dry cough. But headaches and lightheadness are gone.   BP cuff is not working well. She needs to return it.           Review of Systems cardiovascular-as above,  Pulmonary-as above, gi and gu systems are negative, except as otherwise noted.      Objective    /84 (BP Location: Left arm, Patient Position: Chair, Cuff Size: Adult Regular)   Pulse 94   Temp 98.1  F (36.7  C) (Oral)   Ht 1.702 m (5' 7\")   Wt 95.3 kg (210 lb)   SpO2 99%   " Breastfeeding No   BMI 32.89 kg/m    Body mass index is 32.89 kg/m .  Physical Exam   GENERAL: healthy, alert and no distress  RESP: lungs clear to auscultation - no rales, rhonchi or wheezes  CV: regular rate and rhythm, normal S1 S2, no S3 or S4, no murmur, click or rub, no peripheral edema   (female): normal female external genitalia, normal urethral meatus, vaginal mucosa, normal cervix/adnexa/uterus without masses , slight white discharge  MS: no gross musculoskeletal defects noted    No results found for this or any previous visit (from the past 24 hour(s)).        Assessment & Plan     Essential hypertension  Stop lisinopril, start losartan. Follow up with provider in 4 weeks, virtual okay  - losartan (COZAAR) 25 MG tablet; Take 1 tablet (25 mg) by mouth daily    Screening for malignant neoplasm of cervix  Screening done  - Pap imaged thin layer screen with HPV - recommended age 30 - 65 years (select HPV order below)    Anxiety  Chest pain likely related to anxiety, trial this, but if not improving needs to return in person for further work up  - hydrOXYzine (ATARAX) 25 MG tablet; Take 25 mg 2-3 times a day as needed for anxiety. **caution may cause sedation          Return in about 4 weeks (around 1/14/2021) for Phone or Video visit okay.    MEENAKSHI Ureña, NP-C  [facility]

## 2020-12-21 LAB
COPATH REPORT: NORMAL
PAP: NORMAL

## 2020-12-23 LAB
FINAL DIAGNOSIS: NORMAL
HPV HR 12 DNA CVX QL NAA+PROBE: NEGATIVE
HPV16 DNA SPEC QL NAA+PROBE: NEGATIVE
HPV18 DNA SPEC QL NAA+PROBE: NEGATIVE
SPECIMEN DESCRIPTION: NORMAL
SPECIMEN SOURCE CVX/VAG CYTO: NORMAL

## 2021-02-10 ENCOUNTER — OFFICE VISIT (OUTPATIENT)
Dept: URGENT CARE | Facility: URGENT CARE | Age: 46
End: 2021-02-10
Payer: COMMERCIAL

## 2021-02-10 VITALS
DIASTOLIC BLOOD PRESSURE: 88 MMHG | OXYGEN SATURATION: 97 % | SYSTOLIC BLOOD PRESSURE: 143 MMHG | HEART RATE: 97 BPM | TEMPERATURE: 98.2 F | RESPIRATION RATE: 24 BRPM | WEIGHT: 210 LBS | BODY MASS INDEX: 32.89 KG/M2

## 2021-02-10 DIAGNOSIS — R51.9 ACUTE NONINTRACTABLE HEADACHE, UNSPECIFIED HEADACHE TYPE: ICD-10-CM

## 2021-02-10 DIAGNOSIS — R07.0 THROAT PAIN: Primary | ICD-10-CM

## 2021-02-10 DIAGNOSIS — M79.10 MYALGIA: ICD-10-CM

## 2021-02-10 DIAGNOSIS — H92.01 RIGHT EAR PAIN: ICD-10-CM

## 2021-02-10 LAB
DEPRECATED S PYO AG THROAT QL EIA: NEGATIVE
SARS-COV-2 RNA RESP QL NAA+PROBE: NORMAL
SPECIMEN SOURCE: NORMAL
STREP GROUP A PCR: NOT DETECTED

## 2021-02-10 PROCEDURE — 99N1174 PR STATISTIC STREP A RAPID: Performed by: PHYSICIAN ASSISTANT

## 2021-02-10 PROCEDURE — U0005 INFEC AGEN DETEC AMPLI PROBE: HCPCS | Performed by: PHYSICIAN ASSISTANT

## 2021-02-10 PROCEDURE — 99213 OFFICE O/P EST LOW 20 MIN: CPT | Performed by: PHYSICIAN ASSISTANT

## 2021-02-10 PROCEDURE — U0003 INFECTIOUS AGENT DETECTION BY NUCLEIC ACID (DNA OR RNA); SEVERE ACUTE RESPIRATORY SYNDROME CORONAVIRUS 2 (SARS-COV-2) (CORONAVIRUS DISEASE [COVID-19]), AMPLIFIED PROBE TECHNIQUE, MAKING USE OF HIGH THROUGHPUT TECHNOLOGIES AS DESCRIBED BY CMS-2020-01-R: HCPCS | Performed by: PHYSICIAN ASSISTANT

## 2021-02-10 PROCEDURE — 87651 STREP A DNA AMP PROBE: CPT | Performed by: PHYSICIAN ASSISTANT

## 2021-02-10 NOTE — PATIENT INSTRUCTIONS
"Discharge Instructions for COVID-19 Patients  You have--or may have--COVID-19. Please follow the instructions listed below.   If you have a weakened immune system, discuss with your doctor any other actions you need to take.  How can I protect others?  If you have symptoms (fever, cough, body aches or trouble breathing):    Stay home and away from others (self-isolate) until:  ? Your other symptoms have resolved (gotten better). And   ? You've had no fever--and no medicine that reduces fever--for 1 full day (24 hours). And   ? At least 10 days have passed since your symptoms started. (You may need to wait 20 days. Follow the advice of your care team.)  If you don't show symptoms, but testing showed that you have COVID-19:    Stay home and away from others (self-isolate) until at least 10 days have passed since the date of your first positive COVID-19 test.  During this time    Stay in your own room, even for meals. Use your own bathroom if you can.    Stay away from others in your home. No hugging, kissing or shaking hands. No visitors.    Don't go to work, school or anywhere else.    Clean \"high touch\" surfaces often (doorknobs, counters, handles). Use household cleaning spray or wipes.    You'll find a full list of  on the EPA website: www.epa.gov/pesticide-registration/list-n-disinfectants-use-against-sars-cov-2.    Cover your mouth and nose with a mask or other face covering to avoid spreading germs.    Wash your hands and face often. Use soap and water.    Caregivers in these groups are at risk for severe illness due to COVID-19:  ? People 65 years and older  ? People who live in a nursing home or long-term care facility  ? People with chronic disease (lung, heart, cancer, diabetes, kidney, liver, immunologic)  ? People who have a weakened immune system, including those who:    Are in cancer treatment    Take medicine that weakens the immune system, such as corticosteroids    Had a bone marrow or organ " transplant    Have an immune deficiency    Have poorly controlled HIV or AIDS    Are obese (body mass index of 40 or higher)    Smoke regularly    Caregivers should wear gloves while washing dishes, handling laundry and cleaning bedrooms and bathrooms.    Use caution when washing and drying laundry: Don't shake dirty laundry and use the warmest water setting that you can.    For more tips on managing your health at home, go to www.cdc.gov/coronavirus/2019-ncov/downloads/10Things.pdf.  How can I take care of myself at home?  1. Get lots of rest. Drink extra fluids (unless a doctor has told you not to).  2. Take Tylenol (acetaminophen) for fever or pain. If you have liver or kidney problems, ask your family doctor if it's okay to take Tylenol.   Adults can take either:   ? 650 mg (two 325 mg pills) every 4 to 6 hours, or   ? 1,000 mg (two 500 mg pills) every 8 hours as needed.  ? Note: Don't take more than 3,000 mg in one day. Acetaminophen is found in many medicines (both prescribed and over-the-counter medicines). Read all labels to be sure you don't take too much.   For children, check the Tylenol bottle for the right dose. The dose is based on the child's age or weight.  3. If you have other health problems (like cancer, heart failure, an organ transplant or severe kidney disease): Call your specialty clinic if you don't feel better in the next 2 days.  4. Know when to call 911. Emergency warning signs include:  ? Trouble breathing or shortness of breath  ? Pain or pressure in the chest that doesn't go away  ? Feeling confused like you haven't felt before, or not being able to wake up  ? Bluish-colored lips or face  5. Your doctor may have prescribed a blood thinner medicine. Follow their instructions.  Where can I get more information?     [employer] - About COVID-19:   [email or web address]    CDC - What to Do If You're Sick:  www.cdc.gov/coronavirus/2019-ncov/about/steps-when-sick.html    CDC - Ending Home Isolation: www.cdc.gov/coronavirus/2019-ncov/hcp/disposition-in-home-patients.html    CDC - Caring for Someone: www.cdc.gov/coronavirus/2019-ncov/if-you-are-sick/care-for-someone.html    [facility] - Interim Guidance for Hospital Discharge to Home: www.health.[facility].[place]./diseases/coronavirus/hcp/hospdischarge.pdf    Below are the COVID-19 hotlines at the [place] Department of Health ([facility]). Interpreters are available.  ? For health questions: Call 798-652-5708 or 1-728.431.3960 (7 a.m. to 7 p.m.)  ? For questions about schools and childcare: Call 110-225-1140 or 1-221.806.5282 (7 a.m. to 7 p.m.)    For informational purposes only. Not to replace the advice of your health care provider. Clinically reviewed by Dr. Patrick Medina.   Copyright   2020 [facility]. All rights reserved. [employer] [id number] - REV 01/05/21.

## 2021-02-10 NOTE — PROGRESS NOTES
Chief Complaint   Patient presents with     Pharyngitis     Sore throat, headache and bodyache for about 5 days. Also having pain in lungs.              Differential Diagnosis:  URI Adult/Peds:  Acute right otitis media, Laryngitis, Otitis externa, Peritonsillar abscess, Sinusitis, Strep pharyngitis and Viral syndrome, TMJ      .[email or web address]      ASSESSMENT:     ICD-10-CM    1. Throat pain  R07.0 Streptococcus A Rapid Scr w Reflx to PCR     Group A Streptococcus PCR Throat Swab     Symptomatic COVID-19 Virus (Coronavirus) by PCR   2. Myalgia  M79.10    3. Right ear pain  H92.01    4. Acute nonintractable headache, unspecified headache type  R51.9            PLAN: Likely viral etiology.  Further strep test is pending.  We will repeat Covid test here today with the symptoms she has been having and in view of the fact that her daughter works in a nursing home with lots of exposure to Covid.  I have discussed clinical findings with patient.  Symptomatic care is discussed.  I have discussed the possibility of  worsening symptoms and indication to RTC or go to the ER if they occur.  All questions are answered, patient indicates understanding of these issues and is in agreement with plan.   Patient care instructions are discussed/given at the end of visit.   Lots of rest and fluids.      Nanette Card PA-C      SUBJECTIVE:  Roxane is a 45-year-old female who presents with right ear pain and sore throat for 5 days.  Some diarrhea yesterday.  No rash.  Had a negative Covid test through work 4 days ago.  Results came back negative today.  Daughter works in a nursing home.  She had Covid and she is exposed to many people with Covid.  She saw her daughter about 5 days ago.  Has a history of TMJ but this feels different.  Lots of body aches.      Allergies   Allergen Reactions     Chantix [Varenicline Tartrate] Anxiety       Past Medical History:   Diagnosis Date     Depression      DVT (deep venous thrombosis) (H) 4/2011    left  leg, was on lovenox and coumadin but have been off since 2011     H/O tubal ligation           Prediabetes             cyclobenzaprine (FLEXERIL) 10 MG tablet, Take 1 tablet (10 mg) by mouth 3 times daily as needed for muscle spasms       hydrOXYzine (ATARAX) 25 MG tablet, Take 25 mg 2-3 times a day as needed for anxiety. **caution may cause sedation       ibuprofen (ADVIL/MOTRIN) 800 MG tablet, Take 1 tablet (800 mg) by mouth every 8 hours as needed for moderate pain       losartan (COZAAR) 25 MG tablet, Take 1 tablet (25 mg) by mouth daily       order for DME, rollabout  3 month duration       order for DME, Medium short Airc[employer] boot       venlafaxine (EFFEXOR-XR) 150 MG 24 hr capsule, Take 1 capsule (150 mg) by mouth daily    No current facility-administered medications on file prior to visit.       Social History     Tobacco Use     Smoking status: Former Smoker     Packs/day: 0.50     Years: 10.00     Pack years: 5.00     Types: Cigarettes     Quit date: 10/16/2012     Years since quittin.3     Smokeless tobacco: Never Used     Tobacco comment: on e cigs now trying to quit   Substance Use Topics     Alcohol use: Yes     Comment: Maybe once a year       ROS:  CONSTITUTIONAL: Negative for fatigue or fever.  EYES: Negative for eye problems.  ENT: As above.  RESP: As above.  CV: Negative for chest pains.  GI: Negative for vomiting.  MUSCULOSKELETAL:  Negative for significant muscle or joint pains.  NEUROLOGIC: Negative for headaches.  SKIN: Negative for rash.  PSYCH: Normal mentation for age.    OBJECTIVE:  BP (!) 143/88 (BP Location: Left arm, Patient Position: Sitting, Cuff Size: Adult Regular)   Pulse 97   Temp 98.2  F (36.8  C) (Oral)   Resp 24   Wt 95.3 kg (210 lb)   LMP 2021   SpO2 97%   Breastfeeding No   BMI 32.89 kg/m    GENERAL APPEARANCE: Healthy, alert and no distress.  EYES:Conjunctiva/sclera clear.  EARS: No cerumen.   Ear canals w/o erythema.  TM's intact w/o erythema.     SINUSES: No maxillary sinus tenderness.  THROAT: Moderate erythema w/o tonsillar enlargement . No exudates.  NECK: Supple, nontender, no lymphadenopathy.  RESP: Lungs clear to auscultation - no rales, rhonchi or wheezes  CV: Regular rate and rhythm, normal S1 S2, no murmur noted.  NEURO: Awake, alert    SKIN: No rashes      Nanette Card PA-C     no

## 2021-02-11 LAB
LABORATORY COMMENT REPORT: NORMAL
SARS-COV-2 RNA RESP QL NAA+PROBE: NEGATIVE
SPECIMEN SOURCE: NORMAL

## 2021-02-13 DIAGNOSIS — I10 ESSENTIAL HYPERTENSION: ICD-10-CM

## 2021-02-15 RX ORDER — LOSARTAN POTASSIUM 25 MG/1
25 TABLET ORAL DAILY
Qty: 60 TABLET | Refills: 0 | Status: SHIPPED | OUTPATIENT
Start: 2021-02-15 | End: 2021-04-29

## 2021-02-15 NOTE — TELEPHONE ENCOUNTER
BP Readings from Last 6 Encounters:   02/10/21 (!) 143/88   12/17/20 125/84   11/05/20 (!) 148/104   01/29/20 138/83   12/30/19 118/82   12/10/19 130/80     Refill completed.   Please have patient make follow up appointment regarding her blood pressure

## 2021-02-15 NOTE — TELEPHONE ENCOUNTER
Routing refill request to provider for review/approval because:  BP is out of range.          Dl Patrick RN, BSN, PHN

## 2021-03-14 DIAGNOSIS — M62.838 SPASM OF MUSCLE: ICD-10-CM

## 2021-03-15 RX ORDER — CYCLOBENZAPRINE HCL 10 MG
TABLET ORAL
Qty: 90 TABLET | Refills: 0 | Status: SHIPPED | OUTPATIENT
Start: 2021-03-15 | End: 2021-05-09

## 2021-03-15 NOTE — TELEPHONE ENCOUNTER
Routing refill request to provider for review/approval because:  Drug not on the [facility] refill protocol     Fabiana NEGRONN, RN

## 2021-03-17 ENCOUNTER — OFFICE VISIT (OUTPATIENT)
Dept: URGENT CARE | Facility: URGENT CARE | Age: 46
End: 2021-03-17
Payer: COMMERCIAL

## 2021-03-17 ENCOUNTER — ANCILLARY PROCEDURE (OUTPATIENT)
Dept: GENERAL RADIOLOGY | Facility: CLINIC | Age: 46
End: 2021-03-17
Attending: NURSE PRACTITIONER
Payer: COMMERCIAL

## 2021-03-17 VITALS
RESPIRATION RATE: 16 BRPM | BODY MASS INDEX: 33.36 KG/M2 | DIASTOLIC BLOOD PRESSURE: 89 MMHG | OXYGEN SATURATION: 100 % | TEMPERATURE: 98.1 F | SYSTOLIC BLOOD PRESSURE: 144 MMHG | HEART RATE: 110 BPM | WEIGHT: 213 LBS

## 2021-03-17 DIAGNOSIS — M51.369 DDD (DEGENERATIVE DISC DISEASE), LUMBAR: ICD-10-CM

## 2021-03-17 DIAGNOSIS — M53.3 TAIL BONE PAIN: Primary | ICD-10-CM

## 2021-03-17 PROCEDURE — 72100 X-RAY EXAM L-S SPINE 2/3 VWS: CPT | Performed by: RADIOLOGY

## 2021-03-17 PROCEDURE — 99214 OFFICE O/P EST MOD 30 MIN: CPT | Performed by: NURSE PRACTITIONER

## 2021-03-17 RX ORDER — METHYLPREDNISOLONE 4 MG
TABLET, DOSE PACK ORAL
Qty: 21 TABLET | Refills: 0 | Status: SHIPPED | OUTPATIENT
Start: 2021-03-17 | End: 2021-04-07

## 2021-03-17 ASSESSMENT — ENCOUNTER SYMPTOMS
DIARRHEA: 0
COUGH: 0
CHILLS: 0
VOMITING: 0
NAUSEA: 0
SHORTNESS OF BREATH: 0
FEVER: 0
RHINORRHEA: 0
SORE THROAT: 0
HEADACHES: 0

## 2021-03-17 NOTE — PROGRESS NOTES
SUBJECTIVE:   Roxane Lopes is a 45 year old female presenting with a chief complaint of   Chief Complaint   Patient presents with     Tailbone Pain     Pain near tailbone for a couple days       She is an established patient of [facility].    Lower Back Pain on the tailbone    Onset of symptoms was 2 day(s) ago.  Location: mid lower back on the tail bone, unable to sit {Course of symptoms is worsening.    Severity severe  Current and Associated symptoms: pain  Denies: fecal incontinence, urinary incontinence, lower extremity numbness, lower extremity weakness and paresthesia    Aggravating Factors: sitting, bending and changing position  Therapies to improve symptoms include: none  Past history: no prior back problems      Review of Systems   Constitutional: Negative for chills and fever.   HENT: Negative for congestion, ear pain, rhinorrhea and sore throat.    Respiratory: Negative for cough and shortness of breath.    Gastrointestinal: Negative for diarrhea, nausea and vomiting.   Musculoskeletal:        Tailbone pain   Neurological: Negative for headaches.   All other systems reviewed and are negative.      Past Medical History:   Diagnosis Date     Depression      DVT (deep venous thrombosis) (H) 4/2011    left leg, was on lovenox and coumadin but have been off since November 2011     H/O tubal ligation     2006      Prediabetes      Family History   Problem Relation Age of Onset     Diabetes Maternal Grandfather      Hypertension Maternal Grandfather      Hyperlipidemia Maternal Grandfather      Other - See Comments Maternal Grandmother         24 yrs old blood clot     Depression Mother      Diabetes Other      C.A.D. No family hx of      Cerebrovascular Disease No family hx of      Anesthesia Reaction No family hx of      Arthritis No family hx of      Asthma No family hx of      Breast Cancer No family hx of      Cancer - colorectal No family hx of      Prostate Cancer No family hx of      Thyroid Disease No  family hx of      Lipids No family hx of      Congenital Anomalies No family hx of      Current Outpatient Medications   Medication Sig Dispense Refill     cyclobenzaprine (FLEXERIL) 10 MG tablet TAKE 1 TABLET BY MOUTH 3 TIMES DAILY AS NEEDED FOR MUSCLE SPASMS 90 tablet 0     hydrOXYzine (ATARAX) 25 MG tablet Take 25 mg 2-3 times a day as needed for anxiety. **caution may cause sedation 90 tablet 1     ibuprofen (ADVIL/MOTRIN) 800 MG tablet Take 1 tablet (800 mg) by mouth every 8 hours as needed for moderate pain 30 tablet 1     losartan (COZAAR) 25 MG tablet Take 1 tablet (25 mg) by mouth daily 60 tablet 0     methylPREDNISolone (MEDROL DOSEPAK) 4 MG tablet therapy pack Follow Package Directions 21 tablet 0     venlafaxine (EFFEXOR-XR) 150 MG 24 hr capsule Take 1 capsule (150 mg) by mouth daily 90 capsule 3     order for DME rollabout  3 month duration 1 Device 0     order for DME Medium short Aircast boot 1 Device 0     Social History     Tobacco Use     Smoking status: Former Smoker     Packs/day: 0.50     Years: 10.00     Pack years: 5.00     Types: Cigarettes     Quit date: 10/16/2012     Years since quittin.4     Smokeless tobacco: Never Used     Tobacco comment: on e cigs now trying to quit   Substance Use Topics     Alcohol use: Yes     Comment: Maybe once a year       OBJECTIVE  BP (!) 144/89 (BP Location: Left arm, Patient Position: Sitting, Cuff Size: Adult Regular)   Pulse 110   Temp 98.1  F (36.7  C) (Tympanic)   Resp 16   Wt 96.6 kg (213 lb)   SpO2 100%   BMI 33.36 kg/m      Physical Exam  Vitals signs and nursing note reviewed.   Constitutional:       General: She is not in acute distress.     Appearance: She is well-developed. She is not diaphoretic.   HENT:      Head: Normocephalic and atraumatic.      Right Ear: Tympanic membrane and external ear normal.      Left Ear: Tympanic membrane and external ear normal.   Eyes:      Pupils: Pupils are equal, round, and reactive to light.   Neck:       Musculoskeletal: Normal range of motion and neck supple.   Pulmonary:      Effort: Pulmonary effort is normal. No respiratory distress.      Breath sounds: Normal breath sounds.   Musculoskeletal:      Comments:  Lumbosacral spine area reveals generalized tenderness without focal tenderness or mass.  Painful and reduced  ROM noted which significantly limits the examination. Straight leg raise is equivocal at minimal degrees flexion on both sides.   NEURO: DTR's, motor strength and sensation normal.           Lymphadenopathy:      Cervical: No cervical adenopathy.   Skin:     General: Skin is warm and dry.   Neurological:      Mental Status: She is alert.      Cranial Nerves: No cranial nerve deficit.         X-Ray was done, my findings are: negative for fracture    ASSESSMENT:      ICD-10-CM    1. Tail bone pain  M53.3 XR Lumbar Spine 2/3 Views     methylPREDNISolone (MEDROL DOSEPAK) 4 MG tablet therapy pack   2. DDD (degenerative disc disease), lumbar  M51.36 methylPREDNISolone (MEDROL DOSEPAK) 4 MG tablet therapy pack        Medical Decision Making:    Differential Diagnosis:  Back Pain: myofascial low back strain, lumbosacral strain, degenerative disc disease, possible herniated disc , acute sciatica, compression fracture, chronic low back pain and spinal cord infection    PLAN:  Rest the affected painful area as much as possible.  Apply ice for 15-20 minutes intermittently as needed and especially after any offending activity. Daily stretching.  As pain recedes, begin normal activities slowly as tolerated.  Consider Physical Therapy if symptoms not better with symptomatic care.        Patient Instructions     Patient Education     Relieving Back Pain  Back pain is a common problem. You can strain back muscles by lifting too much weight or just by moving the wrong way. Back strain can be uncomfortable, even painful. And it can take weeks or months to improve. To help yourself feel better and prevent future  back strains, try these tips.  Important: Don't give aspirin to children or teens without first discussing it with your child's healthcare provider.  Ice    Ice reduces muscle pain and swelling. It helps most during the first 24 to 48 hours after an injury.    Wrap an ice pack or a bag of frozen peas in a thin towel. Never put ice directly on your skin.    Place the ice where your back hurts the most.    Don t ice for more than 20 minutes at a time.    You can use ice several times a day.  Medicines  Over-the-counter pain relievers include acetaminophen and anti-inflammatory medicines, which includes aspirin, naproxen, or ibuprofen. They can help ease discomfort. Some also reduce swelling.    Tell your healthcare provider about any medicines you are already taking.    Take medicines only as directed.  Manipulation and massage  Having manipulation by an osteopathic doctor or chiropractor may be helpful. Getting a massage also may help.   Heat  After the first 48 hours, heat can relax sore muscles and improve blood flow.    Try a warm bath or shower. Or use a heating pad set on low. To prevent a burn, keep a cloth between you and the heating pad.    Don t use a heating pad for more than 15 minutes at a time. Never sleep on a heating pad.  [employer] last reviewed this educational content on 6/1/2018 2000-2020 [employer]. All rights reserved. This information is not intended as a substitute for professional medical care. Always follow your healthcare professional's instructions.

## 2021-03-18 NOTE — PATIENT INSTRUCTIONS
Patient Education     Relieving Back Pain  Back pain is a common problem. You can strain back muscles by lifting too much weight or just by moving the wrong way. Back strain can be uncomfortable, even painful. And it can take weeks or months to improve. To help yourself feel better and prevent future back strains, try these tips.  Important: Don't give aspirin to children or teens without first discussing it with your child's healthcare provider.  Ice    Ice reduces muscle pain and swelling. It helps most during the first 24 to 48 hours after an injury.    Wrap an ice pack or a bag of frozen peas in a thin towel. Never put ice directly on your skin.    Place the ice where your back hurts the most.    Don t ice for more than 20 minutes at a time.    You can use ice several times a day.  Medicines  Over-the-counter pain relievers include acetaminophen and anti-inflammatory medicines, which includes aspirin, naproxen, or ibuprofen. They can help ease discomfort. Some also reduce swelling.    Tell your healthcare provider about any medicines you are already taking.    Take medicines only as directed.  Manipulation and massage  Having manipulation by an osteopathic doctor or chiropractor may be helpful. Getting a massage also may help.   Heat  After the first 48 hours, heat can relax sore muscles and improve blood flow.    Try a warm bath or shower. Or use a heating pad set on low. To prevent a burn, keep a cloth between you and the heating pad.    Don t use a heating pad for more than 15 minutes at a time. Never sleep on a heating pad.  [employer] last reviewed this educational content on 6/1/2018 2000-2020 [employer]. All rights reserved. This information is not intended as a substitute for professional medical care. Always follow your healthcare professional's instructions.

## 2021-04-07 ENCOUNTER — OFFICE VISIT (OUTPATIENT)
Dept: FAMILY MEDICINE | Facility: CLINIC | Age: 46
End: 2021-04-07
Payer: COMMERCIAL

## 2021-04-07 ENCOUNTER — ANCILLARY PROCEDURE (OUTPATIENT)
Dept: GENERAL RADIOLOGY | Facility: CLINIC | Age: 46
End: 2021-04-07
Attending: NURSE PRACTITIONER
Payer: COMMERCIAL

## 2021-04-07 VITALS
OXYGEN SATURATION: 99 % | DIASTOLIC BLOOD PRESSURE: 82 MMHG | RESPIRATION RATE: 16 BRPM | WEIGHT: 210 LBS | BODY MASS INDEX: 32.89 KG/M2 | HEART RATE: 98 BPM | TEMPERATURE: 98.1 F | SYSTOLIC BLOOD PRESSURE: 138 MMHG

## 2021-04-07 DIAGNOSIS — M53.3 SACRAL PAIN: ICD-10-CM

## 2021-04-07 DIAGNOSIS — I10 ESSENTIAL HYPERTENSION: Primary | ICD-10-CM

## 2021-04-07 PROCEDURE — 99214 OFFICE O/P EST MOD 30 MIN: CPT | Performed by: NURSE PRACTITIONER

## 2021-04-07 PROCEDURE — 72220 X-RAY EXAM SACRUM TAILBONE: CPT | Mod: FY | Performed by: RADIOLOGY

## 2021-04-07 RX ORDER — LOSARTAN POTASSIUM 50 MG/1
50 TABLET ORAL DAILY
Qty: 90 TABLET | Refills: 0 | Status: SHIPPED | OUTPATIENT
Start: 2021-04-07 | End: 2021-07-05

## 2021-04-07 NOTE — PROGRESS NOTES
Assessment & Plan     Essential hypertension  Ongoing elevated blood pressure.  Increase losartan to 50 mg daily.  Follow-up with provider in 4 weeks, virtual okay since that she is checking her blood pressures while at home  - losartan (COZAAR) 50 MG tablet; Take 1 tablet (50 mg) by mouth daily    Sacral pain  Unknown cause.  Patient denies any trauma or muscle strain.  X-ray is negative for fracture but shows degenerative disease.  May want to consider MRI if pain is not improving.  She can call in a couple days if she needs refill for Tylenol 3  - acetaminophen-codeine (TYLENOL #3) 300-30 MG tablet; Take 1 tablet by mouth every 6 hours as needed for severe pain  - XR Sacrum and Coccyx 2 Views    Addend:  Sacral MRI ordered, patient will call to schedule as x-ray was negative other than degenerative disc disease      Return in about 1 week (around 4/14/2021), or if symptoms worsen or fail to improve.    MEENAKSHI Ureña, NP-C  [facility]    Julio Betst is a 45 year old who presents for the following health issues  accompanied by her self:    HPI     Constant pain in coccyx, feels like she fell on it. Trouble sitting.  Was in UC on 3/17, x-ray showed some arthritis. But pain is lower then low back. Denies trauma or incident that caused pain.  Patient is currently walking around the room and she is unable to sit due to pain.  She has tried over-the-counter medication, rubs, and even a donut pillow to sit on.  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     HTN: 149/101 at home average. Not much exercise. Less salt.  She is trying to drink enough fluids.  No chest pain or trouble breathing.        Review of Systems   Constitutional, cardiovascular, pulmonary, gi and gu  MS as above, systems are negative, except as otherwise noted.      Objective    /82   Pulse 98   Temp 98.1  F (36.7  C) (Oral)   Resp 16   Wt 95.3 kg (210 lb)   SpO2 99%   BMI 32.89  kg/m    Body mass index is 32.89 kg/m .  Physical Exam   GENERAL: healthy, alert and no distress  RESP: lungs clear to auscultation - no rales, rhonchi or wheezes  CV: regular rate and rhythm, normal S1 S2, no S3 or S4, no murmur, click or rub  MS: no gross musculoskeletal defects noted, but patient walking around room, points to the middle of her coccyx where her significant pain does    Results for orders placed or performed in visit on 04/07/21 (from the past 24 hour(s))   XR Sacrum and Coccyx 2 Views    Narrative    SACRUM AND COCCYX TWO VIEWS April 7, 2021 8:43 AM     HISTORY: Coccyx pain for two to three weeks, no known trauma. Sacral  pain.    FINDINGS: Lumbosacral degenerative disc disease. Otherwise  unremarkable.      Impression    IMPRESSION: No fracture identified.

## 2021-04-08 ENCOUNTER — MYC REFILL (OUTPATIENT)
Dept: FAMILY MEDICINE | Facility: CLINIC | Age: 46
End: 2021-04-08

## 2021-04-08 DIAGNOSIS — M53.3 SACRAL PAIN: ICD-10-CM

## 2021-04-08 NOTE — RESULT ENCOUNTER NOTE
En Betts,  The sacrum x-ray was negative.  It noted some degenerative disc disease but otherwise no fracture.  I will order an MRI.  You can call to make that appointment at [phone].  how is the Tylenol 3 is helping?  Thank you,  MEENAKSHI Ureña, NP-C  [facility]

## 2021-04-09 NOTE — TELEPHONE ENCOUNTER
Provider still unable to send scripts. Can a provider please sign and send pended script.  Thank you,  MEENAKSHI Ureña, NP-C  [facility]

## 2021-04-09 NOTE — TELEPHONE ENCOUNTER
Reviewed [facility] and last filled tylenol 3 for 10 tablets 4//7/21  Prescription sent per request of provider who is unable to sign narcotics at this time.

## 2021-04-09 NOTE — TELEPHONE ENCOUNTER
Routing refill request to provider for review/approval because:  Drug not on the [facility] refill protocol     Cleo NEGRONN, RN

## 2021-04-14 ENCOUNTER — ANCILLARY PROCEDURE (OUTPATIENT)
Dept: MRI IMAGING | Facility: CLINIC | Age: 46
End: 2021-04-14
Attending: NURSE PRACTITIONER
Payer: COMMERCIAL

## 2021-04-14 DIAGNOSIS — L05.91 INFECTED PILONIDAL CYST: Primary | ICD-10-CM

## 2021-04-14 DIAGNOSIS — M53.3 SACRAL PAIN: ICD-10-CM

## 2021-04-14 PROCEDURE — 72195 MRI PELVIS W/O DYE: CPT | Performed by: RADIOLOGY

## 2021-04-14 RX ORDER — CIPROFLOXACIN 500 MG/1
500 TABLET, FILM COATED ORAL 2 TIMES DAILY
Qty: 14 TABLET | Refills: 0 | Status: SHIPPED | OUTPATIENT
Start: 2021-04-14 | End: 2021-04-21

## 2021-04-14 RX ORDER — SULFAMETHOXAZOLE/TRIMETHOPRIM 800-160 MG
1 TABLET ORAL 2 TIMES DAILY
Qty: 20 TABLET | Refills: 0 | Status: SHIPPED | OUTPATIENT
Start: 2021-04-14 | End: 2021-04-14

## 2021-04-14 NOTE — RESULT ENCOUNTER NOTE
En Betts,   The MRI shows a small mass where you have pain, that is slightly increased in size since 2015 upon comparison to the CT at that time. The radiologist suggests possible infected or bleeding pilonidal cyst. I'm going to send an antibiotic to your pharmacy and then consult with a physician on what next steps to take. I hope to be in touch later today or tomorrow sometime.  How is your pain and do you need a refill of the T3s? Keep me posted.    Thank you,  MEENAKSHI Ureña, NP-C  [facility]

## 2021-04-14 NOTE — RESULT ENCOUNTER NOTE
FYI, please advise on suggestions going forward.   Thank you,  MEENAKSHI Ureña, NP-C  [facility]

## 2021-04-15 ENCOUNTER — MYC REFILL (OUTPATIENT)
Dept: FAMILY MEDICINE | Facility: CLINIC | Age: 46
End: 2021-04-15

## 2021-04-15 DIAGNOSIS — M53.3 SACRAL PAIN: ICD-10-CM

## 2021-04-15 NOTE — TELEPHONE ENCOUNTER
Routing refill request to provider for review/approval because:  Drug not on the [facility] refill protocol     **Patient noting has increased pain and asking for increase in dosage-see MyC message below.    Scheduled for general surgery consult on Monday    Malini Hutchinson RN  [facility]

## 2021-04-15 NOTE — RESULT ENCOUNTER NOTE
En Betts,   I am going to switch the antibiotics I sent. It will be ciprofloxacin and will also do a referral for general surgery to help determine if you will need surgical debridement or not. I will do ASAP referral due to the significant discomfort you have been having. Keep me posted about how things are going.   Thank you,  MEENAKSHI Ureña, NP-C  [facility]

## 2021-04-19 NOTE — PROGRESS NOTES
No charge, see other encounter. Only ordered test here.  MEENAKSHI Ureña, NP-C  [facility]

## 2021-04-19 NOTE — TELEPHONE ENCOUNTER
RECORDS RECEIVED FROM: Internal   DATE RECEIVED: 4.20.21   NOTES STATUS DETAILS   OFFICE NOTE from referring provider  Internal 4.7.21 SOFYA Ureña [facility]   OFFICE NOTE from other specialist   Internal 4.19.21 SOFYA Rosario [facility]    DISCHARGE SUMMARY from hospital  N/A    DISCHARGE REPORT from the ER N/A    OPERATIVE REPORT  N/A    MEDICATION LIST Internal    LABS     PFC TESTING N/A    ANAL PAP N/A    BIOPSIES/PATHOLOGY RELATED TO DIAGNOSIS N/A    DIAGNOSTIC PROCEDURES     COLONOSCOPY N/A    UPPER ENDOSCOPY (EGD) N/A    FLEX SIGMOIDOSCOPY  N/A    ERCP N/A    IMAGING (DISC & REPORT)      CT  Internal 10.12.15 CT ab pelvis   MRI Internal 4.14.21 MR sacrum and coccyx     XRAY Internal 4.7.21 XR sacrum and coccyx     ULTRASOUND (ENDOANAL/ENDORECTAL) N/A

## 2021-04-20 ENCOUNTER — OFFICE VISIT (OUTPATIENT)
Dept: SURGERY | Facility: CLINIC | Age: 46
End: 2021-04-20
Payer: COMMERCIAL

## 2021-04-20 ENCOUNTER — ANCILLARY PROCEDURE (OUTPATIENT)
Dept: MRI IMAGING | Facility: CLINIC | Age: 46
End: 2021-04-20
Attending: NURSE PRACTITIONER
Payer: COMMERCIAL

## 2021-04-20 ENCOUNTER — PRE VISIT (OUTPATIENT)
Dept: SURGERY | Facility: CLINIC | Age: 46
End: 2021-04-20

## 2021-04-20 VITALS
HEART RATE: 110 BPM | BODY MASS INDEX: 34.73 KG/M2 | SYSTOLIC BLOOD PRESSURE: 130 MMHG | OXYGEN SATURATION: 100 % | DIASTOLIC BLOOD PRESSURE: 100 MMHG | HEIGHT: 67 IN | WEIGHT: 221.3 LBS

## 2021-04-20 DIAGNOSIS — M79.89 MASS OF SOFT TISSUE OF PELVIS: Primary | ICD-10-CM

## 2021-04-20 DIAGNOSIS — K61.2 ABSCESS OF ANAL AND RECTAL REGIONS: ICD-10-CM

## 2021-04-20 PROCEDURE — 72197 MRI PELVIS W/O & W/DYE: CPT | Mod: GC | Performed by: RADIOLOGY

## 2021-04-20 PROCEDURE — A9585 GADOBUTROL INJECTION: HCPCS | Performed by: RADIOLOGY

## 2021-04-20 PROCEDURE — 99203 OFFICE O/P NEW LOW 30 MIN: CPT | Performed by: COLON & RECTAL SURGERY

## 2021-04-20 RX ORDER — GADOBUTROL 604.72 MG/ML
10 INJECTION INTRAVENOUS ONCE
Status: COMPLETED | OUTPATIENT
Start: 2021-04-20 | End: 2021-04-20

## 2021-04-20 RX ORDER — OXYCODONE HYDROCHLORIDE 5 MG/1
5 TABLET ORAL EVERY 6 HOURS PRN
Qty: 10 TABLET | Refills: 0 | Status: SHIPPED | OUTPATIENT
Start: 2021-04-20 | End: 2021-04-25

## 2021-04-20 RX ADMIN — GADOBUTROL 10 ML: 604.72 INJECTION INTRAVENOUS at 17:20

## 2021-04-20 ASSESSMENT — MIFFLIN-ST. JEOR: SCORE: 1686.44

## 2021-04-20 ASSESSMENT — PAIN SCALES - GENERAL: PAINLEVEL: MILD PAIN (2)

## 2021-04-20 NOTE — PATIENT INSTRUCTIONS
Follow up:    Please call with any questions or concerns regarding your clinic visit today.    It is a pleasure being involved in your health care.    Contacts post-consultation depending on your need:    Radiology Appointments [phone]    Schedule Clinic Appointments [phone] # 1   M-F 7:30 - 5 pm    BORA Perez [phone]    Clinic Fax Number [phone]    Surgery Scheduling [phone]    My Chart is available 24 hours a day and is a secure way to access your records and communicate with your care team.  I strongly recommend signing up if you haven't already done so, if you are comfortable with computers.  If you would like to inquire about this or are having problems with My Chart access, you may call [phone] or go online at [email or web address].  Please allow at least 24 hours for a response and extra time on weekends and Holidays.

## 2021-04-20 NOTE — PROGRESS NOTES
"Colon and Rectal Surgery Clinic Note    RE: Roxane Lopes  : 1975  ASHLEE: 2021    Roxane is a 46 year old woman who presents today for painful cyst by tailbone.    HPI:  Roxane developed pain near her tailbone about 8 weeks ago. She went to urgent care and was diagnosed with arthritis.    Lumbar Xray 3/17/21:  IMPRESSION: Five lumbar vertebral bodies. Normal vertebral body height  and alignment. No fracture or subluxation. Moderate to advanced  degenerative interspace narrowing, endplate spurring and sclerosis at  L5-S1. Other interspaces appear preserved.    Xray Sacrum/Coccyx 21:  IMPRESSION: No fracture identified.    Pain continued to increase and she had a MR Sacrum/Coccyx 21:  IMPRESSION: Approximately 1.8 x 3.2 x 3.7 cm bilobed shaped mass,  mildly increased in size, especially at craniocaudal dimension where  today's study showing new bilobed configuration. Signal characteristic  not compatible with simple cyst. Given its relative location in  proximity to intergluteal crease and relatively slow growth, the  differential consideration include entity such as infected/bleeding  pilonidal cysts. Consider contrast enhanced MRI focusing on the  perineal region.    Roxane has been having worsening pain over the past 3-4 days. The pain gets severe where she feels like passing out and has chills and sweats about twice a day. No drainage. No fevers. She has never had anything like this in the past. She is otherwise healthy. She does not smoke. She works as a pharmacy tech at [employer].    Physical examination:  Examination was chaperoned by Estella Kahn NP.     Vitals: BP (!) 130/100 (BP Location: Left arm, Patient Position: Sitting, Cuff Size: Adult Regular)   Pulse 110   Ht 5' 7\"   Wt 221 lb 4.8 oz   SpO2 100%   BMI 34.66 kg/m    BMI= Body mass index is 34.66 kg/m .    Alert, oriented, in no acute distress. Perianal exam with no palpable induration or fluctuance. She is acutely tender to the " right of the kirsten cleft at the tip of the coccyx. Digital rectal exam with no palpable cyst initially but it is appreciable inferior to the coccyx between 2 examining fingers.       Laboratory data:    Recent Labs   Lab Test 20  0925   WBC 4.9   HGB 13.0      CR 0.58   ALBUMIN 3.9   BILITOTAL 0.5   ALKPHOS 74   ALT 20   AST 17       Assessment/plan:  MRI with cystic lesion near the coccyx. This is very symptomatic for Roxane, which is unsual for a simple cyst. This is not a pilonidal by location alone and no concerning features on [facility] MRI but would like to obtain 3T MRI to further characterize. Will consider aspirating some of the fluid for cytology prior to considering excision if MRI shows features concerning for malignancy.  Will follow up tomorrow after MRI for further plan. Recommended scheduled tylenol and ibuprofen for pain and provided her with oxycodone to use additionally. Encouraged her to contact the clinic in the meantime with any worsening symptoms, questions, or concerns. Patient's questions were answered to her stated satisfaction and she is in agreement with this plan.    For details of past medical history, surgical history, family history, medications, allergies, and review of systems, please see details below.    Medical history:  Past Medical History:   Diagnosis Date     Depression      DVT (deep venous thrombosis) (H) 2011    left leg, was on lovenox and coumadin but have been off since 2011     H/O tubal ligation           Prediabetes        Surgical history:  Past Surgical History:   Procedure Laterality Date     LAPAROSCOPIC CHOLECYSTECTOMY  10/9/2012    Procedure: LAPAROSCOPIC CHOLECYSTECTOMY;  Laparoscopic Cholecystectomy;  Surgeon: Martell Briscoe MD;  Location: [facility] OR     LAPAROSCOPIC TUBAL LIGATION         Family history:  Family History   Problem Relation Age of Onset     Diabetes Maternal Grandfather      Hypertension Maternal Grandfather       Hyperlipidemia Maternal Grandfather      Other - See Comments Maternal Grandmother         24 yrs old blood clot     Depression Mother      Diabetes Other      C.A.D. No family hx of      Cerebrovascular Disease No family hx of      Anesthesia Reaction No family hx of      Arthritis No family hx of      Asthma No family hx of      Breast Cancer No family hx of      Cancer - colorectal No family hx of      Prostate Cancer No family hx of      Thyroid Disease No family hx of      Lipids No family hx of      Congenital Anomalies No family hx of        Medications:  Current Outpatient Medications   Medication Sig Dispense Refill     acetaminophen-codeine (TYLENOL #3) 300-30 MG tablet Take 1 tablet by mouth every 6 hours as needed for severe pain 12 tablet 0     cyclobenzaprine (FLEXERIL) 10 MG tablet TAKE 1 TABLET BY MOUTH 3 TIMES DAILY AS NEEDED FOR MUSCLE SPASMS 90 tablet 0     losartan (COZAAR) 25 MG tablet Take 1 tablet (25 mg) by mouth daily 60 tablet 0     losartan (COZAAR) 50 MG tablet Take 1 tablet (50 mg) by mouth daily 90 tablet 0     ciprofloxacin (CIPRO) 500 MG tablet Take 1 tablet (500 mg) by mouth 2 times daily for 7 days (Patient not taking: Reported on 4/20/2021) 14 tablet 0     hydrOXYzine (ATARAX) 25 MG tablet Take 25 mg 2-3 times a day as needed for anxiety. **caution may cause sedation (Patient not taking: Reported on 4/20/2021) 90 tablet 1     ibuprofen (ADVIL/MOTRIN) 800 MG tablet Take 1 tablet (800 mg) by mouth every 8 hours as needed for moderate pain 30 tablet 1     order for DME rollabout  3 month duration (Patient not taking: Reported on 4/20/2021) 1 Device 0     order for DME Medium short Aircast boot (Patient not taking: Reported on 4/20/2021) 1 Device 0     venlafaxine (EFFEXOR-XR) 150 MG 24 hr capsule Take 1 capsule (150 mg) by mouth daily (Patient not taking: Reported on 4/20/2021) 90 capsule 3       Allergies:  The patientis allergic to chantix [varenicline tartrate].    Social  "history:  Social History     Tobacco Use     Smoking status: Former Smoker     Packs/day: 0.50     Years: 10.00     Pack years: 5.00     Types: Cigarettes     Quit date: 10/16/2012     Years since quittin.5     Smokeless tobacco: Never Used     Tobacco comment: on e cigs now trying to quit   Substance Use Topics     Alcohol use: Yes     Comment: Maybe once a year     Marital status: single.    Review of Systems:  Nursing Notes:   Lauren Warren  2021 11:13 AM  Signed  Chief Complaint   Patient presents with     New Patient     Pilonidal cyst       Vitals:    21 1110   BP: (!) 130/100   BP Location: Left arm   Patient Position: Sitting   Cuff Size: Adult Regular   Pulse: 110   SpO2: 100%   Weight: 221 lb 4.8 oz   Height: 5' 7\"       Body mass index is 34.66 kg/m .    Lauren Warren MA         30 minutes spent on the date of the encounter doing chart review, history and exam, documentation, review of imaging independently and with radiology, and further activities as noted above.     Leandro Charles MD   Professor and Chief  Division of Colon and Rectal Surgery  [facility]      Referring Provider:  No referring provider defined for this encounter.     Primary Care Provider:  Miguelina Bledsoe    This note was created using speech recognition software and may contain unintended word substitutions.  "

## 2021-04-20 NOTE — NURSING NOTE
"Chief Complaint   Patient presents with     New Patient     Pilonidal cyst       Vitals:    04/20/21 1110   BP: (!) 130/100   BP Location: Left arm   Patient Position: Sitting   Cuff Size: Adult Regular   Pulse: 110   SpO2: 100%   Weight: 221 lb 4.8 oz   Height: 5' 7\"       Body mass index is 34.66 kg/m .    Lauren Warren MA    "

## 2021-04-20 NOTE — DISCHARGE INSTRUCTIONS
MRI Contrast Discharge Instructions    The IV contrast you received today will pass out of your body in your  urine. This will happen in the next 24 hours. You will not feel this process.  Your urine will not change color.    Drink at least 4 extra glasses of water or juice today (unless your doctor  has restricted your fluids). This reduces the stress on your kidneys.  You may take your regular medicines.    If you are on dialysis: It is best to have dialysis today.    If you have a reaction: Most reactions happen right away. If you have  any new symptoms after leaving the hospital (such as hives or swelling),  call your hospital at the correct number below. Or call your family doctor.  If you have breathing distress or wheezing, call 911.    Special instructions: ***    I have read and understand the above information.    Signature:______________________________________ Date:___________    Staff:__________________________________________ Date:___________     Time:__________    [place] [facility] Departments:    ___[place]: [phone]  ___[facility]: [phone]  ___[place]: [phone] ___[facility]: [phone]  ___[facility]: [phone]  ___[facility]: [phone]  ___[phone]  ___[facility]: [phone]  ___[phone]

## 2021-04-20 NOTE — LETTER
2021       RE: Roxane Lopes  [address] [place] [id number]     Dear Colleague,    Thank you for referring your patient, Roxane Lopes, to the [facility] COLON AND RECTAL SURGERY CLINIC [place] [facility]. Please see a copy of my visit note below.    Colon and Rectal Surgery Clinic Note    RE: Roxane Lopes  : 1975  ASHLEE: 2021    Roxane is a 46 year old woman who presents today for painful cyst by tailbone.    HPI:  Roxane developed pain near her tailbone about 8 weeks ago. She went to urgent care and was diagnosed with arthritis.    Lumbar Xray 3/17/21:  IMPRESSION: Five lumbar vertebral bodies. Normal vertebral body height  and alignment. No fracture or subluxation. Moderate to advanced  degenerative interspace narrowing, endplate spurring and sclerosis at  L5-S1. Other interspaces appear preserved.    Xray Sacrum/Coccyx 21:  IMPRESSION: No fracture identified.    Pain continued to increase and she had a MR Sacrum/Coccyx 21:  IMPRESSION: Approximately 1.8 x 3.2 x 3.7 cm bilobed shaped mass,  mildly increased in size, especially at craniocaudal dimension where  today's study showing new bilobed configuration. Signal characteristic  not compatible with simple cyst. Given its relative location in  proximity to intergluteal crease and relatively slow growth, the  differential consideration include entity such as infected/bleeding  pilonidal cysts. Consider contrast enhanced MRI focusing on the  perineal region.    Roxane has been having worsening pain over the past 3-4 days. The pain gets severe where she feels like passing out and has chills and sweats about twice a day. No drainage. No fevers. She has never had anything like this in the past. She is otherwise healthy. She does not smoke. She works as a pharmacy tech at [employer].    Physical examination:  Examination was chaperoned by Estella Kahn NP.     Vitals: BP  "(!) 130/100 (BP Location: Left arm, Patient Position: Sitting, Cuff Size: Adult Regular)   Pulse 110   Ht 5' 7\"   Wt 221 lb 4.8 oz   SpO2 100%   BMI 34.66 kg/m    BMI= Body mass index is 34.66 kg/m .    Alert, oriented, in no acute distress. Perianal exam with no palpable induration or fluctuance. She is acutely tender to the right of the  cleft at the tip of the coccyx. Digital rectal exam with no palpable cyst initially but it is appreciable inferior to the coccyx between 2 examining fingers.       Laboratory data:    Recent Labs   Lab Test 20  0925   WBC 4.9   HGB 13.0      CR 0.58   ALBUMIN 3.9   BILITOTAL 0.5   ALKPHOS 74   ALT 20   AST 17       Assessment/plan:  MRI with cystic lesion near the coccyx. This is very symptomatic for Roxane, which is unsual for a simple cyst. This is not a pilonidal by location alone and no concerning features on MSK MRI but would like to obtain 3T MRI to further characterize. Will consider aspirating some of the fluid for cytology prior to considering excision if MRI shows features concerning for malignancy.  Will follow up tomorrow after MRI for further plan. Recommended scheduled tylenol and ibuprofen for pain and provided her with oxycodone to use additionally. Encouraged her to contact the clinic in the meantime with any worsening symptoms, questions, or concerns. Patient's questions were answered to her stated satisfaction and she is in agreement with this plan.    For details of past medical history, surgical history, family history, medications, allergies, and review of systems, please see details below.    Medical history:  Past Medical History:   Diagnosis Date     Depression      DVT (deep venous thrombosis) (H) 2011    left leg, was on lovenox and coumadin but have been off since 2011     H/O tubal ligation           Prediabetes        Surgical history:  Past Surgical History:   Procedure Laterality Date     LAPAROSCOPIC " CHOLECYSTECTOMY  10/9/2012    Procedure: LAPAROSCOPIC CHOLECYSTECTOMY;  Laparoscopic Cholecystectomy;  Surgeon: Martell Briscoe MD;  Location: [facility] OR     LAPAROSCOPIC TUBAL LIGATION         Family history:  Family History   Problem Relation Age of Onset     Diabetes Maternal Grandfather      Hypertension Maternal Grandfather      Hyperlipidemia Maternal Grandfather      Other - See Comments Maternal Grandmother         24 yrs old blood clot     Depression Mother      Diabetes Other      C.A.D. No family hx of      Cerebrovascular Disease No family hx of      Anesthesia Reaction No family hx of      Arthritis No family hx of      Asthma No family hx of      Breast Cancer No family hx of      Cancer - colorectal No family hx of      Prostate Cancer No family hx of      Thyroid Disease No family hx of      Lipids No family hx of      Congenital Anomalies No family hx of        Medications:  Current Outpatient Medications   Medication Sig Dispense Refill     acetaminophen-codeine (TYLENOL #3) 300-30 MG tablet Take 1 tablet by mouth every 6 hours as needed for severe pain 12 tablet 0     cyclobenzaprine (FLEXERIL) 10 MG tablet TAKE 1 TABLET BY MOUTH 3 TIMES DAILY AS NEEDED FOR MUSCLE SPASMS 90 tablet 0     losartan (COZAAR) 25 MG tablet Take 1 tablet (25 mg) by mouth daily 60 tablet 0     losartan (COZAAR) 50 MG tablet Take 1 tablet (50 mg) by mouth daily 90 tablet 0     ciprofloxacin (CIPRO) 500 MG tablet Take 1 tablet (500 mg) by mouth 2 times daily for 7 days (Patient not taking: Reported on 4/20/2021) 14 tablet 0     hydrOXYzine (ATARAX) 25 MG tablet Take 25 mg 2-3 times a day as needed for anxiety. **caution may cause sedation (Patient not taking: Reported on 4/20/2021) 90 tablet 1     ibuprofen (ADVIL/MOTRIN) 800 MG tablet Take 1 tablet (800 mg) by mouth every 8 hours as needed for moderate pain 30 tablet 1     order for DME rollabout  3 month duration (Patient not taking: Reported on 4/20/2021) 1 Device 0      "order for DME Medium short Aircast boot (Patient not taking: Reported on 2021) 1 Device 0     venlafaxine (EFFEXOR-XR) 150 MG 24 hr capsule Take 1 capsule (150 mg) by mouth daily (Patient not taking: Reported on 2021) 90 capsule 3       Allergies:  The patientis allergic to chantix [varenicline tartrate].    Social history:  Social History     Tobacco Use     Smoking status: Former Smoker     Packs/day: 0.50     Years: 10.00     Pack years: 5.00     Types: Cigarettes     Quit date: 10/16/2012     Years since quittin.5     Smokeless tobacco: Never Used     Tobacco comment: on e cigs now trying to quit   Substance Use Topics     Alcohol use: Yes     Comment: Maybe once a year     Marital status: single.    Review of Systems:  Nursing Notes:   Lauren Warren  2021 11:13 AM  Signed  Chief Complaint   Patient presents with     New Patient     Pilonidal cyst       Vitals:    21 1110   BP: (!) 130/100   BP Location: Left arm   Patient Position: Sitting   Cuff Size: Adult Regular   Pulse: 110   SpO2: 100%   Weight: 221 lb 4.8 oz   Height: 5' 7\"       Body mass index is 34.66 kg/m .    Lauren Warren MA      30 minutes spent on the date of the encounter doing chart review, history and exam, documentation, review of imaging independently and with radiology, and further activities as noted above.     Leandro Charles MD   Professor and Chief  Division of Colon and Rectal Surgery  [facility]    Referring Provider:  No referring provider defined for this encounter.     Primary Care Provider:  Miguelina Bledsoe    This note was created using speech recognition software and may contain unintended word substitutions.      "

## 2021-04-22 DIAGNOSIS — M46.28 ABSCESS OF COCCYX (H): Primary | ICD-10-CM

## 2021-04-25 ENCOUNTER — MYC REFILL (OUTPATIENT)
Dept: SURGERY | Facility: CLINIC | Age: 46
End: 2021-04-25

## 2021-04-25 DIAGNOSIS — K62.89 RECTAL PAIN: Primary | ICD-10-CM

## 2021-04-26 RX ORDER — OXYCODONE HYDROCHLORIDE 5 MG/1
5 TABLET ORAL EVERY 6 HOURS PRN
Qty: 10 TABLET | Refills: 0 | Status: SHIPPED | OUTPATIENT
Start: 2021-04-26 | End: 2021-04-30

## 2021-04-27 ENCOUNTER — TELEPHONE (OUTPATIENT)
Dept: SURGERY | Facility: CLINIC | Age: 46
End: 2021-04-27

## 2021-04-27 DIAGNOSIS — Z11.59 ENCOUNTER FOR SCREENING FOR OTHER VIRAL DISEASES: ICD-10-CM

## 2021-04-27 PROBLEM — M46.28: Status: ACTIVE | Noted: 2021-04-27

## 2021-04-27 NOTE — TELEPHONE ENCOUNTER
FUTURE VISIT INFORMATION      SURGERY INFORMATION:    Date: 4/30/21    [facility]    Surgeon:  Leandro Charles MD    Anesthesia Type:  mac    Procedure: Excision of sacral cyst    Consult: ov 4/20    RECORDS REQUESTED FROM:       Primary Care Provider: Trupti Au NP- [facility]    Pertinent Medical History: dvt    Most recent EKG+ Tracing: 10/16/17- [facility]

## 2021-04-27 NOTE — TELEPHONE ENCOUNTER
"Case Request received to schedule an outpatient procedure with Dr. Charles at the [facility].    Patient called, she'd like to schedule her procedure asap. Completed the following scheduling via phone, then sent Surgery Packet to patient via [employer]:     Required: Yes, you will need a  18 years or older to drive you home from your procedure as well as stay with you for 24 hours after your procedure    Surgery: Exam under anesthesia, excision of sacral cyst    Date: April 30, 2021                    Surgeon: Dr. Leandro Charles    Time: You will receive a call 2-3 days prior to your surgery with your finalized surgery and arrival time.     Location: [facility] [place] [address]  [phone]      Upcoming Appointments:     Pre-operative COVID-19 Test:  Pre-procedure asymptomatic COVID PCR:  4/28/2021 at 5:15 PM                                                                      [facility]                                                                      [address] [place] [address]    Pre-operative Physical appointment:   Clinic appointment with Pre-operative Assessment Center (PAC): 4/29/2021 at 6:00 PM                                                 VIDEO VISIT    Post operative appointment:  Clinic appointment with Estella Umana NP: 5/12/2021 at 9:00 AM                                                                      [facility])                                                                      [address] [place] [id number]     Preparation: 2 [employer] (See \"Day of Surgery\")    "

## 2021-04-28 DIAGNOSIS — Z11.59 ENCOUNTER FOR SCREENING FOR OTHER VIRAL DISEASES: ICD-10-CM

## 2021-04-28 LAB
SARS-COV-2 RNA RESP QL NAA+PROBE: NORMAL
SPECIMEN SOURCE: NORMAL

## 2021-04-28 PROCEDURE — 99000 SPECIMEN HANDLING OFFICE-LAB: CPT | Performed by: PATHOLOGY

## 2021-04-28 PROCEDURE — U0003 INFECTIOUS AGENT DETECTION BY NUCLEIC ACID (DNA OR RNA); SEVERE ACUTE RESPIRATORY SYNDROME CORONAVIRUS 2 (SARS-COV-2) (CORONAVIRUS DISEASE [COVID-19]), AMPLIFIED PROBE TECHNIQUE, MAKING USE OF HIGH THROUGHPUT TECHNOLOGIES AS DESCRIBED BY CMS-2020-01-R: HCPCS | Mod: 90 | Performed by: PATHOLOGY

## 2021-04-28 PROCEDURE — U0005 INFEC AGEN DETEC AMPLI PROBE: HCPCS | Mod: 90 | Performed by: PATHOLOGY

## 2021-04-29 ENCOUNTER — PRE VISIT (OUTPATIENT)
Dept: SURGERY | Facility: CLINIC | Age: 46
End: 2021-04-29

## 2021-04-29 ENCOUNTER — VIRTUAL VISIT (OUTPATIENT)
Dept: SURGERY | Facility: CLINIC | Age: 46
End: 2021-04-29
Payer: COMMERCIAL

## 2021-04-29 ENCOUNTER — ANESTHESIA EVENT (OUTPATIENT)
Dept: SURGERY | Facility: AMBULATORY SURGERY CENTER | Age: 46
End: 2021-04-29

## 2021-04-29 DIAGNOSIS — M46.28 ABSCESS OF COCCYX (H): ICD-10-CM

## 2021-04-29 DIAGNOSIS — Z01.818 PREOP EXAMINATION: Primary | ICD-10-CM

## 2021-04-29 PROCEDURE — 99203 OFFICE O/P NEW LOW 30 MIN: CPT | Mod: 95 | Performed by: NURSE PRACTITIONER

## 2021-04-29 SDOH — ECONOMIC STABILITY: TRANSPORTATION INSECURITY
IN THE PAST 12 MONTHS, HAS THE LACK OF TRANSPORTATION KEPT YOU FROM MEDICAL APPOINTMENTS OR FROM GETTING MEDICATIONS?: NOT ASKED

## 2021-04-29 SDOH — ECONOMIC STABILITY: FOOD INSECURITY: WITHIN THE PAST 12 MONTHS, THE FOOD YOU BOUGHT JUST DIDN'T LAST AND YOU DIDN'T HAVE MONEY TO GET MORE.: NOT ASKED

## 2021-04-29 SDOH — ECONOMIC STABILITY: FOOD INSECURITY: WITHIN THE PAST 12 MONTHS, YOU WORRIED THAT YOUR FOOD WOULD RUN OUT BEFORE YOU GOT MONEY TO BUY MORE.: NOT ASKED

## 2021-04-29 SDOH — ECONOMIC STABILITY: TRANSPORTATION INSECURITY
IN THE PAST 12 MONTHS, HAS LACK OF TRANSPORTATION KEPT YOU FROM MEETINGS, WORK, OR FROM GETTING THINGS NEEDED FOR DAILY LIVING?: NOT ASKED

## 2021-04-29 SDOH — ECONOMIC STABILITY: INCOME INSECURITY: HOW HARD IS IT FOR YOU TO PAY FOR THE VERY BASICS LIKE FOOD, HOUSING, MEDICAL CARE, AND HEATING?: NOT ASKED

## 2021-04-29 ASSESSMENT — LIFESTYLE VARIABLES: TOBACCO_USE: 1

## 2021-04-29 ASSESSMENT — PAIN SCALES - GENERAL: PAINLEVEL: MILD PAIN (2)

## 2021-04-29 NOTE — H&P
Pre-Operative H & P         Video-Visit Details    Type of service:  Video Visit    Patient verbally consented to video service today: YES      Video Start Time: 1105  Video End Time (time video stopped): 1122    Originating Location (pt. Location): Other parked in her car    Distant Location (provider location): provider's home    Mode of Communication:  Doximity      CC:  Preoperative exam to assess for increased cardiopulmonary risk while undergoing surgery and anesthesia.    Date of Encounter: 4/29/2021  Primary Care Physician:  Miguelina Bledsoe  Associated diagnosis: abscess of coccyx    HPI  Roxane Lopes is a 46 year old female who presents for pre-operative H & P in preparation for an EXAM UNDER ANESTHESIA (N/A Anus) Excision of sacral cyst (N/A Anus) on 4/30/21 by Dr. Charles at [facility].     Roxane Lopes 46 year old female with hypertension, nicotine dependence, history of DVT, obesity, prediabetes, GERD, anxiety and depression that has an abscess of the coccyx.  She started having coccyx pain about 9 weeks ago.  She presented to an urgent care for evaluation and was initially diagnosed with arthritis, but pain persisted.  She followed up with her primary care provider and was started on keflex for possible infection, but that wasn't helpful.  She later had an MRI which showed the coccyx abscess and was subsequently referred to Dr. Charles for surgical consultation.  She has been using ibuprofen and oxycodone for pain management as she continues to have pain with sitting or palpation of the area.  The above listed procedure has now been recommended for treatment.     History is obtained from the patient and the medical record.     Past Medical History  Past Medical History:   Diagnosis Date     Benign essential hypertension      Depression      DVT (deep venous thrombosis) (H) 04/2011    left leg, was on lovenox and coumadin but have been off since November 2011     H/O tubal  ligation     2006      Obesity      Prediabetes        Past Surgical History  Past Surgical History:   Procedure Laterality Date     LAPAROSCOPIC CHOLECYSTECTOMY  10/9/2012    Procedure: LAPAROSCOPIC CHOLECYSTECTOMY;  Laparoscopic Cholecystectomy;  Surgeon: Martell Briscoe MD;  Location: [facility] OR     LAPAROSCOPIC TUBAL LIGATION         Hx of Blood transfusions/reactions: none     Hx of abnormal bleeding or anti-platelet use: none    Menstrual history: Patient's last menstrual period was 04/07/2021 (approximate).:     Steroid use in the last year: none    Personal or FH with difficulty with Anesthesia:  none    Prior to Admission Medications  Current Outpatient Medications   Medication Sig Dispense Refill     cyclobenzaprine (FLEXERIL) 10 MG tablet TAKE 1 TABLET BY MOUTH 3 TIMES DAILY AS NEEDED FOR MUSCLE SPASMS (Patient taking differently: as needed ) 90 tablet 0     losartan (COZAAR) 50 MG tablet Take 1 tablet (50 mg) by mouth daily (Patient taking differently: Take 50 mg by mouth At Bedtime ) 90 tablet 0     oxyCODONE (ROXICODONE) 5 MG tablet Take 1 tablet (5 mg) by mouth every 6 hours as needed for severe pain 10 tablet 0     venlafaxine (EFFEXOR-XR) 150 MG 24 hr capsule Take 1 capsule (150 mg) by mouth daily (Patient taking differently: Take 150 mg by mouth At Bedtime ) 90 capsule 3     hydrOXYzine (ATARAX) 25 MG tablet Take 25 mg 2-3 times a day as needed for anxiety. **caution may cause sedation (Patient not taking: Reported on 4/29/2021) 90 tablet 1     ibuprofen (ADVIL/MOTRIN) 800 MG tablet Take 800 mg by mouth every 8 hours as needed for moderate pain  30 tablet 1     order for DME rollabout  3 month duration (Patient not taking: Reported on 4/20/2021) 1 Device 0     order for DME Medium short Aircast boot (Patient not taking: Reported on 4/20/2021) 1 Device 0       Allergies  Allergies   Allergen Reactions     Chantix [Varenicline Tartrate] Anxiety       Social History  Social History     Socioeconomic  History     Marital status:      Spouse name: Not on file     Number of children: 4     Years of education: Not on file     Highest education level: Not on file   Occupational History     Occupation: pharmacy tech   Social Needs     Financial resource strain: Not on file     Food insecurity     Worry: Not on file     Inability: Not on file     Transportation needs     Medical: Not on file     Non-medical: Not on file   Tobacco Use     Smoking status: Former Smoker     Packs/day: 0.50     Years: 10.00     Pack years: 5.00     Types: Cigarettes, Other     Quit date: 10/16/2012     Years since quittin.5     Smokeless tobacco: Never Used     Tobacco comment: Vaping now    Substance and Sexual Activity     Alcohol use: Yes     Comment: Maybe once a year     Drug use: No     Sexual activity: Yes     Partners: Male     Birth control/protection: Surgical     Comment:  one partner   Lifestyle     Physical activity     Days per week: Not on file     Minutes per session: Not on file     Stress: Not on file   Relationships     Social connections     Talks on phone: Not on file     Gets together: Not on file     Attends Gnosticist service: Not on file     Active member of club or organization: Not on file     Attends meetings of clubs or organizations: Not on file     Relationship status: Not on file     Intimate partner violence     Fear of current or ex partner: Not on file     Emotionally abused: Not on file     Physically abused: Not on file     Forced sexual activity: Not on file   Other Topics Concern     Parent/sibling w/ CABG, MI or angioplasty before 65F 55M? No   Social History Narrative     Not on file       Family History  Family History   Problem Relation Age of Onset     Diabetes Maternal Grandfather      Hypertension Maternal Grandfather      Hyperlipidemia Maternal Grandfather      Deep Vein Thrombosis Maternal Grandmother 32     Depression Mother      Unknown/Adopted Father      Diabetes Other       Unknown/Adopted Paternal Grandmother      C.A.D. No family hx of      Cerebrovascular Disease No family hx of      Anesthesia Reaction No family hx of      Arthritis No family hx of      Asthma No family hx of      Breast Cancer No family hx of      Cancer - colorectal No family hx of      Prostate Cancer No family hx of      Thyroid Disease No family hx of      Lipids No family hx of      Congenital Anomalies No family hx of              ROS/MED HISTORY OF  The complete review of systems is negative other than noted in the HPI or here.   ENT/Pulmonary: Comment: Hasn't received covid vaccines    (+) MESSI risk factors, hypertension, tobacco use, Past use,  (-) sleep apnea and recent URI   Neurologic:  - neg neurologic ROS     Cardiovascular:     (+) hypertension-----Previous cardiac testing   Echo: Date: Results:    Stress Test: Date: Results:    ECG Reviewed: Date: 2017 Results:  NSR  Cath: Date: Results:      METS/Exercise Tolerance: >4 METS    Hematologic:     (+) History of blood clots, pt is not anticoagulated,  (-) history of blood transfusion   Musculoskeletal: Comment: Lumbar DDD - periodic low back pain      GI/Hepatic:    (-) GERD and liver disease   Renal/Genitourinary:  - neg Renal ROS     Endo: Comment: prediabetes    (+) Obesity,     Psychiatric/Substance Use:     (+) psychiatric history anxiety and depression     Infectious Disease:  - neg infectious disease ROS  (-) Recent Fever   Malignancy:  - neg malignancy ROS     Other: Comment: Abscess of coccyx                                      0 lbs 0 oz  Data Unavailable   There is no height or weight on file to calculate BMI.       Physical Exam  Constitutional: Awake, alert, cooperative, no apparent distress, and appears stated age.  Neurologic: Awake, alert, oriented to name, place and time.   Neuropsychiatric: Calm, cooperative. Normal affect.     Labs: (personally reviewed)  Component      Latest Ref Rng & Units 11/5/2020   Sodium      133 - 144  mmol/L 136   Potassium      3.4 - 5.3 mmol/L 3.6   Chloride      94 - 109 mmol/L 105   Carbon Dioxide      20 - 32 mmol/L 27   Anion Gap      3 - 14 mmol/L 4   Glucose      70 - 99 mg/dL 88   Urea Nitrogen      7 - 30 mg/dL 7   Creatinine      0.52 - 1.04 mg/dL 0.58   GFR Estimate      >60 mL/min/1.73:m2 >90   GFR Estimate If Black      >60 mL/min/1.73:m2 >90   Calcium      8.5 - 10.1 mg/dL 8.6   Bilirubin Total      0.2 - 1.3 mg/dL 0.5   Albumin      3.4 - 5.0 g/dL 3.9   Protein Total      6.8 - 8.8 g/dL 7.4   Alkaline Phosphatase      40 - 150 U/L 74   ALT      0 - 50 U/L 20   AST      0 - 45 U/L 17   WBC      4.0 - 11.0 10e9/L 4.9   RBC Count      3.8 - 5.2 10e12/L 4.17   Hemoglobin      11.7 - 15.7 g/dL 13.0   Hematocrit      35.0 - 47.0 % 38.5   MCV      78 - 100 fl 92   MCH      26.5 - 33.0 pg 31.2   MCHC      31.5 - 36.5 g/dL 33.8   RDW      10.0 - 15.0 % 13.6   Platelet Count      150 - 450 10e9/L 332   Hemoglobin A1C      0 - 5.6 % 4.9   **creatinine, K+ and hgb ordered for DOS**        EK  NSR      Outside records reviewed from: Care Everywhere      ASSESSMENT and PLAN  Roxane COWART Moses 46 year old female scheduled for an EXAM UNDER ANESTHESIA (N/A Anus) Excision of sacral cyst (N/A Anus) on 21 by Dr. Charles in treatment of an abscess of the coccyx.  PAC referral for risk assessment and optimization for anesthesia with comorbid conditions of: hypertension, nicotine dependence, history of DVT, obesity, prediabetes, GERD, anxiety and depression.     Pre-operative considerations:  1.  Cardiac:  Functional status- METS >4.  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  She is on losartan for hypertension which she takes in the evenings.   Low risk surgery with 0.4% risk of major adverse cardiac event.   2.  Pulm:  MESSI risk: low.  She quit smoking in  2012, but is still vaping e-cigs.    3.  GI:  Risk of PONV score = 3.  If > 2, anti-emetic intervention recommended.  Denies GERD.  4. Heme:  History of DVT with unknown cause in 2011/2012.  Is no longer anticoagulated.  Family history of of maternal grandmother also having a DVT with unknown cause.  No known family history of any blood clotting disorders.  VTE risk = 3%.  5. Endo:  She is obese with a BMI >30.   6. Pain:  She is taking a total of 5mg - 10mg oxycodone daily for pain management.  Morphine equivalent dosing = 7.5-15mg.      Virtual visit - Please refer to the physical examination documented by the anesthesiologist in the anesthesia record on the day of surgery      Patient is optimized and is acceptable candidate for the proposed procedure.  No further diagnostic evaluation is needed.       Prep time = 5 minutes  Visit time = 17 minutes  Documentation time= 15 minutes  -----------------------------------------------------  Total time spent on DOS = 37 minutes      Jaylin Rodgers DNP, RN, APRN  Preoperative Assessment Center  [facility] [place]  Phone: [phone]  Fax: [phone]

## 2021-04-29 NOTE — ANESTHESIA PREPROCEDURE EVALUATION
Anesthesia Pre-Procedure Evaluation    Patient: Roxane Lopes   MRN: [id number] : 1975        Preoperative Diagnosis: Abscess of coccyx (H) [M46.28]   Procedure : Procedure(s):  EXAM UNDER ANESTHESIA  Excision of sacral cyst     Past Medical History:   Diagnosis Date     Benign essential hypertension      Depression      DVT (deep venous thrombosis) (H) 2011    left leg, was on lovenox and coumadin but have been off since 2011     H/O tubal ligation           Obesity      Prediabetes       Past Surgical History:   Procedure Laterality Date     LAPAROSCOPIC CHOLECYSTECTOMY  10/9/2012    Procedure: LAPAROSCOPIC CHOLECYSTECTOMY;  Laparoscopic Cholecystectomy;  Surgeon: Martell Briscoe MD;  Location: [facility] OR     LAPAROSCOPIC TUBAL LIGATION        Allergies   Allergen Reactions     Chantix [Varenicline Tartrate] Anxiety      Social History     Tobacco Use     Smoking status: Former Smoker     Packs/day: 0.50     Years: 10.00     Pack years: 5.00     Types: Cigarettes, Other     Quit date: 10/16/2012     Years since quittin.5     Smokeless tobacco: Never Used     Tobacco comment: Vaping now    Substance Use Topics     Alcohol use: Yes     Comment: Maybe once a year      Wt Readings from Last 1 Encounters:   21 100.4 kg (221 lb 4.8 oz)        Anesthesia Evaluation   Pt has had prior anesthetic. Type: General.    No history of anesthetic complications       ROS/MED HX  ENT/Pulmonary: Comment: Hasn't received covid vaccines    (+) MESSI risk factors, hypertension, tobacco use, Past use,  (-) sleep apnea and recent URI   Neurologic:  - neg neurologic ROS     Cardiovascular:     (+) hypertension-----Previous cardiac testing   Echo: Date: Results:    Stress Test: Date: Results:    ECG Reviewed: Date:  Results:  NSR  Cath: Date: Results:      METS/Exercise Tolerance: >4 METS    Hematologic:     (+) History of blood clots, pt is not anticoagulated,  (-) history of blood transfusion    Musculoskeletal: Comment: Lumbar DDD - periodic low back pain      GI/Hepatic:    (-) GERD and liver disease   Renal/Genitourinary:  - neg Renal ROS     Endo: Comment: prediabetes    (+) Obesity,     Psychiatric/Substance Use:     (+) psychiatric history anxiety and depression     Infectious Disease:  - neg infectious disease ROS  (-) Recent Fever   Malignancy:  - neg malignancy ROS     Other: Comment: Abscess of coccyx           Physical Exam    Airway  airway exam normal      Mallampati: I   TM distance: > 3 FB   Neck ROM: full   Mouth opening: > 3 cm    Respiratory Devices and Support         Dental  no notable dental history         Cardiovascular   cardiovascular exam normal       Rhythm and rate: regular and normal     Pulmonary   pulmonary exam normal        breath sounds clear to auscultation           OUTSIDE LABS:  CBC:   Lab Results   Component Value Date    WBC 4.9 11/05/2020    WBC 5.5 03/12/2019    HGB 13.0 11/05/2020    HGB 12.4 03/12/2019    HCT 38.5 11/05/2020    HCT 36.9 03/12/2019     11/05/2020     03/12/2019     BMP:   Lab Results   Component Value Date     11/05/2020     12/10/2019    POTASSIUM 3.6 11/05/2020    POTASSIUM 3.5 12/10/2019    CHLORIDE 105 11/05/2020    CHLORIDE 109 12/10/2019    CO2 27 11/05/2020    CO2 27 12/10/2019    BUN 7 11/05/2020    BUN 11 12/10/2019    CR 0.58 11/05/2020    CR 0.64 12/10/2019    GLC 88 11/05/2020    GLC 83 12/10/2019     COAGS:   Lab Results   Component Value Date    INR 3.1 (A) 08/25/2011     POC: No results found for: BGM, HCG, HCGS  HEPATIC:   Lab Results   Component Value Date    ALBUMIN 3.9 11/05/2020    PROTTOTAL 7.4 11/05/2020    ALT 20 11/05/2020    AST 17 11/05/2020    ALKPHOS 74 11/05/2020    BILITOTAL 0.5 11/05/2020     OTHER:   Lab Results   Component Value Date    A1C 4.9 11/05/2020    RIZWAN 8.6 11/05/2020    LIPASE 77 10/17/2012    TSH 0.76 11/05/2020    T4 1.00 10/16/2014       Anesthesia Plan    ASA Status:  2    NPO Status:  NPO Appropriate    Anesthesia Type: MAC.     - Reason for MAC: Deep or markedly invasive procedure (G8)   Induction: Propofol.   Maintenance: N/A.        Consents    Anesthesia Plan(s) and associated risks, benefits, and realistic alternatives discussed. Questions answered and patient/representative(s) expressed understanding.     - Discussed with:  Patient    Use of blood products discussed: No .     Postoperative Care    Pain management: Multi-modal analgesia, Oral pain medications.   PONV prophylaxis: Ondansetron (or other 5HT-3), Dexamethasone or Solumedrol     Comments:              PAC Discussion and Assessment    ASA Classification: 3  Case is suitable for: ASC  Anesthetic techniques and relevant risks discussed: MAC with GA as backup                  PAC Resident/NP Anesthesia Assessment: Roxane Lopes 46 year old female scheduled for an EXAM UNDER ANESTHESIA (N/A Anus) Excision of sacral cyst (N/A Anus) on 4/30/21 by Dr. Charles in treatment of an abscess of the coccyx.  PAC referral for risk assessment and optimization for anesthesia with comorbid conditions of: hypertension, nicotine dependence, history of DVT, obesity, prediabetes, GERD, anxiety and depression.     Pre-operative considerations:  1.  Cardiac:  Functional status- METS >4.  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  She is on losartan for hypertension which she takes in the evenings.   Low risk surgery with 0.4% risk of major adverse cardiac event.   2.  Pulm:  MESSI risk: low.  She quit smoking in 2012, but is still vaping e-cigs.    3.  GI:  Risk of PONV score = 3.  If > 2, anti-emetic intervention recommended.  Denies GERD.  4. Heme:  History of DVT with unknown cause in 2011/2012.  Is no longer anticoagulated.  Family history of of maternal grandmother also having a DVT  with unknown cause.  No known family history of any blood clotting disorders.  VTE risk = 3%.  5. Endo:  She is obese with a BMI >30.    6. Pain:  She is taking a total of 5mg - 10mg oxycodone daily for pain management.  Morphine equivalent dosing = 7.5-15mg.        Virtual visit - Please refer to the physical examination documented by the anesthesiologist in the anesthesia record on the day of surgery      Patient is optimized and is acceptable candidate for the proposed procedure.  No further diagnostic evaluation is needed.       Jaylin Rodgers DNP, RN, APRN      Reviewed and Signed by PAC Mid-Level Provider/Resident  Mid-Level Provider/Resident: Jaylin Rodgers DNP, RN, APRN  Date: 4/29/21  Time: 1201                               MEENAKSHI Valencia CNP

## 2021-04-29 NOTE — H&P (VIEW-ONLY)
Pre-Operative H & P         Video-Visit Details    Type of service:  Video Visit    Patient verbally consented to video service today: YES      Video Start Time: 1105  Video End Time (time video stopped): 1122    Originating Location (pt. Location): Other parked in her car    Distant Location (provider location): provider's home    Mode of Communication:  Doximity      CC:  Preoperative exam to assess for increased cardiopulmonary risk while undergoing surgery and anesthesia.    Date of Encounter: 4/29/2021  Primary Care Physician:  Miguelina Bledsoe  Associated diagnosis: abscess of coccyx    HPI  Roxane Lopes is a 46 year old female who presents for pre-operative H & P in preparation for an EXAM UNDER ANESTHESIA (N/A Anus) Excision of sacral cyst (N/A Anus) on 4/30/21 by Dr. Charles at [facility].     Roxane Lopes 46 year old female with hypertension, nicotine dependence, history of DVT, obesity, prediabetes, GERD, anxiety and depression that has an abscess of the coccyx.  She started having coccyx pain about 9 weeks ago.  She presented to an urgent care for evaluation and was initially diagnosed with arthritis, but pain persisted.  She followed up with her primary care provider and was started on keflex for possible infection, but that wasn't helpful.  She later had an MRI which showed the coccyx abscess and was subsequently referred to Dr. Charles for surgical consultation.  She has been using ibuprofen and oxycodone for pain management as she continues to have pain with sitting or palpation of the area.  The above listed procedure has now been recommended for treatment.     History is obtained from the patient and the medical record.     Past Medical History  Past Medical History:   Diagnosis Date     Benign essential hypertension      Depression      DVT (deep venous thrombosis) (H) 04/2011    left leg, was on lovenox and coumadin but have been off since November 2011     H/O tubal  ligation     2006      Obesity      Prediabetes        Past Surgical History  Past Surgical History:   Procedure Laterality Date     LAPAROSCOPIC CHOLECYSTECTOMY  10/9/2012    Procedure: LAPAROSCOPIC CHOLECYSTECTOMY;  Laparoscopic Cholecystectomy;  Surgeon: Martell Briscoe MD;  Location: [facility] OR     LAPAROSCOPIC TUBAL LIGATION         Hx of Blood transfusions/reactions: none     Hx of abnormal bleeding or anti-platelet use: none    Menstrual history: Patient's last menstrual period was 04/07/2021 (approximate).:     Steroid use in the last year: none    Personal or FH with difficulty with Anesthesia:  none    Prior to Admission Medications  Current Outpatient Medications   Medication Sig Dispense Refill     cyclobenzaprine (FLEXERIL) 10 MG tablet TAKE 1 TABLET BY MOUTH 3 TIMES DAILY AS NEEDED FOR MUSCLE SPASMS (Patient taking differently: as needed ) 90 tablet 0     losartan (COZAAR) 50 MG tablet Take 1 tablet (50 mg) by mouth daily (Patient taking differently: Take 50 mg by mouth At Bedtime ) 90 tablet 0     oxyCODONE (ROXICODONE) 5 MG tablet Take 1 tablet (5 mg) by mouth every 6 hours as needed for severe pain 10 tablet 0     venlafaxine (EFFEXOR-XR) 150 MG 24 hr capsule Take 1 capsule (150 mg) by mouth daily (Patient taking differently: Take 150 mg by mouth At Bedtime ) 90 capsule 3     hydrOXYzine (ATARAX) 25 MG tablet Take 25 mg 2-3 times a day as needed for anxiety. **caution may cause sedation (Patient not taking: Reported on 4/29/2021) 90 tablet 1     ibuprofen (ADVIL/MOTRIN) 800 MG tablet Take 800 mg by mouth every 8 hours as needed for moderate pain  30 tablet 1     order for DME rollabout  3 month duration (Patient not taking: Reported on 4/20/2021) 1 Device 0     order for DME Medium short Aircast boot (Patient not taking: Reported on 4/20/2021) 1 Device 0       Allergies  Allergies   Allergen Reactions     Chantix [Varenicline Tartrate] Anxiety       Social History  Social History     Socioeconomic  History     Marital status:      Spouse name: Not on file     Number of children: 4     Years of education: Not on file     Highest education level: Not on file   Occupational History     Occupation: pharmacy tech   Social Needs     Financial resource strain: Not on file     Food insecurity     Worry: Not on file     Inability: Not on file     Transportation needs     Medical: Not on file     Non-medical: Not on file   Tobacco Use     Smoking status: Former Smoker     Packs/day: 0.50     Years: 10.00     Pack years: 5.00     Types: Cigarettes, Other     Quit date: 10/16/2012     Years since quittin.5     Smokeless tobacco: Never Used     Tobacco comment: Vaping now    Substance and Sexual Activity     Alcohol use: Yes     Comment: Maybe once a year     Drug use: No     Sexual activity: Yes     Partners: Male     Birth control/protection: Surgical     Comment:  one partner   Lifestyle     Physical activity     Days per week: Not on file     Minutes per session: Not on file     Stress: Not on file   Relationships     Social connections     Talks on phone: Not on file     Gets together: Not on file     Attends [employer] service: Not on file     Active member of club or organization: Not on file     Attends meetings of clubs or organizations: Not on file     Relationship status: Not on file     Intimate partner violence     Fear of current or ex partner: Not on file     Emotionally abused: Not on file     Physically abused: Not on file     Forced sexual activity: Not on file   Other Topics Concern     Parent/sibling w/ CABG, MI or angioplasty before 65F 55M? No   Social History Narrative     Not on file       Family History  Family History   Problem Relation Age of Onset     Diabetes Maternal Grandfather      Hypertension Maternal Grandfather      Hyperlipidemia Maternal Grandfather      Deep Vein Thrombosis Maternal Grandmother 32     Depression Mother      Unknown/Adopted Father      Diabetes Other       Unknown/Adopted Paternal Grandmother      C.A.D. No family hx of      Cerebrovascular Disease No family hx of      Anesthesia Reaction No family hx of      Arthritis No family hx of      Asthma No family hx of      Breast Cancer No family hx of      Cancer - colorectal No family hx of      Prostate Cancer No family hx of      Thyroid Disease No family hx of      Lipids No family hx of      Congenital Anomalies No family hx of              ROS/MED HISTORY OF  The complete review of systems is negative other than noted in the HPI or here.   ENT/Pulmonary: Comment: Hasn't received covid vaccines    (+) MESSI risk factors, hypertension, tobacco use, Past use,  (-) sleep apnea and recent URI   Neurologic:  - neg neurologic ROS     Cardiovascular:     (+) hypertension-----Previous cardiac testing   Echo: Date: Results:    Stress Test: Date: Results:    ECG Reviewed: Date: 2017 Results:  NSR  Cath: Date: Results:      METS/Exercise Tolerance: >4 METS    Hematologic:     (+) History of blood clots, pt is not anticoagulated,  (-) history of blood transfusion   Musculoskeletal: Comment: Lumbar DDD - periodic low back pain      GI/Hepatic:    (-) GERD and liver disease   Renal/Genitourinary:  - neg Renal ROS     Endo: Comment: prediabetes    (+) Obesity,     Psychiatric/Substance Use:     (+) psychiatric history anxiety and depression     Infectious Disease:  - neg infectious disease ROS  (-) Recent Fever   Malignancy:  - neg malignancy ROS     Other: Comment: Abscess of coccyx                                      0 lbs 0 oz  Data Unavailable   There is no height or weight on file to calculate BMI.       Physical Exam  Constitutional: Awake, alert, cooperative, no apparent distress, and appears stated age.  Neurologic: Awake, alert, oriented to name, place and time.   Neuropsychiatric: Calm, cooperative. Normal affect.     Labs: (personally reviewed)  Component      Latest Ref Rng & Units 11/5/2020   Sodium      133 - 144  mmol/L 136   Potassium      3.4 - 5.3 mmol/L 3.6   Chloride      94 - 109 mmol/L 105   Carbon Dioxide      20 - 32 mmol/L 27   Anion Gap      3 - 14 mmol/L 4   Glucose      70 - 99 mg/dL 88   Urea Nitrogen      7 - 30 mg/dL 7   Creatinine      0.52 - 1.04 mg/dL 0.58   GFR Estimate      >60 mL/min/1.73:m2 >90   GFR Estimate If Black      >60 mL/min/1.73:m2 >90   Calcium      8.5 - 10.1 mg/dL 8.6   Bilirubin Total      0.2 - 1.3 mg/dL 0.5   Albumin      3.4 - 5.0 g/dL 3.9   Protein Total      6.8 - 8.8 g/dL 7.4   Alkaline Phosphatase      40 - 150 U/L 74   ALT      0 - 50 U/L 20   AST      0 - 45 U/L 17   WBC      4.0 - 11.0 10e9/L 4.9   RBC Count      3.8 - 5.2 10e12/L 4.17   Hemoglobin      11.7 - 15.7 g/dL 13.0   Hematocrit      35.0 - 47.0 % 38.5   MCV      78 - 100 fl 92   MCH      26.5 - 33.0 pg 31.2   MCHC      31.5 - 36.5 g/dL 33.8   RDW      10.0 - 15.0 % 13.6   Platelet Count      150 - 450 10e9/L 332   Hemoglobin A1C      0 - 5.6 % 4.9   **creatinine, K+ and hgb ordered for DOS**        EK  NSR      Outside records reviewed from: Care Everywhere      ASSESSMENT and PLAN  Roxane COWART Moses 46 year old female scheduled for an EXAM UNDER ANESTHESIA (N/A Anus) Excision of sacral cyst (N/A Anus) on 21 by Dr. Charles in treatment of an abscess of the coccyx.  PAC referral for risk assessment and optimization for anesthesia with comorbid conditions of: hypertension, nicotine dependence, history of DVT, obesity, prediabetes, GERD, anxiety and depression.     Pre-operative considerations:  1.  Cardiac:  Functional status- METS >4.  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  She is on losartan for hypertension which she takes in the evenings.   Low risk surgery with 0.4% risk of major adverse cardiac event.   2.  Pulm:  MESSI risk: low.  She quit smoking in  2012, but is still vaping e-cigs.    3.  GI:  Risk of PONV score = 3.  If > 2, anti-emetic intervention recommended.  Denies GERD.  4. Heme:  History of DVT with unknown cause in 2011/2012.  Is no longer anticoagulated.  Family history of of maternal grandmother also having a DVT with unknown cause.  No known family history of any blood clotting disorders.  VTE risk = 3%.  5. Endo:  She is obese with a BMI >30.   6. Pain:  She is taking a total of 5mg - 10mg oxycodone daily for pain management.  Morphine equivalent dosing = 7.5-15mg.      Virtual visit - Please refer to the physical examination documented by the anesthesiologist in the anesthesia record on the day of surgery      Patient is optimized and is acceptable candidate for the proposed procedure.  No further diagnostic evaluation is needed.       Prep time = 5 minutes  Visit time = 17 minutes  Documentation time= 15 minutes  -----------------------------------------------------  Total time spent on DOS = 37 minutes      Jaylin Rodgers DNP, RN, APRN  Preoperative Assessment Center  [facility] [place] [facility]  Phone: [phone]  Fax: [phone]

## 2021-04-29 NOTE — PATIENT INSTRUCTIONS
Preparing for Your Surgery      Name:  Roxane Lopes   MRN:  [id number]   :  1975   Today's Date:  2021       Arriving for surgery:  Surgery date:  21  Arrival time:  09:20 am    Restrictions due to COVID 19:  One consistent visitor per patient is allowed.  The visitor will be allowed in the pre-op area.  Visitors are asked to leave the building during the surgery.  No ill visitors.  All visitors must wear face mask.    [employer] parking is available for anyone with mobility limitations or disabilities.  ([employer]  24 hours/ 7 days a week; [employer]  7 am- 3:30 pm, Mon- Fri)    Please come to:  [facility] [address] [address]  -  Proceed to the 5th floor to check into the Ambulatory Surgery Center.              >> There will be patient concierges on the 1st and 5th floor, for assistance or an escort, if you would like.              >> Please call [phone] with any questions.    What can I eat or drink?  -  You may eat and drink normally for up to 8 hours before your surgery.   -  You may have clear liquids until 4 hours before surgery.     Examples of clear liquids:  Water  Clear broth  Juices (apple, white grape, white cranberry  and cider) without pulp  Noncarbonated, powder based beverages  (lemonade and Manpreet-Aid)  Sodas (Sprite, 7-Up, ginger ale and seltzer)  Coffee or tea (without milk or cream)  Gatorade    -  No Alcohol for at least 24 hours before surgery     Which medicines can I take?  Hold Aspirin for 7 days before surgery.   Hold Multivitamins for 7 days before surgery.  Hold Supplements for 7 days before surgery.  Hold Ibuprofen (Advil, Motrin) for 1 day before surgery--unless otherwise directed by surgeon.  Hold Naproxen (Aleve) for 4 days before surgery.  -  PLEASE TAKE these medications the day of surgery:  Tylenol or oxycodone if needed; take morning medications.    How do I prepare myself?  - Please take 2  showers before surgery using Scrubcare or Hibiclens soap.    Use this soap only from the neck to your toes.     Leave the soap on your skin for one minute--then rinse thoroughly.      You may use your own shampoo and conditioner; no other hair products.   - Please remove all jewelry and body piercings.  - No lotions, deodorants or fragrance.  - No makeup or fingernail polish.   - Bring your ID and insurance card.    - All patients are required to have a Covid-19 test within 4 days of surgery/procedure.      -Patients will be contacted by the [facility] scheduling team within 1 week of surgery to make an appointment.      - Patients may call the Scheduling team at [phone] if they have not been scheduled within 4 days of  surgery.      ALL PATIENTS GOING HOME THE SAME DAY OF SURGERY ARE REQUIRED TO HAVE A RESPONSIBLE ADULT TO DRIVE AND BE IN ATTENDANCE WITH THEM FOR 24 HOURS FOLLOWING SURGERY.    IF THE RESPONSIBLE ADULT IS REQUIRED FOR POST OP TEACHING THE POST OP RN WILL ASK THEM TO COME BACK TO THE RECOVERY AREA.    Questions or Concerns:    - For any questions regarding the day of surgery or your hospital stay, please contact the Pre Admission Nursing Office at [phone].       - If you have health changes between today and your surgery please call your surgeon.       For questions after surgery please call your surgeons office.

## 2021-04-29 NOTE — PROGRESS NOTES
Roxane is a 46 year old who is being evaluated via a billable video visit.      How would you like to obtain your AVS? MyChart  If the video visit is dropped, the invitation should be resent by: Text to cell phone: [phone]  Will anyone else be joining your video visit? No    HPI       Review of Systems         Objective    Vitals - Patient Reported  Pain Score: Mild Pain (2)        Physical Exam     SHAHEEN Molina LPN

## 2021-04-30 ENCOUNTER — ANESTHESIA (OUTPATIENT)
Dept: SURGERY | Facility: AMBULATORY SURGERY CENTER | Age: 46
End: 2021-04-30

## 2021-04-30 ENCOUNTER — HOSPITAL ENCOUNTER (OUTPATIENT)
Facility: AMBULATORY SURGERY CENTER | Age: 46
Discharge: HOME | End: 2021-04-30
Attending: COLON & RECTAL SURGERY | Admitting: COLON & RECTAL SURGERY
Payer: COMMERCIAL

## 2021-04-30 VITALS
RESPIRATION RATE: 15 BRPM | DIASTOLIC BLOOD PRESSURE: 71 MMHG | WEIGHT: 220 LBS | BODY MASS INDEX: 34.53 KG/M2 | TEMPERATURE: 97.7 F | OXYGEN SATURATION: 99 % | HEART RATE: 73 BPM | SYSTOLIC BLOOD PRESSURE: 111 MMHG | HEIGHT: 67 IN

## 2021-04-30 DIAGNOSIS — M46.28 ABSCESS OF COCCYX (H): ICD-10-CM

## 2021-04-30 DIAGNOSIS — K62.89 RECTAL PAIN: ICD-10-CM

## 2021-04-30 PROCEDURE — 11404 EXC TR-EXT B9+MARG 3.1-4 CM: CPT

## 2021-04-30 PROCEDURE — 12032 INTMD RPR S/A/T/EXT 2.6-7.5: CPT

## 2021-04-30 PROCEDURE — 88304 TISSUE EXAM BY PATHOLOGIST: CPT | Performed by: PATHOLOGY

## 2021-04-30 RX ORDER — NALOXONE HYDROCHLORIDE 0.4 MG/ML
0.2 INJECTION, SOLUTION INTRAMUSCULAR; INTRAVENOUS; SUBCUTANEOUS
Status: DISCONTINUED | OUTPATIENT
Start: 2021-04-30 | End: 2021-05-01 | Stop reason: HOSPADM

## 2021-04-30 RX ORDER — ONDANSETRON 4 MG/1
4 TABLET, ORALLY DISINTEGRATING ORAL EVERY 30 MIN PRN
Status: DISCONTINUED | OUTPATIENT
Start: 2021-04-30 | End: 2021-05-01 | Stop reason: HOSPADM

## 2021-04-30 RX ORDER — FENTANYL CITRATE 50 UG/ML
INJECTION, SOLUTION INTRAMUSCULAR; INTRAVENOUS PRN
Status: DISCONTINUED | OUTPATIENT
Start: 2021-04-30 | End: 2021-04-30

## 2021-04-30 RX ORDER — ACETAMINOPHEN 325 MG/1
975 TABLET ORAL ONCE
Status: COMPLETED | OUTPATIENT
Start: 2021-04-30 | End: 2021-04-30

## 2021-04-30 RX ORDER — KETOROLAC TROMETHAMINE 30 MG/ML
INJECTION, SOLUTION INTRAMUSCULAR; INTRAVENOUS PRN
Status: DISCONTINUED | OUTPATIENT
Start: 2021-04-30 | End: 2021-04-30

## 2021-04-30 RX ORDER — GLYCOPYRROLATE 0.2 MG/ML
INJECTION, SOLUTION INTRAMUSCULAR; INTRAVENOUS PRN
Status: DISCONTINUED | OUTPATIENT
Start: 2021-04-30 | End: 2021-04-30

## 2021-04-30 RX ORDER — CEFAZOLIN SODIUM 2 G/50ML
2 SOLUTION INTRAVENOUS
Status: DISCONTINUED | OUTPATIENT
Start: 2021-04-30 | End: 2021-04-30 | Stop reason: HOSPADM

## 2021-04-30 RX ORDER — OXYCODONE HYDROCHLORIDE 5 MG/1
5 TABLET ORAL EVERY 6 HOURS PRN
Qty: 8 TABLET | Refills: 0 | Status: SHIPPED | OUTPATIENT
Start: 2021-04-30 | End: 2021-12-20

## 2021-04-30 RX ORDER — MEPERIDINE HYDROCHLORIDE 25 MG/ML
12.5 INJECTION INTRAMUSCULAR; INTRAVENOUS; SUBCUTANEOUS
Status: DISCONTINUED | OUTPATIENT
Start: 2021-04-30 | End: 2021-05-01 | Stop reason: HOSPADM

## 2021-04-30 RX ORDER — ONDANSETRON 2 MG/ML
4 INJECTION INTRAMUSCULAR; INTRAVENOUS EVERY 30 MIN PRN
Status: DISCONTINUED | OUTPATIENT
Start: 2021-04-30 | End: 2021-05-01 | Stop reason: HOSPADM

## 2021-04-30 RX ORDER — KETAMINE HYDROCHLORIDE 10 MG/ML
INJECTION, SOLUTION INTRAMUSCULAR; INTRAVENOUS PRN
Status: DISCONTINUED | OUTPATIENT
Start: 2021-04-30 | End: 2021-04-30

## 2021-04-30 RX ORDER — DEXAMETHASONE SODIUM PHOSPHATE 4 MG/ML
INJECTION, SOLUTION INTRA-ARTICULAR; INTRALESIONAL; INTRAMUSCULAR; INTRAVENOUS; SOFT TISSUE PRN
Status: DISCONTINUED | OUTPATIENT
Start: 2021-04-30 | End: 2021-04-30

## 2021-04-30 RX ORDER — ONDANSETRON 4 MG/1
4 TABLET, ORALLY DISINTEGRATING ORAL
Status: DISCONTINUED | OUTPATIENT
Start: 2021-04-30 | End: 2021-05-01 | Stop reason: HOSPADM

## 2021-04-30 RX ORDER — OXYCODONE HYDROCHLORIDE 5 MG/1
5 TABLET ORAL EVERY 4 HOURS PRN
Status: DISCONTINUED | OUTPATIENT
Start: 2021-04-30 | End: 2021-05-01 | Stop reason: HOSPADM

## 2021-04-30 RX ORDER — SODIUM CHLORIDE, SODIUM LACTATE, POTASSIUM CHLORIDE, CALCIUM CHLORIDE 600; 310; 30; 20 MG/100ML; MG/100ML; MG/100ML; MG/100ML
INJECTION, SOLUTION INTRAVENOUS CONTINUOUS PRN
Status: DISCONTINUED | OUTPATIENT
Start: 2021-04-30 | End: 2021-04-30

## 2021-04-30 RX ORDER — ACETAMINOPHEN 325 MG/1
650 TABLET ORAL 4 TIMES DAILY
Qty: 100 TABLET | Refills: 0 | Status: SHIPPED | OUTPATIENT
Start: 2021-04-30 | End: 2021-05-14

## 2021-04-30 RX ORDER — OXYCODONE HYDROCHLORIDE 5 MG/1
5-10 TABLET ORAL
Qty: 30 TABLET | Refills: 0 | Status: SHIPPED | OUTPATIENT
Start: 2021-04-30 | End: 2021-12-20

## 2021-04-30 RX ORDER — NALOXONE HYDROCHLORIDE 0.4 MG/ML
0.4 INJECTION, SOLUTION INTRAMUSCULAR; INTRAVENOUS; SUBCUTANEOUS
Status: DISCONTINUED | OUTPATIENT
Start: 2021-04-30 | End: 2021-05-01 | Stop reason: HOSPADM

## 2021-04-30 RX ORDER — CEFAZOLIN SODIUM 2 G/50ML
2 SOLUTION INTRAVENOUS SEE ADMIN INSTRUCTIONS
Status: DISCONTINUED | OUTPATIENT
Start: 2021-04-30 | End: 2021-04-30 | Stop reason: HOSPADM

## 2021-04-30 RX ORDER — PROPOFOL 10 MG/ML
INJECTION, EMULSION INTRAVENOUS PRN
Status: DISCONTINUED | OUTPATIENT
Start: 2021-04-30 | End: 2021-04-30

## 2021-04-30 RX ORDER — PROPOFOL 10 MG/ML
INJECTION, EMULSION INTRAVENOUS CONTINUOUS PRN
Status: DISCONTINUED | OUTPATIENT
Start: 2021-04-30 | End: 2021-04-30

## 2021-04-30 RX ORDER — ACETAMINOPHEN 325 MG/1
975 TABLET ORAL ONCE
Status: DISCONTINUED | OUTPATIENT
Start: 2021-04-30 | End: 2021-04-30 | Stop reason: HOSPADM

## 2021-04-30 RX ORDER — GABAPENTIN 300 MG/1
300 CAPSULE ORAL ONCE
Status: COMPLETED | OUTPATIENT
Start: 2021-04-30 | End: 2021-04-30

## 2021-04-30 RX ORDER — BUPIVACAINE HYDROCHLORIDE AND EPINEPHRINE 2.5; 5 MG/ML; UG/ML
INJECTION, SOLUTION INFILTRATION; PERINEURAL PRN
Status: DISCONTINUED | OUTPATIENT
Start: 2021-04-30 | End: 2021-04-30 | Stop reason: HOSPADM

## 2021-04-30 RX ORDER — LIDOCAINE HYDROCHLORIDE 20 MG/ML
INJECTION, SOLUTION INFILTRATION; PERINEURAL PRN
Status: DISCONTINUED | OUTPATIENT
Start: 2021-04-30 | End: 2021-04-30

## 2021-04-30 RX ORDER — ONDANSETRON 2 MG/ML
INJECTION INTRAMUSCULAR; INTRAVENOUS PRN
Status: DISCONTINUED | OUTPATIENT
Start: 2021-04-30 | End: 2021-04-30

## 2021-04-30 RX ORDER — ONDANSETRON 2 MG/ML
4 INJECTION INTRAMUSCULAR; INTRAVENOUS ONCE
Status: DISCONTINUED | OUTPATIENT
Start: 2021-04-30 | End: 2021-04-30 | Stop reason: HOSPADM

## 2021-04-30 RX ORDER — SODIUM CHLORIDE, SODIUM LACTATE, POTASSIUM CHLORIDE, CALCIUM CHLORIDE 600; 310; 30; 20 MG/100ML; MG/100ML; MG/100ML; MG/100ML
INJECTION, SOLUTION INTRAVENOUS CONTINUOUS
Status: DISCONTINUED | OUTPATIENT
Start: 2021-04-30 | End: 2021-05-01 | Stop reason: HOSPADM

## 2021-04-30 RX ORDER — FENTANYL CITRATE 50 UG/ML
25-50 INJECTION, SOLUTION INTRAMUSCULAR; INTRAVENOUS
Status: DISCONTINUED | OUTPATIENT
Start: 2021-04-30 | End: 2021-04-30 | Stop reason: HOSPADM

## 2021-04-30 RX ADMIN — KETAMINE HYDROCHLORIDE 30 MG: 10 INJECTION, SOLUTION INTRAMUSCULAR; INTRAVENOUS at 12:38

## 2021-04-30 RX ADMIN — Medication 50 MCG: at 13:13

## 2021-04-30 RX ADMIN — PROPOFOL 200 MG: 10 INJECTION, EMULSION INTRAVENOUS at 12:24

## 2021-04-30 RX ADMIN — FENTANYL CITRATE 100 MCG: 50 INJECTION, SOLUTION INTRAMUSCULAR; INTRAVENOUS at 12:16

## 2021-04-30 RX ADMIN — KETOROLAC TROMETHAMINE 30 MG: 30 INJECTION, SOLUTION INTRAMUSCULAR; INTRAVENOUS at 13:14

## 2021-04-30 RX ADMIN — ONDANSETRON 4 MG: 2 INJECTION INTRAMUSCULAR; INTRAVENOUS at 12:17

## 2021-04-30 RX ADMIN — GLYCOPYRROLATE 0.2 MG: 0.2 INJECTION, SOLUTION INTRAMUSCULAR; INTRAVENOUS at 12:17

## 2021-04-30 RX ADMIN — GABAPENTIN 300 MG: 300 CAPSULE ORAL at 09:31

## 2021-04-30 RX ADMIN — Medication 50 MCG: at 13:09

## 2021-04-30 RX ADMIN — Medication 50 MCG: at 13:08

## 2021-04-30 RX ADMIN — Medication 50 MCG: at 13:14

## 2021-04-30 RX ADMIN — CEFAZOLIN SODIUM 2 G: 2 SOLUTION INTRAVENOUS at 12:35

## 2021-04-30 RX ADMIN — ACETAMINOPHEN 975 MG: 325 TABLET ORAL at 09:31

## 2021-04-30 RX ADMIN — LIDOCAINE HYDROCHLORIDE 100 MG: 20 INJECTION, SOLUTION INFILTRATION; PERINEURAL at 12:22

## 2021-04-30 RX ADMIN — DEXAMETHASONE SODIUM PHOSPHATE 4 MG: 4 INJECTION, SOLUTION INTRA-ARTICULAR; INTRALESIONAL; INTRAMUSCULAR; INTRAVENOUS; SOFT TISSUE at 12:17

## 2021-04-30 RX ADMIN — Medication 50 MG: at 12:24

## 2021-04-30 RX ADMIN — SODIUM CHLORIDE, SODIUM LACTATE, POTASSIUM CHLORIDE, CALCIUM CHLORIDE: 600; 310; 30; 20 INJECTION, SOLUTION INTRAVENOUS at 12:08

## 2021-04-30 RX ADMIN — PROPOFOL 200 MCG/KG/MIN: 10 INJECTION, EMULSION INTRAVENOUS at 12:24

## 2021-04-30 ASSESSMENT — MIFFLIN-ST. JEOR: SCORE: 1680.54

## 2021-04-30 NOTE — ANESTHESIA CARE TRANSFER NOTE
Patient: Roxane Lopes    Procedure(s):  EXAM UNDER ANESTHESIA  Excision of coccygeal cyst  Proctoscopy    Diagnosis: Abscess of coccyx (H) [M46.28]  Diagnosis Additional Information: No value filed.    Anesthesia Type:   General     Note:    Oropharynx: oropharynx clear of all foreign objects and spontaneously breathing  Level of Consciousness: awake  Oxygen Supplementation: face mask  Level of Supplemental Oxygen (L/min / FiO2): 6  Independent Airway: airway patency satisfactory and stable  Dentition: dentition unchanged  Vital Signs Stable: post-procedure vital signs reviewed and stable  Report to RN Given: handoff report given  Patient transferred to: PACU    Handoff Report: Identifed the Patient, Identified the Reponsible Provider, Reviewed the pertinent medical history, Discussed the surgical course, Reviewed Intra-OP anesthesia mangement and issues during anesthesia, Set expectations for post-procedure period and Allowed opportunity for questions and acknowledgement of understanding      Vitals: (Last set prior to Anesthesia Care Transfer)  CRNA VITALS  4/30/2021 1307 - 4/30/2021 1341      4/30/2021             Pulse:  78    SpO2:  98 %    Resp Rate (observed):  (!) 3        Electronically Signed By: MEENAKSHI Thakkar CRNA  April 30, 2021  1:41 PM

## 2021-04-30 NOTE — DISCHARGE INSTRUCTIONS
[facility]  Home Care Following Anesthesia  For 24 hours after surgery:  1. Get plenty of rest.  A responsible adult must stay with you for at least 24 hours after you leave the surgery center.  2. Do not drive or use heavy equipment.  If you have weakness or tingling, don't drive or use heavy equipment until this feeling goes away.   3. Do not drink alcohol.   4. Avoid strenuous or risky activities.  Ask for help when climbing stairs.  5. You may feel lightheaded.  IF so, sit for a few minutes before standing.  Have someone help you get up.   6. If you have nausea (feel sick to your stomach): Drink only clear liquids such as apple juice, ginger ale, broth or 7-Up.  Rest may also help.  Be sure to drink enough fluids.  Move to a regular diet as you feel able.   7. You may have a slight fever.  Call the doctor if your fever is over 100 F (37.7 C) (taken under the tongue) or lasts longer than 24 hours.  8. You may have a dry mouth, a sore throat, muscle aches or trouble sleeping. These should go away after 24 hours.  9. Do not make important or legal decisions.   10. It is recommended to avoid smoking.               Tips for taking pain medications  To get the best pain relief possible, remember these points:    Take pain medications as directed, before pain becomes severe.    Pain medication can upset your stomach: taking it with food may help.    Constipation is a common side effect of pain medication. Drink plenty of  fluids.    Eat foods high in fiber. Take a stool softener if recommended by your doctor or pharmacist.    Do not drink alcohol, drive or operate machinery while taking pain medications.    Ask about other ways to control pain, such as with heat, ice or relaxation.    Tylenol/Acetaminophen Consumption  To help encourage the safe use of acetaminophen, the makers of TYLENOL  have lowered the maximum daily dose for single-ingredient Extra Strength TYLENOL   (acetaminophen) products sold in the [place] from 8 pills per day (4,000 mg) to 6 pills per day (3,000 mg). The dosing interval has also changed from 2 pills every 4-6 hours to 2 pills every 6 hours.    If you feel your pain relief is insufficient, you may take Tylenol/Acetaminophen in addition to your narcotic pain medication.     Be careful not to exceed 3,000 mg of Tylenol/Acetaminophen in a 24 hour period from all sources.    If you are taking extra strength Tylenol/acetaminophen (500 mg), the maximum dose is 6 tablets in 24 hours.    If you are taking regular strength acetaminophen (325 mg), the maximum dose is 9 tablets in 24 hours.    Call a doctor for any of the followin. Signs of infection (fever, growing tenderness at the surgery site, a large amount of drainage or bleeding, severe pain, foul-smelling drainage, redness, swelling).  2. It has been over 8 to 10 hours since surgery and you are still not able to urinate (pass water).  3. Headache for over 24 hours.  4. Numbness, tingling or weakness the day after surgery (if you had spinal anesthesia).  5. Signs of Covid-19 infection (temperature over 100 degrees, shortness of breath, cough, loss of taste/smell, generalized body aches, persistent headache, chills, sore throat, nausea/vomiting/diarrhea)  Your doctor is:       Dr. Leandro Charles, Colon Rectal: [phone]               Or dial [phone] and ask for the resident on call for:  Colon Rectal  For emergency care, call the:  [facility]:  [phone] (TTY for hearing impaired: [phone])

## 2021-04-30 NOTE — BRIEF OP NOTE
[facility]    Brief Operative Note    Pre-operative diagnosis: Abscess of coccyx (H) [M46.28]  Post-operative diagnosis Same as pre-operative diagnosis    Procedure: Procedure(s):  EXAM UNDER ANESTHESIA  Excision of coccygeal cyst  Proctoscopy  Surgeon: Surgeon(s) and Role:     * Leandro Charles MD - Primary  Anesthesia: Monitor Anesthesia Care   Estimated blood loss: Minimal  Drains: None  Specimens:   ID Type Source Tests Collected by Time Destination   A : Coccygeal cyst Tissue Anus SURGICAL PATHOLOGY EXAM Leandro Charles MD 4/30/2021 12:58 PM      Findings:   sebum-filled cyst just inferior/deep to cocyx.  Complications: None.  Implants: * No implants in log *

## 2021-04-30 NOTE — ANESTHESIA POSTPROCEDURE EVALUATION
Patient: Roxane Lopes    Procedure(s):  EXAM UNDER ANESTHESIA  Excision of coccygeal cyst  Proctoscopy    Diagnosis:Abscess of coccyx (H) [M46.28]  Diagnosis Additional Information: No value filed.    Anesthesia Type:  General    Note:  Disposition: Outpatient   Postop Pain Control: Uneventful            Sign Out: Well controlled pain   PONV: No   Neuro/Psych: Uneventful            Sign Out: Acceptable/Baseline neuro status   Airway/Respiratory: Uneventful            Sign Out: Acceptable/Baseline resp. status   CV/Hemodynamics: Uneventful            Sign Out: Acceptable CV status   Other NRE: NONE   DID A NON-ROUTINE EVENT OCCUR? No           Last vitals:  Vitals:    04/30/21 1342 04/30/21 1345 04/30/21 1400   BP: 106/61 100/65 111/71   Pulse: 75 73 73   Resp: 14 14 15   Temp: 36.2  C (97.2  F)  36.5  C (97.7  F)   SpO2: 100% 97% 99%       Last vitals prior to Anesthesia Care Transfer:  CRNA VITALS  4/30/2021 1307 - 4/30/2021 1407      4/30/2021             Pulse:  78    SpO2:  98 %    Resp Rate (observed):  (!) 3          Electronically Signed By: Bryan Ross DO  April 30, 2021  3:37 PM

## 2021-05-01 NOTE — OP NOTE
Procedure Date: 04/30/2021    PREOPERATIVE DIAGNOSIS:  Coccygeal cyst.    POSTOPERATIVE DIAGNOSIS:  Coccygeal cyst.    PROCEDURE:  Examination under anesthesia with rigid proctoscopy and excision of coccygeal cyst.      INDICATIONS FOR PROCEDURE:  A 46-year-old woman who presented with low pelvic pain.  An MRI confirmed presence of a benign-appearing cyst adjacent and inferior to the coccyx.  Although the MRI characteristics were benign, I advised resection because it was symptomatic. I discussed the risks and alternatives in detail with the patient.  I answered all of her questions to her stated satisfaction.  She expressed understanding and provided written informed consent.    SURGEON:  Maral Richardson    DESCRIPTION OF PROCEDURE:  After induction of adequate endotracheal anesthesia, the patient was placed in the prone jackknife position with the buttocks taped apart.  Examination under anesthesia confirmed the presence of an easily palpable soft mass just to the left of the midline immediately beneath the levators and just inferior to the coccyx.  Rigid proctoscopy was performed and the rectum was washed out with dilute Betadine. The perianal skin was then prepped and draped in a sterile fashion.  Timeout was performed.  We made a transverse incision immediately inferior to the coccyx and worked our way down to the cyst through the soft tissue.  The cyst was very thin walled and we inadvertently entered it due to its soft, thin nature.  This was unavoidable due to the very fragile nature of the cyst's very thin wall. The cyst contained thick white sebum with no evidence of infection.  We then worked circumferentially around the cyst wall.  It extended from immediately anterior to the coccyx along the inferior border of the levators to the perianal skin.  The cyst was removed in its entirety, taking care to fully preserve the levator ani muscles and the anal sphincter as we excised the cyst.  The cyst was 4 cm in  diameter. The wound was then irrigated with dilute Betadine followed by water.  There was no bleeding.  The 4 cm skin incision was closed with interrupted 3-0 Vicryl subdermal stitches followed by 4-0 Monocryl and Dermabond.  There was no blood loss.  Sponge, needle and instrument counts were reported as correct at the conclusion of the case x2.  The patient tolerated the procedure well and without evident complications.    Leadnro Charles MD        D: 2021   T: 2021   MT: DOREEN    Name:     GTHONEY  MRN:      [id number]        Account:        [id number]   :      1975           Procedure Date: 2021     Document: [id number]

## 2021-05-04 LAB — COPATH REPORT: NORMAL

## 2021-05-09 ENCOUNTER — MYC REFILL (OUTPATIENT)
Dept: PEDIATRICS | Facility: CLINIC | Age: 46
End: 2021-05-09

## 2021-05-09 DIAGNOSIS — M62.838 SPASM OF MUSCLE: ICD-10-CM

## 2021-05-10 RX ORDER — CYCLOBENZAPRINE HCL 10 MG
10 TABLET ORAL 3 TIMES DAILY PRN
Qty: 90 TABLET | Refills: 0 | Status: SHIPPED | OUTPATIENT
Start: 2021-05-10 | End: 2021-07-06

## 2021-05-10 NOTE — TELEPHONE ENCOUNTER
Routing refill request to provider for review/approval because:  Drug not on the [facility] refill protocol     Shala Melissa RN, BSN, CMSRN  [facility]

## 2021-05-12 ENCOUNTER — OFFICE VISIT (OUTPATIENT)
Dept: SURGERY | Facility: CLINIC | Age: 46
End: 2021-05-12
Payer: COMMERCIAL

## 2021-05-12 VITALS
DIASTOLIC BLOOD PRESSURE: 91 MMHG | BODY MASS INDEX: 35.16 KG/M2 | SYSTOLIC BLOOD PRESSURE: 124 MMHG | HEIGHT: 67 IN | HEART RATE: 98 BPM | OXYGEN SATURATION: 97 % | WEIGHT: 224 LBS

## 2021-05-12 DIAGNOSIS — Z09 FOLLOW-UP EXAMINATION AFTER COLORECTAL SURGERY: Primary | ICD-10-CM

## 2021-05-12 DIAGNOSIS — L72.0 EPIDERMOID CYST: ICD-10-CM

## 2021-05-12 PROCEDURE — 99024 POSTOP FOLLOW-UP VISIT: CPT | Performed by: NURSE PRACTITIONER

## 2021-05-12 ASSESSMENT — MIFFLIN-ST. JEOR: SCORE: 1698.69

## 2021-05-12 ASSESSMENT — PAIN SCALES - GENERAL: PAINLEVEL: NO PAIN (0)

## 2021-05-12 NOTE — PROGRESS NOTES
"Colon and Rectal Surgery Postoperative Clinic Note    RE: Roxane Lopes  : 1975  ASHLEE: 2021    Roxane Lopes is a very pleasant 46 year old female with coccygeal cyst who is now status post examination under anesthesia with proctoscopy with a rigid proctoscopy and excision of coccygeal cyst     FINAL DIAGNOSIS:   Coccygeal cyst:   - Epidermoid cyst   - Negative for malignancy    Interval history: Roxane has been doing well. He has minimal discomfort and is taking ibuprofen only. No fevers or chills. No drainage. No difficulty with bowel movements.     Physical Examination: Exam was chaperoned by Lauren Warren MA   BP (!) 124/91 (BP Location: Left arm, Patient Position: Sitting, Cuff Size: Adult Regular)   Pulse 98   Ht 5' 7\"   Wt 224 lb   SpO2 97%   BMI 35.08 kg/m    General: alert, oriented, in no acute distress, sitting comfortably  HEENT: mucous membranes moist  Perianal external examination:  Incision at the kirsten cleft is well approximated. Surgical glue has come off. No erythema or drainage.    Digital rectal examination: Was deferred.    Anoscopy: Was deferred.    Assessment/Plan:  46 year old female status post examination under anesthesia with proctoscopy with a rigid proctoscopy and excision of coccygeal cyst with pathology showing epidermoid cyst. She is recovering well. No significant pain. Incision is healing well. She can follow up as needed. Asked her to return to clinic in 3-4 weeks if not fully healed or with any concerns in the meantime. Patient's questions were answered to her stated satisfaction and she is in agreement with this plan.        Medical history:  Past Medical History:   Diagnosis Date     Benign essential hypertension      Depression      DVT (deep venous thrombosis) (H) 2011    left leg, was on lovenox and coumadin but have been off since 2011     H/O tubal ligation           Obesity      Prediabetes        Surgical history:  Past Surgical History: "   Procedure Laterality Date     EXAM UNDER ANESTHESIA ANUS N/A 4/30/2021    Procedure: EXAM UNDER ANESTHESIA;  Surgeon: Leandro Charles MD;  Location: [facility] OR     EXCISE LESION RECTUM N/A 4/30/2021    Procedure: Excision of coccygeal cyst;  Surgeon: Leandro Charles MD;  Location: [facility] OR     LAPAROSCOPIC CHOLECYSTECTOMY  10/9/2012    Procedure: LAPAROSCOPIC CHOLECYSTECTOMY;  Laparoscopic Cholecystectomy;  Surgeon: Martell Briscoe MD;  Location: [facility] OR     LAPAROSCOPIC TUBAL LIGATION       PROCTOSCOPY N/A 4/30/2021    Procedure: Proctoscopy;  Surgeon: Leandro Charles MD;  Location: [facility] OR       Problem list:  Patient Active Problem List    Diagnosis Date Noted     Abscess of coccyx (H) 04/27/2021     Priority: Medium     Added automatically from request for surgery [id number]       Class 1 obesity due to excess calories with serious comorbidity and body mass index (BMI) of 33.0 to 33.9 in adult 11/05/2020     Priority: Medium     Galactorrhea of left breast 10/16/2014     Priority: Medium     Generalized hyperhidrosis 10/16/2014     Priority: Medium     Fatigue 10/16/2014     Priority: Medium     CARDIOVASCULAR SCREENING; LDL GOAL LESS THAN 160 10/16/2014     Priority: Medium     Depression with anxiety 10/16/2014     Priority: Medium     Gastroesophageal reflux disease without esophagitis 06/11/2013     Priority: Medium     Anxiety 08/15/2012     Priority: Medium     Thumb injury 05/22/2012     Priority: Medium     Prediabetes 03/23/2012     Priority: Medium     Venous insufficiency of leg-left 02/03/2012     Priority: Medium     PTSD (post-traumatic stress disorder) 09/07/2011     Priority: Medium     Tobacco use disorder 09/07/2011     Priority: Medium     Varicose veins 08/03/2011     Priority: Medium     Left leg pain 08/03/2011     Priority: Medium     DVT (deep venous thrombosis) (H) 08/03/2011     Priority: Medium       Medications:  Current Outpatient Medications   Medication Sig Dispense Refill      acetaminophen (TYLENOL) 325 MG tablet Take 2 tablets (650 mg) by mouth 4 times daily for 14 days Alternate with ibuprofen (ADVIL/MOTRIN), IF ordered. 100 tablet 0     cyclobenzaprine (FLEXERIL) 10 MG tablet Take 1 tablet (10 mg) by mouth 3 times daily as needed for muscle spasms 90 tablet 0     hydrOXYzine (ATARAX) 25 MG tablet Take 25 mg 2-3 times a day as needed for anxiety. **caution may cause sedation 90 tablet 1     ibuprofen (ADVIL/MOTRIN) 800 MG tablet Take 800 mg by mouth every 8 hours as needed for moderate pain  30 tablet 1     losartan (COZAAR) 50 MG tablet Take 1 tablet (50 mg) by mouth daily (Patient taking differently: Take 50 mg by mouth At Bedtime ) 90 tablet 0     order for DME rollabout  3 month duration 1 Device 0     order for DME Medium short Aircast boot 1 Device 0     oxyCODONE (ROXICODONE) 5 MG tablet Take 1 tablet (5 mg) by mouth every 6 hours as needed for severe pain 8 tablet 0     oxyCODONE (ROXICODONE) 5 MG tablet Take 1-2 tablets (5-10 mg) by mouth every 3 hours as needed for severe pain 30 tablet 0     venlafaxine (EFFEXOR-XR) 150 MG 24 hr capsule Take 1 capsule (150 mg) by mouth daily (Patient taking differently: Take 150 mg by mouth At Bedtime ) 90 capsule 3       Allergies:  Allergies   Allergen Reactions     Chantix [Varenicline Tartrate] Anxiety       Family history:  Family History   Problem Relation Age of Onset     Diabetes Maternal Grandfather      Hypertension Maternal Grandfather      Hyperlipidemia Maternal Grandfather      Deep Vein Thrombosis Maternal Grandmother 32     Depression Mother      Unknown/Adopted Father      Diabetes Other      Unknown/Adopted Paternal Grandmother      C.A.D. No family hx of      Cerebrovascular Disease No family hx of      Anesthesia Reaction No family hx of      Arthritis No family hx of      Asthma No family hx of      Breast Cancer No family hx of      Cancer - colorectal No family hx of      Prostate Cancer No family hx of      Thyroid  "Disease No family hx of      Lipids No family hx of      Congenital Anomalies No family hx of        Social history:  Social History     Tobacco Use     Smoking status: Former Smoker     Packs/day: 0.50     Years: 10.00     Pack years: 5.00     Types: Cigarettes, Other     Quit date: 10/16/2012     Years since quittin.5     Smokeless tobacco: Never Used     Tobacco comment: Vaping now    Substance Use Topics     Alcohol use: Yes     Comment: Maybe once a year     Marital status: .    Nursing Notes:   Lauren Warren  2021  9:01 AM  Signed  Chief Complaint   Patient presents with     Post-op Visit     post-op 2021       Vitals:    21 0859   BP: (!) 124/91   BP Location: Left arm   Patient Position: Sitting   Cuff Size: Adult Regular   Pulse: 98   SpO2: 97%   Weight: 101.6 kg (224 lb)   Height: 1.702 m (5' 7\")       Body mass index is 35.08 kg/m .    Lauren Warren MA         10 minutes spent on the date of the encounter doing chart review, history and exam, documentation and further activities as noted above.   This is a postop visit.    MEENAKSHI Matthews, NP-C  Colon and Rectal Surgery  [facility]    This note was created using speech recognition software and may contain unintended word substitutions.    "

## 2021-05-12 NOTE — LETTER
"2021       RE: Roxane Lopes  [address] [place] [id number]     Dear Colleague,    Thank you for referring your patient, Roxane Lopes, to the [facility] [place] [facility]. Please see a copy of my visit note below.    Colon and Rectal Surgery Postoperative Clinic Note    RE: Roxane Lopes  : 1975  ASHLEE: 2021    Roxane Lopes is a very pleasant 46 year old female with coccygeal cyst who is now status post examination under anesthesia with proctoscopy with a rigid proctoscopy and excision of coccygeal cyst     FINAL DIAGNOSIS:   Coccygeal cyst:   - Epidermoid cyst   - Negative for malignancy    Interval history: Roxane has been doing well. He has minimal discomfort and is taking ibuprofen only. No fevers or chills. No drainage. No difficulty with bowel movements.     Physical Examination: Exam was chaperoned by Lauren Warren MA   BP (!) 124/91 (BP Location: Left arm, Patient Position: Sitting, Cuff Size: Adult Regular)   Pulse 98   Ht 5' 7\"   Wt 224 lb   SpO2 97%   BMI 35.08 kg/m    General: alert, oriented, in no acute distress, sitting comfortably  HEENT: mucous membranes moist  Perianal external examination:  Incision at the kirsten cleft is well approximated. Surgical glue has come off. No erythema or drainage.    Digital rectal examination: Was deferred.    Anoscopy: Was deferred.    Assessment/Plan:  46 year old female status post examination under anesthesia with proctoscopy with a rigid proctoscopy and excision of coccygeal cyst with pathology showing epidermoid cyst. She is recovering well. No significant pain. Incision is healing well. She can follow up as needed. Asked her to return to clinic in 3-4 weeks if not fully healed or with any concerns in the meantime. Patient's questions were answered to her stated satisfaction and she is in agreement with this plan.        Medical " history:  Past Medical History:   Diagnosis Date     Benign essential hypertension      Depression      DVT (deep venous thrombosis) (H) 04/2011    left leg, was on lovenox and coumadin but have been off since November 2011     H/O tubal ligation     2006      Obesity      Prediabetes        Surgical history:  Past Surgical History:   Procedure Laterality Date     EXAM UNDER ANESTHESIA ANUS N/A 4/30/2021    Procedure: EXAM UNDER ANESTHESIA;  Surgeon: Leandro Charles MD;  Location: [facility] OR     EXCISE LESION RECTUM N/A 4/30/2021    Procedure: Excision of coccygeal cyst;  Surgeon: Leandro Charles MD;  Location: [facility] OR     LAPAROSCOPIC CHOLECYSTECTOMY  10/9/2012    Procedure: LAPAROSCOPIC CHOLECYSTECTOMY;  Laparoscopic Cholecystectomy;  Surgeon: Martell Briscoe MD;  Location: [facility] OR     LAPAROSCOPIC TUBAL LIGATION       PROCTOSCOPY N/A 4/30/2021    Procedure: Proctoscopy;  Surgeon: Leandro Charles MD;  Location: [facility] OR       Problem list:  Patient Active Problem List    Diagnosis Date Noted     Abscess of coccyx (H) 04/27/2021     Priority: Medium     Added automatically from request for surgery [id number]       Class 1 obesity due to excess calories with serious comorbidity and body mass index (BMI) of 33.0 to 33.9 in adult 11/05/2020     Priority: Medium     Galactorrhea of left breast 10/16/2014     Priority: Medium     Generalized hyperhidrosis 10/16/2014     Priority: Medium     Fatigue 10/16/2014     Priority: Medium     CARDIOVASCULAR SCREENING; LDL GOAL LESS THAN 160 10/16/2014     Priority: Medium     Depression with anxiety 10/16/2014     Priority: Medium     Gastroesophageal reflux disease without esophagitis 06/11/2013     Priority: Medium     Anxiety 08/15/2012     Priority: Medium     Thumb injury 05/22/2012     Priority: Medium     Prediabetes 03/23/2012     Priority: Medium     Venous insufficiency of leg-left 02/03/2012     Priority: Medium     PTSD (post-traumatic stress disorder) 09/07/2011      Priority: Medium     Tobacco use disorder 09/07/2011     Priority: Medium     Varicose veins 08/03/2011     Priority: Medium     Left leg pain 08/03/2011     Priority: Medium     DVT (deep venous thrombosis) (H) 08/03/2011     Priority: Medium       Medications:  Current Outpatient Medications   Medication Sig Dispense Refill     acetaminophen (TYLENOL) 325 MG tablet Take 2 tablets (650 mg) by mouth 4 times daily for 14 days Alternate with ibuprofen (ADVIL/MOTRIN), IF ordered. 100 tablet 0     cyclobenzaprine (FLEXERIL) 10 MG tablet Take 1 tablet (10 mg) by mouth 3 times daily as needed for muscle spasms 90 tablet 0     hydrOXYzine (ATARAX) 25 MG tablet Take 25 mg 2-3 times a day as needed for anxiety. **caution may cause sedation 90 tablet 1     ibuprofen (ADVIL/MOTRIN) 800 MG tablet Take 800 mg by mouth every 8 hours as needed for moderate pain  30 tablet 1     losartan (COZAAR) 50 MG tablet Take 1 tablet (50 mg) by mouth daily (Patient taking differently: Take 50 mg by mouth At Bedtime ) 90 tablet 0     order for DME rollabout  3 month duration 1 Device 0     order for DME Medium short Aircast boot 1 Device 0     oxyCODONE (ROXICODONE) 5 MG tablet Take 1 tablet (5 mg) by mouth every 6 hours as needed for severe pain 8 tablet 0     oxyCODONE (ROXICODONE) 5 MG tablet Take 1-2 tablets (5-10 mg) by mouth every 3 hours as needed for severe pain 30 tablet 0     venlafaxine (EFFEXOR-XR) 150 MG 24 hr capsule Take 1 capsule (150 mg) by mouth daily (Patient taking differently: Take 150 mg by mouth At Bedtime ) 90 capsule 3       Allergies:  Allergies   Allergen Reactions     Chantix [Varenicline Tartrate] Anxiety       Family history:  Family History   Problem Relation Age of Onset     Diabetes Maternal Grandfather      Hypertension Maternal Grandfather      Hyperlipidemia Maternal Grandfather      Deep Vein Thrombosis Maternal Grandmother 32     Depression Mother      Unknown/Adopted Father      Diabetes Other       "Unknown/Adopted Paternal Grandmother      C.A.D. No family hx of      Cerebrovascular Disease No family hx of      Anesthesia Reaction No family hx of      Arthritis No family hx of      Asthma No family hx of      Breast Cancer No family hx of      Cancer - colorectal No family hx of      Prostate Cancer No family hx of      Thyroid Disease No family hx of      Lipids No family hx of      Congenital Anomalies No family hx of        Social history:  Social History     Tobacco Use     Smoking status: Former Smoker     Packs/day: 0.50     Years: 10.00     Pack years: 5.00     Types: Cigarettes, Other     Quit date: 10/16/2012     Years since quittin.5     Smokeless tobacco: Never Used     Tobacco comment: Vaping now    Substance Use Topics     Alcohol use: Yes     Comment: Maybe once a year     Marital status: .    Nursing Notes:   Lauren Warren  2021  9:01 AM  Signed  Chief Complaint   Patient presents with     Post-op Visit     post-op 2021       Vitals:    21 0859   BP: (!) 124/91   BP Location: Left arm   Patient Position: Sitting   Cuff Size: Adult Regular   Pulse: 98   SpO2: 97%   Weight: 101.6 kg (224 lb)   Height: 1.702 m (5' 7\")       Body mass index is 35.08 kg/m .    Lauren Warren MA      10 minutes spent on the date of the encounter doing chart review, history and exam, documentation and further activities as noted above.   This is a postop visit.    MEENAKSHI Matthews, NP-C  Colon and Rectal Surgery  [facility]    This note was created using speech recognition software and may contain unintended word substitutions.      "

## 2021-05-12 NOTE — NURSING NOTE
"Chief Complaint   Patient presents with     Post-op Visit     post-op 4/30/2021       Vitals:    05/12/21 0859   BP: (!) 124/91   BP Location: Left arm   Patient Position: Sitting   Cuff Size: Adult Regular   Pulse: 98   SpO2: 97%   Weight: 101.6 kg (224 lb)   Height: 1.702 m (5' 7\")       Body mass index is 35.08 kg/m .    Lauren Warren MA    "

## 2021-06-20 NOTE — LETTER
Letter by Nissen, Lynette, RN at      Author: Nissen, Lynette, RN Service: -- Author Type: --    Filed:  Encounter Date: 5/11/2020 Status: (Other)       5/11/2020        Roxane Lopes  [address] [facility] [address]    This letter provides a written record that you were tested for COVID-19 on 5/10/20.     Your result was negative.    This means that we didnt find the virus that causes COVID-19 in your sample. A test may show negative when you do actually have the virus. This can happen when the virus is in the early stages of infection, before you feel illness symptoms.    Even if you dont have symptoms, they may still appear. For safety, its very important to follow these rules.    Keep yourself away from others (self-isolation):      Stay home. Dont go to work, school or anywhere else.     Stay in your own room (and use your own bathroom), if you can.    Stay away from others in your home. No hugging, kissing or shaking hands. No visitors.    Clean high touch surfaces often (doorknobs, counters, handles, etc.). Use a household cleaning spray or wipes.    Cover your mouth and nose with a mask, tissue or washcloth to avoid spreading germs.    Wash your hands and face often with soap and water.    Stay in self-isolation until you meet ALL of the guidelines below:    1. You have had no fever for at least 72 hours (that is 3 full days of no fever without the use of medicine that reduces fevers), AND  2. other symptoms (such as cough, shortness of breath) have gotten better, AND  3. at least 10 days have passed since your symptoms first appeared.    Going back to work  Check with your employer for any guidelines to follow for going back to work.    Employers: This document serves as formal notice that your employee tested negative for COVID-19, as of the testing date shown above.    For questions regarding this letter or your Negative COVID-19 result, call [phone] between 8A to 6:30P (M-F) and 10A to 6:30P  (weekends).

## 2021-07-05 DIAGNOSIS — I10 ESSENTIAL HYPERTENSION: ICD-10-CM

## 2021-07-06 ENCOUNTER — ANCILLARY PROCEDURE (OUTPATIENT)
Dept: GENERAL RADIOLOGY | Facility: CLINIC | Age: 46
End: 2021-07-06
Attending: PREVENTIVE MEDICINE
Payer: COMMERCIAL

## 2021-07-06 ENCOUNTER — OFFICE VISIT (OUTPATIENT)
Dept: FAMILY MEDICINE | Facility: CLINIC | Age: 46
End: 2021-07-06
Payer: COMMERCIAL

## 2021-07-06 VITALS
WEIGHT: 225.2 LBS | OXYGEN SATURATION: 99 % | HEART RATE: 100 BPM | TEMPERATURE: 98.5 F | SYSTOLIC BLOOD PRESSURE: 128 MMHG | DIASTOLIC BLOOD PRESSURE: 90 MMHG | BODY MASS INDEX: 35.27 KG/M2

## 2021-07-06 DIAGNOSIS — S99.921A FOOT INJURY, RIGHT, INITIAL ENCOUNTER: ICD-10-CM

## 2021-07-06 DIAGNOSIS — S99.921A FOOT INJURY, RIGHT, INITIAL ENCOUNTER: Primary | ICD-10-CM

## 2021-07-06 PROCEDURE — 73610 X-RAY EXAM OF ANKLE: CPT | Mod: RT | Performed by: RADIOLOGY

## 2021-07-06 PROCEDURE — 73630 X-RAY EXAM OF FOOT: CPT | Mod: RT | Performed by: RADIOLOGY

## 2021-07-06 PROCEDURE — 99214 OFFICE O/P EST MOD 30 MIN: CPT | Performed by: PREVENTIVE MEDICINE

## 2021-07-06 RX ORDER — METHOCARBAMOL 500 MG/1
500-1000 TABLET, FILM COATED ORAL 4 TIMES DAILY PRN
Qty: 30 TABLET | Refills: 0 | Status: SHIPPED | OUTPATIENT
Start: 2021-07-06 | End: 2021-12-20

## 2021-07-06 RX ORDER — LOSARTAN POTASSIUM 50 MG/1
50 TABLET ORAL DAILY
Qty: 90 TABLET | Refills: 0 | Status: SHIPPED | OUTPATIENT
Start: 2021-07-06 | End: 2021-10-20

## 2021-07-06 ASSESSMENT — PAIN SCALES - GENERAL: PAINLEVEL: MILD PAIN (3)

## 2021-07-06 NOTE — PATIENT INSTRUCTIONS
At [facility], we strive to deliver an exceptional experience to you, every time we see you. If you receive a survey, please complete it as we do value your feedback.  If you have MyChart, you can expect to receive results automatically within 24 hours of their completion.  Your provider will send a note interpreting your results as well.   If you do not have MyChart, you should receive your results in about a week by mail.    Your care team:                            Family Medicine Internal Medicine   MD Charles Felton MD Shantel Branch-Fleming, MD Srinivasa Vaka, MD Katya Belousova, PAIVONE Boo, APRN CNP    Shai Garcia, MD Pediatrics   Med Acuña, PAIVONE Salas, CNP MD Marianela Verduzco APRN CNP   MD Fay Hunter MD Deborah Mielke, MD Hanh Gould, APRN [facility]      Clinic hours: Monday - Thursday 7 am-6 pm; Fridays 7 am-5 pm.   Urgent care: Monday - Friday 10 am- 8 pm; Saturday and Sunday 9 am-5 pm.    Clinic: [phone]       [facility]: Monday - Thursday 8 am - 7 pm; Friday 8 am - 6 pm  [facility]: [phone]     Use [email or web address] for 24/7 diagnosis and treatment of dozens of conditions.

## 2021-07-06 NOTE — RESULT ENCOUNTER NOTE
Roxane,     X rays of the ankle are not showing any acute fractures or dislocations.     Please do not hesitate to call us at [phone] if you have any questions or concerns.    Thank you,    Monique Lindo MD MPH

## 2021-07-06 NOTE — TELEPHONE ENCOUNTER
BP Readings from Last 3 Encounters:   05/12/21 (!) 124/91   04/30/21 111/71   04/20/21 (!) 130/100     Fabiana Alicia BSN, RN

## 2021-07-06 NOTE — RESULT ENCOUNTER NOTE
Roxane,     X rays of the foot are not showing any acute fractures or dislocations.  Please follow up with Podiatry if symptoms are not improving in one week.     Please do not hesitate to call us at [phone] if you have any questions or concerns.    Thank you,    Monique Lindo MD MPH

## 2021-07-06 NOTE — PROGRESS NOTES
"      Assessment & Plan     Foot injury, right, initial encounter  -await final results of X rays  -Medium short air cast boot provided, used in the past and tolerated well  - XR Foot Right G/E 3 Views  - XR Ankle Right G/E 3 Views  - Orthopedic  Referral  - methocarbamol (ROBAXIN) 500 MG tablet  Dispense: 30 tablet; Refill: 0  - Ankle/Foot Bracing Supplies Order  -declined work note for restrictions       35 minutes spent on the date of the encounter doing chart review, history and exam, documentation and further activities per the note       BMI:   Estimated body mass index is 35.27 kg/m  as calculated from the following:    Height as of 5/12/21: 1.702 m (5' 7\").    Weight as of this encounter: 102.2 kg (225 lb 3.2 oz).     Return in about 1 week (around 7/13/2021) if symptoms worsen or fail to improve.    Monique Lindo MD MPH    [facility] CECIL Betts is a 46 year old who presents for the following health issues:    HPI     Musculoskeletal problem/pain      Was being followed by Podiatry for a non displaced closed cuboid fracture of the right foot about a year ago.   Not being able to bend her lateral 3 toes  Pain with ambulation  10 days ago was camping and stepped into a hole, no twisting  Instant pain  No major edema  Some paresthesias  All day on her feet  3/10 now  Hurts more when on her feet, ambulating or standing  No chest pain  No shortness of breath   Pain is not preventing sleep      Review of Systems   Constitutional, HEENT, cardiovascular, pulmonary, gi and gu systems are negative, except as otherwise noted.      Objective    BP (!) 128/90 (BP Location: Left arm, Patient Position: Sitting, Cuff Size: Adult Large)   Pulse 100   Temp 98.5  F (36.9  C) (Tympanic)   Wt 102.2 kg (225 lb 3.2 oz)   LMP 06/20/2021 (Exact Date)   SpO2 99%   BMI 35.27 kg/m    Body mass index is 35.27 kg/m .  Physical Exam   GENERAL APPEARANCE: healthy, alert and no distress, " ambulating with a limp   EYES: Eyes grossly normal to inspection and conjunctivae and sclerae normal  RESP: lungs clear to auscultation - no rales, rhonchi or wheezes  CV: regular rates and rhythm, normal S1 S2, no S3 or S4 and no murmur, click or rub  SKIN: no suspicious lesions or rashes  NEURO: Normal strength and tone, mentation intact and speech normal  PSYCH: mentation appears normal  Right foot and ankle: No gross deformities. Posterior tibial and dorsalis pedis pulses are palpable. Tender and edema along the anterior aspect of the ankle, tender and edema along the lateral foot margin. Pain with movement, sensation intact.     X rays right foot and ankle:  My independent review of the images is not showing any definite fractures. There may be a linear fracture of the 5 th middle phalanx. Final radiology reading is pending.  Monique Lindo MD MPH         DME (Durable Medical Equipment) Orders and Documentation  Orders Placed This Encounter   Procedures     Ankle/Foot Bracing Supplies Order      The patient was assessed and it was determined the patient is in need of the following listed DME Supplies/Equipment. Please complete supporting documentation below to demonstrate medical necessity.      Ankle/Foot Bracing Supplies Documentation  Patient requires the use of the ordered bracing device due to following medical need/condition: foot injury

## 2021-07-20 ENCOUNTER — TELEPHONE (OUTPATIENT)
Dept: FAMILY MEDICINE | Facility: CLINIC | Age: 46
End: 2021-07-20

## 2021-07-20 NOTE — TELEPHONE ENCOUNTER
Pharmacy request for acetaminophen-codeine (TYLENOL #3) 300-30 MG per tablet (Discontinued). Med not on active med list.

## 2021-07-20 NOTE — TELEPHONE ENCOUNTER
Will see Podiatry tomorrow and they can decide if further refills are appropriate.  Thank you,  Monique Lindo MD MPH

## 2021-07-21 ENCOUNTER — OFFICE VISIT (OUTPATIENT)
Dept: PODIATRY | Facility: CLINIC | Age: 46
End: 2021-07-21
Payer: COMMERCIAL

## 2021-07-21 VITALS
HEART RATE: 88 BPM | DIASTOLIC BLOOD PRESSURE: 90 MMHG | WEIGHT: 225 LBS | BODY MASS INDEX: 35.24 KG/M2 | SYSTOLIC BLOOD PRESSURE: 140 MMHG

## 2021-07-21 DIAGNOSIS — S99.921A FOOT INJURY, RIGHT, INITIAL ENCOUNTER: ICD-10-CM

## 2021-07-21 PROCEDURE — 99203 OFFICE O/P NEW LOW 30 MIN: CPT | Performed by: PODIATRIST

## 2021-07-21 NOTE — PATIENT INSTRUCTIONS
We wish you continued good healing. If you have any questions or concerns, please do not hesitate to contact us at [phone]    [employer] (secure e-mail communication and access to your chart) to send a message or to make an appointment.    Please remember to call and schedule a follow up appointment if one was recommended at your earliest convenience.     +++OF MARCH 2020+++ LOCATION AND HOURS HAVE CHANGED    PLEASE CALL CLINICS TO VERIFY DAYS AND TIMES  PODIATRY CLINIC HOURS  TELEPHONE NUMBER    Dr. Blaine APPLEPRIA [facility]        Clinics:  [facility]   Tuesday 1PM-6PM  [place]  Wednesday 745AM-330PM  [place]  Thursday/Friday 745AM-230PM  [facility] APPOINT[facility]  [phone]    [place] APPOINTMENTS  [phone]          If you need a medication refill, please contact us you may need lab work and/or a follow up visit prior to your refill (i.e. Antifungal medications).    If MRI needed please call Imaging at [phone] or [phone]    HOW DO I GET MY KNEE SCOOTER? Knee scooters can be picked up at ANY Medical Supply stores with your knee scooter Prescription.  OR    Bring your signed prescription to an [facility] showroom.

## 2021-07-21 NOTE — PROGRESS NOTES
Subjective:    Patient seen as a new patient consult from Dr. Lindo and is seen today  for right foot sprain.  Approximately 20 days ago she was camping.  Wearing a pair of crocs and foot fell in hole.  Had pain afterwards with slight swelling.  Denied ecchymosis or erythema.  Denies numbness.  Most of her pain is in the area of the second third and fourth MTPJ's.  Also some pain dorsal lateral foot.  She is noted no weakness.  She was given an Aircast.  This helps when she is walking.  She has history of cuboid fracture and injury to lateral foot in 2019.  The site healed and was feeling well.      ROS:  A 10-point review of systems was performed and is positive for that noted in the HPI and as seen above.  All other areas are negative.          Allergies   Allergen Reactions     Chantix [Varenicline Tartrate] Anxiety       Current Outpatient Medications   Medication Sig Dispense Refill     hydrOXYzine (ATARAX) 25 MG tablet Take 25 mg 2-3 times a day as needed for anxiety. **caution may cause sedation 90 tablet 1     ibuprofen (ADVIL/MOTRIN) 800 MG tablet Take 800 mg by mouth every 8 hours as needed for moderate pain  30 tablet 1     losartan (COZAAR) 50 MG tablet Take 1 tablet (50 mg) by mouth daily 90 tablet 0     methocarbamol (ROBAXIN) 500 MG tablet Take 1-2 tablets (500-1,000 mg) by mouth 4 times daily as needed for muscle spasms 30 tablet 0     order for DME rollabout  3 month duration 1 Device 0     order for DME Medium short Aircast boot 1 Device 0     oxyCODONE (ROXICODONE) 5 MG tablet Take 1 tablet (5 mg) by mouth every 6 hours as needed for severe pain 8 tablet 0     oxyCODONE (ROXICODONE) 5 MG tablet Take 1-2 tablets (5-10 mg) by mouth every 3 hours as needed for severe pain 30 tablet 0     venlafaxine (EFFEXOR-XR) 150 MG 24 hr capsule Take 1 capsule (150 mg) by mouth daily (Patient taking differently: Take 150 mg by mouth At Bedtime ) 90 capsule 3       Patient Active Problem List   Diagnosis      Varicose veins     Left leg pain     DVT (deep venous thrombosis) (H)     PTSD (post-traumatic stress disorder)     Tobacco use disorder     Venous insufficiency of leg-left     Prediabetes     Thumb injury     Anxiety     Gastroesophageal reflux disease without esophagitis     Galactorrhea of left breast     Generalized hyperhidrosis     Fatigue     CARDIOVASCULAR SCREENING; LDL GOAL LESS THAN 160     Depression with anxiety     Class 1 obesity due to excess calories with serious comorbidity and body mass index (BMI) of 33.0 to 33.9 in adult     Abscess of coccyx (H)       Past Medical History:   Diagnosis Date     Benign essential hypertension      Depression      DVT (deep venous thrombosis) (H) 04/2011    left leg, was on lovenox and coumadin but have been off since November 2011     H/O tubal ligation     2006      Obesity      Prediabetes        Past Surgical History:   Procedure Laterality Date     EXAM UNDER ANESTHESIA ANUS N/A 4/30/2021    Procedure: EXAM UNDER ANESTHESIA;  Surgeon: Leandro Charles MD;  [place] [facility] [place]     EXCISE LESION RECTUM N/A 4/30/2021    Procedure: Excision of coccygeal cyst;  Surgeon: Leandro Charles MD;  [place] [facility] [place]     LAPAROSCOPIC CHOLECYSTECTOMY  10/9/2012    Procedure: LAPAROSCOPIC CHOLECYSTECTOMY;  Laparoscopic Cholecystectomy;  Surgeon: Martell Briscoe MD;  Location: [facility] OR     LAPAROSCOPIC TUBAL LIGATION       PROCTOSCOPY N/A 4/30/2021    Procedure: Proctoscopy;  Surgeon: Leandro Charles MD;  [place] [facility] [place]       Family History   Problem Relation Age of Onset     Diabetes Maternal Grandfather      Hypertension Maternal Grandfather      Hyperlipidemia Maternal Grandfather      Deep Vein Thrombosis Maternal Grandmother 32     Depression Mother      Unknown/Adopted Father      Diabetes Other      Unknown/Adopted Paternal Grandmother      C.A.D. No family hx of      Cerebrovascular Disease No family hx of      Anesthesia Reaction No family hx of      Arthritis No  family hx of      Asthma No family hx of      Breast Cancer No family hx of      Cancer - colorectal No family hx of      Prostate Cancer No family hx of      Thyroid Disease No family hx of      Lipids No family hx of      Congenital Anomalies No family hx of        Social History     Tobacco Use     Smoking status: Former Smoker     Packs/day: 0.50     Years: 10.00     Pack years: 5.00     Types: Cigarettes, Other     Quit date: 10/16/2012     Years since quittin.7     Smokeless tobacco: Never Used     Tobacco comment: Vaping now    Substance Use Topics     Alcohol use: Yes     Comment: Maybe once a year         Exam:    Vitals: BP (!) 140/90   Pulse 88   Wt 102.1 kg (225 lb)   BMI 35.24 kg/m    BMI: Body mass index is 35.24 kg/m .  Height: Data Unavailable    Constitutional/ general:  Pt is in no apparent distress, appears well-nourished.  Cooperative with history and physical exam.     Psych:  The patient answered questions appropriately.  Normal affect.  Seems to have reasonable expectations, in terms of treatment.     Eyes:  Visual scanning/ tracking without deficit.     Ears:  Response to auditory stimuli is normal.  No hearing aid devices.  Auricles in proper alignment.     Lymphatic:  Popliteal lymph nodes not enlarged.     Lungs:  Non labored breathing, non labored speech. No cough.  No audible wheezing. Even, quiet breathing.       Vascular:  positive pedal pulses bilaterally for both the DP and PT arteries.  CFT < 3 sec.  negative ankle edema.  positive pedal hair growth.    Neuro:  Alert and oriented x 3. Coordinated gait.  Light touch sensation is intact to the L4, L5, S1 distributions. No obvious deficits.  No evidence of neurological-based weakness, spasticity, or contracture in the lower extremities.      Derm: Normal texture and turgor.  No erythema, ecchymosis, or cyanosis.      Musculoskeletal:    Lower extremity muscle strength is normal.   No gross deformities.   Normal arch with weight  bearing.  Muscle strength 5/5 in all compartments.  No pain with stressing any tendons.  Normal ROM all forefoot and rearfoot joints.  Anterior drawer equal and symmetrical bilateral.  Inversion equal and symmetrical bilateral.         negative ecchymosis  negative erythema  Slight  pain at lateral TMTJs or styloid process.  Minimal pain medial tarsometatarsal joints  Minimal pain calcaneocuboid joint.  negative Achilles or calcaneal tubercle pain  negative medial ankle pain  negative pain AITF ligament  negative pain over ATFL/CFL  negative pain with stressing or palpation peroneal tendons  negative peroneal subluxation  negative pain over medial or lateral malleoli  Pain noted 345 MTPJ's dorsal and plantar.  Dorsal pain is worse.  No subluxation.  No pain second or first MTPJ.  Just slight edema.  Extensor and flexor tendons intact.    Radiographic Exam:  X-Ray Findings:  I personally reviewed the films.  Unremarkable    Assessment:    Right forefoot turf toe injury  Right lateral foot sprain    Plan:  X-rays personally reviewed.  Discussed etiology and treatment options with the patient in detail.  Explained to patient that she has had a turf toe injury and that this will be slow to heal.  We discussed mechanism.  Also discussed she has sprained lateral tarsometatarsal joint and somewhat calcaneocuboid joint.  Reassured patient I see no signs of any fracture.  She will be careful not to reinjure this.  When this starts to feel better she will graduate into an ankle brace we gave her 1 today.  We also discussed that she may wear a good stiff shoe around the house and I made suggestions.  She may do gentle range of motion here.  Discussed icing and elevating.  We will give this tincture of time.  RTC as needed.  Thank you for allowing me participate in the care of this patient.        Blaine Mendes, IVETTE, FACFAS

## 2021-07-21 NOTE — LETTER
7/21/2021         RE: Roxane Lopes  [address] [place] [phone]        Dear Colleague,    Thank you for referring your patient, Roxane Lopes, to the [facility]. Please see a copy of my visit note below.    Subjective:    Patient seen as a new patient consult from Dr. Lindo and is seen today  for right foot sprain.  Approximately 20 days ago she was camping.  Wearing a pair of crocs and foot fell in hole.  Had pain afterwards with slight swelling.  Denied ecchymosis or erythema.  Denies numbness.  Most of her pain is in the area of the second third and fourth MTPJ's.  Also some pain dorsal lateral foot.  She is noted no weakness.  She was given an Aircast.  This helps when she is walking.  She has history of cuboid fracture and injury to lateral foot in 2019.  The site healed and was feeling well.      ROS:  A 10-point review of systems was performed and is positive for that noted in the HPI and as seen above.  All other areas are negative.          Allergies   Allergen Reactions     Chantix [Varenicline Tartrate] Anxiety       Current Outpatient Medications   Medication Sig Dispense Refill     hydrOXYzine (ATARAX) 25 MG tablet Take 25 mg 2-3 times a day as needed for anxiety. **caution may cause sedation 90 tablet 1     ibuprofen (ADVIL/MOTRIN) 800 MG tablet Take 800 mg by mouth every 8 hours as needed for moderate pain  30 tablet 1     losartan (COZAAR) 50 MG tablet Take 1 tablet (50 mg) by mouth daily 90 tablet 0     methocarbamol (ROBAXIN) 500 MG tablet Take 1-2 tablets (500-1,000 mg) by mouth 4 times daily as needed for muscle spasms 30 tablet 0     order for DME rollabout  3 month duration 1 Device 0     order for DME Medium short Aircast boot 1 Device 0     oxyCODONE (ROXICODONE) 5 MG tablet Take 1 tablet (5 mg) by mouth every 6 hours as needed for severe pain 8 tablet 0     oxyCODONE (ROXICODONE) 5 MG tablet Take 1-2 tablets (5-10 mg) by mouth every 3 hours  as needed for severe pain 30 tablet 0     venlafaxine (EFFEXOR-XR) 150 MG 24 hr capsule Take 1 capsule (150 mg) by mouth daily (Patient taking differently: Take 150 mg by mouth At Bedtime ) 90 capsule 3       Patient Active Problem List   Diagnosis     Varicose veins     Left leg pain     DVT (deep venous thrombosis) (H)     PTSD (post-traumatic stress disorder)     Tobacco use disorder     Venous insufficiency of leg-left     Prediabetes     Thumb injury     Anxiety     Gastroesophageal reflux disease without esophagitis     Galactorrhea of left breast     Generalized hyperhidrosis     Fatigue     CARDIOVASCULAR SCREENING; LDL GOAL LESS THAN 160     Depression with anxiety     Class 1 obesity due to excess calories with serious comorbidity and body mass index (BMI) of 33.0 to 33.9 in adult     Abscess of coccyx (H)       Past Medical History:   Diagnosis Date     Benign essential hypertension      Depression      DVT (deep venous thrombosis) (H) 04/2011    left leg, was on lovenox and coumadin but have been off since November 2011     H/O tubal ligation     2006      Obesity      Prediabetes        Past Surgical History:   Procedure Laterality Date     EXAM UNDER ANESTHESIA ANUS N/A 4/30/2021    Procedure: EXAM UNDER ANESTHESIA;  Surgeon: Leandro Charles MD;  Location: [facility] OR     EXCISE LESION RECTUM N/A 4/30/2021    Procedure: Excision of coccygeal cyst;  Surgeon: Leandro Charles MD;  Location: [facility] OR     LAPAROSCOPIC CHOLECYSTECTOMY  10/9/2012    Procedure: LAPAROSCOPIC CHOLECYSTECTOMY;  Laparoscopic Cholecystectomy;  Surgeon: Martell Briscoe MD;  Location: [facility] OR     LAPAROSCOPIC TUBAL LIGATION       PROCTOSCOPY N/A 4/30/2021    Procedure: Proctoscopy;  Surgeon: Leandro Charles MD;  Location: [facility] OR       Family History   Problem Relation Age of Onset     Diabetes Maternal Grandfather      Hypertension Maternal Grandfather      Hyperlipidemia Maternal Grandfather      Deep Vein Thrombosis Maternal  Grandmother 32     Depression Mother      Unknown/Adopted Father      Diabetes Other      Unknown/Adopted Paternal Grandmother      C.A.D. No family hx of      Cerebrovascular Disease No family hx of      Anesthesia Reaction No family hx of      Arthritis No family hx of      Asthma No family hx of      Breast Cancer No family hx of      Cancer - colorectal No family hx of      Prostate Cancer No family hx of      Thyroid Disease No family hx of      Lipids No family hx of      Congenital Anomalies No family hx of        Social History     Tobacco Use     Smoking status: Former Smoker     Packs/day: 0.50     Years: 10.00     Pack years: 5.00     Types: Cigarettes, Other     Quit date: 10/16/2012     Years since quittin.7     Smokeless tobacco: Never Used     Tobacco comment: Vaping now    Substance Use Topics     Alcohol use: Yes     Comment: Maybe once a year         Exam:    Vitals: BP (!) 140/90   Pulse 88   Wt 102.1 kg (225 lb)   BMI 35.24 kg/m    BMI: Body mass index is 35.24 kg/m .  Height: Data Unavailable    Constitutional/ general:  Pt is in no apparent distress, appears well-nourished.  Cooperative with history and physical exam.     Psych:  The patient answered questions appropriately.  Normal affect.  Seems to have reasonable expectations, in terms of treatment.     Eyes:  Visual scanning/ tracking without deficit.     Ears:  Response to auditory stimuli is normal.  No hearing aid devices.  Auricles in proper alignment.     Lymphatic:  Popliteal lymph nodes not enlarged.     Lungs:  Non labored breathing, non labored speech. No cough.  No audible wheezing. Even, quiet breathing.       Vascular:  positive pedal pulses bilaterally for both the DP and PT arteries.  CFT < 3 sec.  negative ankle edema.  positive pedal hair growth.    Neuro:  Alert and oriented x 3. Coordinated gait.  Light touch sensation is intact to the L4, L5, S1 distributions. No obvious deficits.  No evidence of neurological-based  weakness, spasticity, or contracture in the lower extremities.      Derm: Normal texture and turgor.  No erythema, ecchymosis, or cyanosis.      Musculoskeletal:    Lower extremity muscle strength is normal.   No gross deformities.   Normal arch with weight bearing.  Muscle strength 5/5 in all compartments.  No pain with stressing any tendons.  Normal ROM all forefoot and rearfoot joints.  Anterior drawer equal and symmetrical bilateral.  Inversion equal and symmetrical bilateral.         negative ecchymosis  negative erythema  Slight  pain at lateral TMTJs or styloid process.  Minimal pain medial tarsometatarsal joints  Minimal pain calcaneocuboid joint.  negative Achilles or calcaneal tubercle pain  negative medial ankle pain  negative pain AITF ligament  negative pain over ATFL/CFL  negative pain with stressing or palpation peroneal tendons  negative peroneal subluxation  negative pain over medial or lateral malleoli  Pain noted 345 MTPJ's dorsal and plantar.  Dorsal pain is worse.  No subluxation.  No pain second or first MTPJ.  Just slight edema.  Extensor and flexor tendons intact.    Radiographic Exam:  X-Ray Findings:  I personally reviewed the films.  Unremarkable    Assessment:    Right forefoot turf toe injury  Right lateral foot sprain    Plan:  X-rays personally reviewed.  Discussed etiology and treatment options with the patient in detail.  Explained to patient that she has had a turf toe injury and that this will be slow to heal.  We discussed mechanism.  Also discussed she has sprained lateral tarsometatarsal joint and somewhat calcaneocuboid joint.  Reassured patient I see no signs of any fracture.  She will be careful not to reinjure this.  When this starts to feel better she will graduate into an ankle brace we gave her 1 today.  We also discussed that she may wear a good stiff shoe around the house and I made suggestions.  She may do gentle range of motion here.  Discussed icing and elevating.   We will give this tincture of time.  RTC as needed.  Thank you for allowing me participate in the care of this patient.        Blaine Mendes DPM, FACFAS          Again, thank you for allowing me to participate in the care of your patient.        Sincerely,        Blaine Mendes DPM

## 2021-11-09 ENCOUNTER — DOCUMENTATION ONLY (OUTPATIENT)
Dept: LAB | Facility: OTHER | Age: 46
End: 2021-11-09
Payer: COMMERCIAL

## 2021-11-09 DIAGNOSIS — R73.03 PREDIABETES: Primary | ICD-10-CM

## 2021-11-09 DIAGNOSIS — Z13.29 SCREENING FOR THYROID DISORDER: ICD-10-CM

## 2021-11-09 DIAGNOSIS — Z13.1 SCREENING FOR DIABETES MELLITUS (DM): ICD-10-CM

## 2021-11-09 DIAGNOSIS — Z13.0 SCREENING FOR DISORDER OF BLOOD AND BLOOD-FORMING ORGANS: ICD-10-CM

## 2021-11-09 DIAGNOSIS — Z13.6 CARDIOVASCULAR SCREENING; LDL GOAL LESS THAN 130: ICD-10-CM

## 2021-11-09 DIAGNOSIS — I10 HTN, GOAL BELOW 140/90: ICD-10-CM

## 2021-11-09 NOTE — PROGRESS NOTES
Roxane Lopes has an upcoming lab appointment:11/10/21    Future Appointments   Date Time Provider Department Center   11/10/2021 10:30 AM [facility]     Patient is scheduled for the following lab(s): no orders available    There is no order available. Please review and place either future orders or HMPO (Review of Health Maintenance Protocol Orders), as appropriate.    Health Maintenance Due   Topic     ANNUAL REVIEW OF HM ORDERS      HIV SCREENING      HEPATITIS C SCREENING      Yola Alicia

## 2021-11-10 ENCOUNTER — LAB (OUTPATIENT)
Dept: LAB | Facility: CLINIC | Age: 46
End: 2021-11-10
Payer: COMMERCIAL

## 2021-11-10 DIAGNOSIS — R73.03 PREDIABETES: ICD-10-CM

## 2021-11-10 DIAGNOSIS — Z13.6 CARDIOVASCULAR SCREENING; LDL GOAL LESS THAN 130: ICD-10-CM

## 2021-11-10 DIAGNOSIS — Z11.59 NEED FOR HEPATITIS C SCREENING TEST: ICD-10-CM

## 2021-11-10 DIAGNOSIS — Z13.29 SCREENING FOR THYROID DISORDER: ICD-10-CM

## 2021-11-10 DIAGNOSIS — Z13.0 SCREENING FOR DISORDER OF BLOOD AND BLOOD-FORMING ORGANS: ICD-10-CM

## 2021-11-10 DIAGNOSIS — I10 HTN, GOAL BELOW 140/90: ICD-10-CM

## 2021-11-10 DIAGNOSIS — Z11.4 SCREENING FOR HIV (HUMAN IMMUNODEFICIENCY VIRUS): ICD-10-CM

## 2021-11-10 DIAGNOSIS — Z13.1 SCREENING FOR DIABETES MELLITUS (DM): ICD-10-CM

## 2021-11-10 LAB
ALBUMIN SERPL-MCNC: 3.9 G/DL (ref 3.4–5)
ALP SERPL-CCNC: 77 U/L (ref 40–150)
ALT SERPL W P-5'-P-CCNC: 20 U/L (ref 0–50)
ANION GAP SERPL CALCULATED.3IONS-SCNC: 2 MMOL/L (ref 3–14)
AST SERPL W P-5'-P-CCNC: 15 U/L (ref 0–45)
BILIRUB SERPL-MCNC: 0.6 MG/DL (ref 0.2–1.3)
BUN SERPL-MCNC: 10 MG/DL (ref 7–30)
CALCIUM SERPL-MCNC: 9.5 MG/DL (ref 8.5–10.1)
CHLORIDE BLD-SCNC: 106 MMOL/L (ref 94–109)
CHOLEST SERPL-MCNC: 219 MG/DL
CO2 SERPL-SCNC: 28 MMOL/L (ref 20–32)
CREAT SERPL-MCNC: 0.66 MG/DL (ref 0.52–1.04)
CREAT UR-MCNC: 131 MG/DL
ERYTHROCYTE [DISTWIDTH] IN BLOOD BY AUTOMATED COUNT: 13.9 % (ref 10–15)
FASTING STATUS PATIENT QL REPORTED: YES
GFR SERPL CREATININE-BSD FRML MDRD: >90 ML/MIN/1.73M2
GLUCOSE BLD-MCNC: 93 MG/DL (ref 70–99)
HBA1C MFR BLD: 5 % (ref 0–5.6)
HCT VFR BLD AUTO: 38.6 % (ref 35–47)
HCV AB SERPL QL IA: NONREACTIVE
HDLC SERPL-MCNC: 56 MG/DL
HGB BLD-MCNC: 12.8 G/DL (ref 11.7–15.7)
HIV 1+2 AB+HIV1 P24 AG SERPL QL IA: NONREACTIVE
LDLC SERPL CALC-MCNC: 144 MG/DL
MCH RBC QN AUTO: 31.3 PG (ref 26.5–33)
MCHC RBC AUTO-ENTMCNC: 33.2 G/DL (ref 31.5–36.5)
MCV RBC AUTO: 94 FL (ref 78–100)
MICROALBUMIN UR-MCNC: 12 MG/L
MICROALBUMIN/CREAT UR: 9.16 MG/G CR (ref 0–25)
NONHDLC SERPL-MCNC: 163 MG/DL
PLATELET # BLD AUTO: 323 10E3/UL (ref 150–450)
POTASSIUM BLD-SCNC: 4.9 MMOL/L (ref 3.4–5.3)
PROT SERPL-MCNC: 7.6 G/DL (ref 6.8–8.8)
RBC # BLD AUTO: 4.09 10E6/UL (ref 3.8–5.2)
SODIUM SERPL-SCNC: 136 MMOL/L (ref 133–144)
TRIGL SERPL-MCNC: 96 MG/DL
TSH SERPL DL<=0.005 MIU/L-ACNC: 0.98 MU/L (ref 0.4–4)
WBC # BLD AUTO: 6.2 10E3/UL (ref 4–11)

## 2021-11-10 PROCEDURE — 82043 UR ALBUMIN QUANTITATIVE: CPT

## 2021-11-10 PROCEDURE — 84443 ASSAY THYROID STIM HORMONE: CPT

## 2021-11-10 PROCEDURE — 80053 COMPREHEN METABOLIC PANEL: CPT

## 2021-11-10 PROCEDURE — 87389 HIV-1 AG W/HIV-1&-2 AB AG IA: CPT

## 2021-11-10 PROCEDURE — 36415 COLL VENOUS BLD VENIPUNCTURE: CPT

## 2021-11-10 PROCEDURE — 85027 COMPLETE CBC AUTOMATED: CPT

## 2021-11-10 PROCEDURE — 80061 LIPID PANEL: CPT

## 2021-11-10 PROCEDURE — 86803 HEPATITIS C AB TEST: CPT

## 2021-11-10 PROCEDURE — 83036 HEMOGLOBIN GLYCOSYLATED A1C: CPT

## 2021-11-10 NOTE — RESULT ENCOUNTER NOTE
Martinez Lopes,    Attached are your test results.  --A1C (diabetic test) is normal and indicates that your blood sugar has been in a normal range the last 3 months.   Please contact us if you have any questions.    Migeulina Bledsoe, CNP

## 2021-11-10 NOTE — RESULT ENCOUNTER NOTE
Martinez Lopes,    Attached are your test results.  -Normal red blood cell (hgb) levels, normal white blood cell count and normal platelet levels.   Please contact us if you have any questions.    Miguelina Bledsoe, CNP

## 2021-11-11 NOTE — RESULT ENCOUNTER NOTE
Martinez Lopes,    Attached are your test results.  -LDL(bad) cholesterol level is elevated which can increase your heart disease risk.  A diet high in fat and simple carbohydrates, genetics and being overweight can contribute to this. ADVISE: exercising 150 minutes of aerobic exercise per week (30 minutes for 5 days per week or 50 minutes for 3 days per week are options) and eating a low saturated fat/low carbohydrate diet are helpful to improve this. In 12 months, you should recheck your fasting cholesterol panel by scheduling a lab-only appointment.  -Liver and gallbladder tests are normal (ALT,AST, Alk phos, bilirubin), kidney function is normal (Cr, GFR), sodium is normal, potassium is normal, calcium is normal, glucose is normal.  -TSH (thyroid stimulating hormone) level is normal which indicates normal thyroid function.  -Microalbumin (urine protein) test is normal.  ADVISE: rechecking this annually.  -Hepatitis C antibody screen test shows no signs of a previous hepatitis C infection.  -HIV test is normal.     Please contact us if you have any questions.    Miguelina Bledsoe, CNP

## 2021-11-15 DIAGNOSIS — M62.838 SPASM OF MUSCLE: ICD-10-CM

## 2021-11-15 NOTE — TELEPHONE ENCOUNTER
Routing refill request to provider for review/approval because:  Drug not on the [facility] refill protocol   Drug not active on patient's medication list  Last prescribed 5/10/21  Cande Martin RN

## 2021-11-15 NOTE — TELEPHONE ENCOUNTER
cyclobenzaprine (FLEXERIL) 10 MG tablet (Discontinued) 90 tablet 0 5/10/2021 7/6/2021     Patient requesting refill on a discontinued medication.

## 2021-11-18 RX ORDER — CYCLOBENZAPRINE HCL 10 MG
10 TABLET ORAL 3 TIMES DAILY PRN
Qty: 90 TABLET | Refills: 0 | Status: SHIPPED | OUTPATIENT
Start: 2021-11-18 | End: 2021-12-20

## 2021-11-18 NOTE — TELEPHONE ENCOUNTER
Called and lvm for patient letting her know in person f/u appt is needed for any future refills to be authorized.     Writer unable to close encounter due to unsigned order.

## 2021-12-20 ENCOUNTER — OFFICE VISIT (OUTPATIENT)
Dept: FAMILY MEDICINE | Facility: CLINIC | Age: 46
End: 2021-12-20
Payer: COMMERCIAL

## 2021-12-20 VITALS
BODY MASS INDEX: 35.8 KG/M2 | RESPIRATION RATE: 24 BRPM | HEART RATE: 102 BPM | TEMPERATURE: 98.1 F | OXYGEN SATURATION: 100 % | SYSTOLIC BLOOD PRESSURE: 148 MMHG | WEIGHT: 228.1 LBS | DIASTOLIC BLOOD PRESSURE: 94 MMHG | HEIGHT: 67 IN

## 2021-12-20 DIAGNOSIS — M62.838 SPASM OF MUSCLE: ICD-10-CM

## 2021-12-20 DIAGNOSIS — Z86.718 HISTORY OF DVT (DEEP VEIN THROMBOSIS): ICD-10-CM

## 2021-12-20 DIAGNOSIS — G43.009 MIGRAINE WITHOUT AURA AND WITHOUT STATUS MIGRAINOSUS, NOT INTRACTABLE: ICD-10-CM

## 2021-12-20 DIAGNOSIS — F41.9 ANXIETY: ICD-10-CM

## 2021-12-20 DIAGNOSIS — I10 ESSENTIAL HYPERTENSION: ICD-10-CM

## 2021-12-20 DIAGNOSIS — E66.01 MORBID OBESITY (H): ICD-10-CM

## 2021-12-20 DIAGNOSIS — F41.8 DEPRESSION WITH ANXIETY: Primary | ICD-10-CM

## 2021-12-20 PROCEDURE — 99214 OFFICE O/P EST MOD 30 MIN: CPT | Performed by: NURSE PRACTITIONER

## 2021-12-20 RX ORDER — LOSARTAN POTASSIUM 50 MG/1
75 TABLET ORAL DAILY
Qty: 180 TABLET | Refills: 0 | Status: SHIPPED | OUTPATIENT
Start: 2021-12-20 | End: 2022-03-16

## 2021-12-20 RX ORDER — CYCLOBENZAPRINE HCL 10 MG
10 TABLET ORAL 3 TIMES DAILY PRN
Qty: 90 TABLET | Refills: 3 | Status: SHIPPED | OUTPATIENT
Start: 2021-12-20 | End: 2023-04-11

## 2021-12-20 RX ORDER — VENLAFAXINE HYDROCHLORIDE 75 MG/1
CAPSULE, EXTENDED RELEASE ORAL
Qty: 90 CAPSULE | Refills: 0 | Status: SHIPPED | OUTPATIENT
Start: 2021-12-20 | End: 2022-03-16

## 2021-12-20 RX ORDER — BUSPIRONE HYDROCHLORIDE 5 MG/1
5-10 TABLET ORAL 3 TIMES DAILY PRN
Qty: 90 TABLET | Refills: 1 | Status: SHIPPED | OUTPATIENT
Start: 2021-12-20 | End: 2022-07-07

## 2021-12-20 RX ORDER — SUMATRIPTAN 50 MG/1
50 TABLET, FILM COATED ORAL
Qty: 20 TABLET | Refills: 1 | Status: SHIPPED | OUTPATIENT
Start: 2021-12-20 | End: 2023-04-13

## 2021-12-20 RX ORDER — VENLAFAXINE HYDROCHLORIDE 150 MG/1
CAPSULE, EXTENDED RELEASE ORAL
Qty: 90 CAPSULE | Refills: 0 | Status: SHIPPED | OUTPATIENT
Start: 2021-12-20 | End: 2022-03-16

## 2021-12-20 ASSESSMENT — MIFFLIN-ST. JEOR: SCORE: 1709.34

## 2021-12-20 NOTE — LETTER
12/20/2021    Roxane Lopes  [address] [place] [id number]        To Whom It May Concern,    This letter is to certify that the above patient has the following medical condition that prevents them from receiving the COVID- 19 vaccine:     Other medical condition that prevents this individual from safely receiving a COVID-19 vaccine.       Describe the medical condition and why it prevents this individual from receiving a COVID- 19 vaccine: history of DVT. Potential risk of getting a DVT from the vaccine at this time outweighs getting the vaccine.     If the accommodation request is approved, it is still recommended that the patient continues to mask, social distance, and get COVID-19 testing per her employer requirements to minimize spread of the COVID-19 virus.     If you have any questions or concerns, please let us know.        Sincerely,    MEENAKSHI Ureña, NP-C  [facility]

## 2021-12-20 NOTE — PROGRESS NOTES
Assessment & Plan     Depression with anxiety  Mood is worse due to mother living with her. She is not likely to move out until her home is cleaned up which could be months. Increase venlafaxine to 225 mg daily, follow up with provider, marisa donnelly in 3 months for med check  - venlafaxine (EFFEXOR-XR) 75 MG 24 hr capsule; Take with 150 mg capsule to = 225 mg daily  - venlafaxine (EFFEXOR-XR) 150 MG 24 hr capsule; Take with 75 mg tab to = 225 mg daily    Morbid obesity (H)  Is aware she needs to work on healthy diet/exercise. Lots of other stressors going on, she will work on this.     Essential hypertension  BP high today, stress unlikely to resolve quickly, will increase for now to 75 mg daily. Check BP/HR at home, med check in 3 months  - losartan (COZAAR) 50 MG tablet; Take 1.5 tablets (75 mg) by mouth daily    Spasm of muscle  refill  - cyclobenzaprine (FLEXERIL) 10 MG tablet; Take 1 tablet (10 mg) by mouth 3 times daily as needed for muscle spasms    History of DVT (deep vein thrombosis)  Hx of DVT in left leg. COVID vaccine exception letter written. She will continue to mask, social distance, and get COVID-19 tested per her employers requirements    Migraine without aura and without status migrainosus, not intractable  Having more headaches lately. Using ibuprofen but denies black/bloody stools. Grandmother hx of migraines. Trial imitrex  - SUMAtriptan (IMITREX) 50 MG tablet; Take 1 tablet (50 mg) by mouth at onset of headache for migraine May repeat in 2 hours. Max 4 tablets/24 hours.    Anxiety  Hydroxyzine making her sleepy. Trial buspar. Update provider in 1-2 weeks by Ese if working or not, can make dose adjustments at that time. If after 1-2 weeks after dose adjustments she is still having trouble with anxiety, she would then need a visit to talk about those changes. She verbalized understanding  - busPIRone (BUSPAR) 5 MG tablet; Take 1-2 tablets (5-10 mg) by mouth 3 times daily as needed  "(anxiety)    BMI:   Estimated body mass index is 36.26 kg/m  as calculated from the following:    Height as of this encounter: 1.689 m (5' 6.5\").    Weight as of this encounter: 103.5 kg (228 lb 1.6 oz).   Weight management plan: Discussed healthy diet and exercise guidelines      Return in about 3 months (around 3/20/2022) for Medication check, Phone or Video visit okay.    MEENAKSHI Ureña, NP-C  [facility]   Roxane is a 46 year old who presents for the following health issues  accompanied by her self.    HPI     Mood is down/irritable. About 2 months ago she found her mother, who is an alcoholic, not responding well and unable to move. 911 was called and she had to go to the hospital then rehab to learn how to move/walk and detox. She is now living with Roxane due to her house is a hoarding situation and not safe to live in. Roxane reports she is constantly picking fights or reason to argue and it is causing significant stress. Roxane reports she sometimes starts shaking she gets so upset and angry. She often has to go into her room to get away and decompress.     Needs vaccine exemption before Jan 31st otherwise she will lose her job.  Pharmacy tech at [employer]. She has hx of DVT    Getting more headaches: taking ibuprofen daily lately due to headaches. 600 mg q 6-8 hr sometimes take with food. Tylenol doesn't work well. No blood/black in stool. Grandmother hx of migraines.           Review of Systems   Constitutional, cardiovascular, pulmonary, gi and gu systems are negative, except as otherwise noted.      Objective    BP (!) 148/94   Pulse 102   Temp 98.1  F (36.7  C)   Resp 24   Ht 1.689 m (5' 6.5\")   Wt 103.5 kg (228 lb 1.6 oz)   SpO2 100%   BMI 36.26 kg/m    Body mass index is 36.26 kg/m .  Physical Exam   GENERAL: healthy, alert and no distress  RESP: lungs clear to auscultation - no rales, rhonchi or wheezes  CV: regular rate and rhythm, normal S1 S2, no S3 or S4, no murmur, " click or rub, no peripheral edema  MS: no gross musculoskeletal defects noted, no edema  PSYCH: mentation appears normal, affect normal/bright    Lab on 11/10/2021   Component Date Value Ref Range Status     Hepatitis C Antibody 11/10/2021 Nonreactive  Nonreactive Final     HIV Antigen Antibody Combo 11/10/2021 Nonreactive  Nonreactive Final    HIV-1 p24 Ag & HIV-1/HIV-2 Ab Not Detected     Hemoglobin A1C 11/10/2021 5.0  0.0 - 5.6 % Final    Normal <5.7%   Prediabetes 5.7-6.4%    Diabetes 6.5% or higher     Note: Adopted from ADA consensus guidelines.     Creatinine Urine mg/dL 11/10/2021 131  mg/dL Final     Albumin Urine mg/L 11/10/2021 12  mg/L Final     Albumin Urine mg/g Cr 11/10/2021 9.16  0.00 - 25.00 mg/g Cr Final     TSH 11/10/2021 0.98  0.40 - 4.00 mU/L Final     WBC Count 11/10/2021 6.2  4.0 - 11.0 10e3/uL Final     RBC Count 11/10/2021 4.09  3.80 - 5.20 10e6/uL Final     Hemoglobin 11/10/2021 12.8  11.7 - 15.7 g/dL Final     Hematocrit 11/10/2021 38.6  35.0 - 47.0 % Final     MCV 11/10/2021 94  78 - 100 fL Final     MCH 11/10/2021 31.3  26.5 - 33.0 pg Final     MCHC 11/10/2021 33.2  31.5 - 36.5 g/dL Final     RDW 11/10/2021 13.9  10.0 - 15.0 % Final     Platelet Count 11/10/2021 323  150 - 450 10e3/uL Final     Sodium 11/10/2021 136  133 - 144 mmol/L Final     Potassium 11/10/2021 4.9  3.4 - 5.3 mmol/L Final     Chloride 11/10/2021 106  94 - 109 mmol/L Final     Carbon Dioxide (CO2) 11/10/2021 28  20 - 32 mmol/L Final     Anion Gap 11/10/2021 2* 3 - 14 mmol/L Final     Urea Nitrogen 11/10/2021 10  7 - 30 mg/dL Final     Creatinine 11/10/2021 0.66  0.52 - 1.04 mg/dL Final     Calcium 11/10/2021 9.5  8.5 - 10.1 mg/dL Final     Glucose 11/10/2021 93  70 - 99 mg/dL Final     Alkaline Phosphatase 11/10/2021 77  40 - 150 U/L Final     AST 11/10/2021 15  0 - 45 U/L Final     ALT 11/10/2021 20  0 - 50 U/L Final     Protein Total 11/10/2021 7.6  6.8 - 8.8 g/dL Final     Albumin 11/10/2021 3.9  3.4 - 5.0 g/dL  Final     Bilirubin Total 11/10/2021 0.6  0.2 - 1.3 mg/dL Final     GFR Estimate 11/10/2021 >90  >60 mL/min/1.73m2 Final    As of July 11, 2021, eGFR is calculated by the CKD-EPI creatinine equation, without race adjustment. eGFR can be influenced by muscle mass, exercise, and diet. The reported eGFR is an estimation only and is only applicable if the renal function is stable.     Cholesterol 11/10/2021 219* <200 mg/dL Final     Triglycerides 11/10/2021 96  <150 mg/dL Final     Direct Measure HDL 11/10/2021 56  >=50 mg/dL Final     LDL Cholesterol Calculated 11/10/2021 144* <=100 mg/dL Final     Non HDL Cholesterol 11/10/2021 163* <130 mg/dL Final     Patient Fasting > 8hrs? 11/10/2021 Yes   Final

## 2021-12-20 NOTE — PATIENT INSTRUCTIONS
Send mychart message in 1-2 weeks on how buspar is going    Check BP at home to ensure it is improving with dose adjustments

## 2021-12-28 ENCOUNTER — OFFICE VISIT (OUTPATIENT)
Dept: URGENT CARE | Facility: URGENT CARE | Age: 46
End: 2021-12-28
Payer: COMMERCIAL

## 2021-12-28 VITALS
WEIGHT: 226.8 LBS | TEMPERATURE: 97.8 F | BODY MASS INDEX: 36.06 KG/M2 | SYSTOLIC BLOOD PRESSURE: 119 MMHG | DIASTOLIC BLOOD PRESSURE: 86 MMHG | OXYGEN SATURATION: 98 % | HEART RATE: 76 BPM

## 2021-12-28 DIAGNOSIS — R05.9 COUGH: ICD-10-CM

## 2021-12-28 DIAGNOSIS — M94.0 COSTOCHONDRITIS: ICD-10-CM

## 2021-12-28 DIAGNOSIS — J98.8 VIRAL RESPIRATORY ILLNESS: Primary | ICD-10-CM

## 2021-12-28 DIAGNOSIS — B97.89 VIRAL RESPIRATORY ILLNESS: Primary | ICD-10-CM

## 2021-12-28 DIAGNOSIS — Z20.822 EXPOSURE TO 2019 NOVEL CORONAVIRUS: ICD-10-CM

## 2021-12-28 PROCEDURE — 99214 OFFICE O/P EST MOD 30 MIN: CPT | Performed by: PHYSICIAN ASSISTANT

## 2021-12-28 PROCEDURE — U0005 INFEC AGEN DETEC AMPLI PROBE: HCPCS | Performed by: PHYSICIAN ASSISTANT

## 2021-12-28 PROCEDURE — U0003 INFECTIOUS AGENT DETECTION BY NUCLEIC ACID (DNA OR RNA); SEVERE ACUTE RESPIRATORY SYNDROME CORONAVIRUS 2 (SARS-COV-2) (CORONAVIRUS DISEASE [COVID-19]), AMPLIFIED PROBE TECHNIQUE, MAKING USE OF HIGH THROUGHPUT TECHNOLOGIES AS DESCRIBED BY CMS-2020-01-R: HCPCS | Performed by: PHYSICIAN ASSISTANT

## 2021-12-28 RX ORDER — NAPROXEN 500 MG/1
500 TABLET ORAL 2 TIMES DAILY WITH MEALS
Qty: 20 TABLET | Refills: 1 | Status: SHIPPED | OUTPATIENT
Start: 2021-12-28 | End: 2022-12-12

## 2021-12-28 NOTE — LETTER
[facility]RG[facility]  [address] [facility] [address]  Phone: [phone]    December 28, 2021        Roxane Lopes  [address] [place] [phone]          To whom it may concern:    RE: Roxane Leonsandra    Patient was seen and treated today at our clinic and missed work.    Please contact me for questions or concerns.      Sincerely,        Nanette Card PA-C

## 2021-12-28 NOTE — PROGRESS NOTES
Chief Complaint   Patient presents with     URI     cough, chills, fever on and off. Saturday      Covid 19 Testing     Pt's sister and nephew are positive for covid.                ASSESSMENT:     ICD-10-CM    1. Viral respiratory illness  J98.8 naproxen (NAPROSYN) 500 MG tablet    B97.89    2. Costochondritis  M94.0 naproxen (NAPROSYN) 500 MG tablet   3. Cough  R05.9 Symptomatic; Yes COVID-19 Virus (Coronavirus) by PCR Nose     naproxen (NAPROSYN) 500 MG tablet   4. Exposure to 2019 novel coronavirus  Z20.822            PLAN: Suspect Covid.  Also with costochondritis.  Naprosyn prescribed.  I have discussed clinical findings with patient.  Side effects of medications discussed.  Symptomatic care is discussed.  I have discussed the possibility of  worsening symptoms and indication to RTC or go to the ER if they occur.  All questions are answered, patient indicates understanding of these issues and is in agreement with plan.   Patient care instructions are discussed/given at the end of visit.   Lots of rest and fluids.      Nanette Card PA-C      SUBJECTIVE:  46-year-old female with cough, chills, intermittent fever for the past 3 days.  Sister and nephew are positive for Covid.  No vomiting or diarrhea.  No Covid vaccination.  No asthma or allergies.  No decreased taste or smell.  Left-sided chest tenderness when she pushes on it and also when she takes a deep breath.  No rash.      Allergies   Allergen Reactions     Chantix [Varenicline Tartrate] Anxiety       Past Medical History:   Diagnosis Date     Benign essential hypertension      Depression      DVT (deep venous thrombosis) (H) 04/2011    left leg, was on lovenox and coumadin but have been off since November 2011     Essential hypertension 12/20/2021     H/O tubal ligation     2006      Obesity      Prediabetes        busPIRone (BUSPAR) 5 MG tablet, Take 1-2 tablets (5-10 mg) by mouth 3 times daily as needed (anxiety)  cyclobenzaprine (FLEXERIL) 10  MG tablet, Take 1 tablet (10 mg) by mouth 3 times daily as needed for muscle spasms  ibuprofen (ADVIL/MOTRIN) 800 MG tablet, Take 800 mg by mouth every 8 hours as needed for moderate pain   losartan (COZAAR) 50 MG tablet, Take 1.5 tablets (75 mg) by mouth daily  order for DME, rollabout  3 month duration  order for DME, Medium short Aircast boot  SUMAtriptan (IMITREX) 50 MG tablet, Take 1 tablet (50 mg) by mouth at onset of headache for migraine May repeat in 2 hours. Max 4 tablets/24 hours.  venlafaxine (EFFEXOR-XR) 150 MG 24 hr capsule, Take with 75 mg tab to = 225 mg daily  venlafaxine (EFFEXOR-XR) 75 MG 24 hr capsule, Take with 150 mg capsule to = 225 mg daily    No current facility-administered medications on file prior to visit.      Social History     Tobacco Use     Smoking status: Former Smoker     Packs/day: 0.50     Years: 10.00     Pack years: 5.00     Types: Cigarettes, Other     Quit date: 10/16/2012     Years since quittin.2     Smokeless tobacco: Never Used     Tobacco comment: Vaping now    Substance Use Topics     Alcohol use: Yes     Comment: Maybe once a year       ROS:  CONSTITUTIONAL: Negative for fatigue or fever.  EYES: Negative for eye problems.  ENT: As above.  RESP: As above.  CV: Negative for chest pains.  GI: Negative for vomiting.  MUSCULOSKELETAL:  As above.  NEUROLOGIC: Negative for headaches.  SKIN: Negative for rash.  PSYCH: Normal mentation for age.    OBJECTIVE:  /86 (BP Location: Right arm, Patient Position: Sitting, Cuff Size: Adult Large)   Pulse 76   Temp 97.8  F (36.6  C) (Tympanic)   Wt 102.9 kg (226 lb 12.8 oz)   SpO2 98%   BMI 36.06 kg/m    GENERAL APPEARANCE: Healthy, alert and no distress.  EYES:Conjunctiva/sclera clear.  EARS: No cerumen.   Ear canals w/o erythema.  TM's intact w/o erythema.    NOSE/MOUTH: Nose without ulcers, erythema or lesions.  SINUSES: No maxillary sinus tenderness.  THROAT: No erythema w/o tonsillar enlargement . No exudates.  NECK:  Supple, nontender, no lymphadenopathy.  RESP: Lungs clear to auscultation - no rales, rhonchi or wheezes  CV: Regular rate and rhythm, normal S1 S2, no murmur noted.  NEURO: Awake, alert    SKIN: No rashes  Abdomen-soft nontender  Chest wall-localized tenderness lower left sternal border.  Perfectly reproduces her chest pain.      Nanette Card PA-C

## 2021-12-28 NOTE — PATIENT INSTRUCTIONS
Patient Education     Costochondritis  Costochondritis is inflammation of a rib or the cartilage that connects a rib to your breastbone (sternum). It causes soreness, and may cause chest pain that can be sharp or aching or feel like pressure. The pain may get worse with deep breathing, movement, or exercise. In some cases, the pain is mistaken for a heart attack. But the condition is not serious. Read on to learn more about the condition and how it can be treated.     What causes costochondritis?  The cause is not fully known. It may happen after a chest injury, chest infection, or bout of coughing. Some physical activities may lead to costochondritis. Large-breasted women may be more likely to have the condition. Often, the cause is unknown.   Diagnosing costochondritis  There is no test for costochondritis. The condition is diagnosed by the symptoms you have. Your healthcare provider will give you a physical exam. He or she will ask you about your symptoms and examine your chest for pain. In some cases, tests are done to rule out more serious problems. These tests may include tests such as chest X-ray, CT scan, or an ECG.   Treating costochondritis  If a cause is found, treatment for that will likely relieve the problem. Costochondritis often goes away on its own. The course of the condition varies from person to person. It usually lasts from weeks to months. In some cases, mild symptoms continue for months to years. To ease symptoms:     Take medicine as directed. These relieve pain and swelling. Ibuprofen or other NSAIDs are often advised. In some cases, you may be given prescription medicine, such as muscle relaxants.    Don't do activities that put stress on your chest or spine.    Apply a heating pad (set to warm, not high heat) to your breastbone several times a day.    Do stretching exercises as directed.  When to call the healthcare provider  Call the healthcare provider right away if you have any of  these:    Pain that is not relieved by medicine    Shortness of breath    Lightheadedness, dizziness, or fainting    Feeling of irregular heartbeat or fast pulse  Anyone with chest pain should see a healthcare provider, especially older adults and people at risk for heart disease.   Lauri last reviewed this educational content on 2/1/2020 2000-2021 [employer]. All rights reserved. This information is not intended as a substitute for professional medical care. Always follow your healthcare professional's instructions.

## 2021-12-29 LAB — SARS-COV-2 RNA RESP QL NAA+PROBE: POSITIVE

## 2022-01-07 ENCOUNTER — MYC MEDICAL ADVICE (OUTPATIENT)
Dept: FAMILY MEDICINE | Facility: CLINIC | Age: 47
End: 2022-01-07
Payer: COMMERCIAL

## 2022-01-07 DIAGNOSIS — R11.0 NAUSEA: Primary | ICD-10-CM

## 2022-01-07 DIAGNOSIS — R11.10 VOMITING: ICD-10-CM

## 2022-01-07 RX ORDER — ONDANSETRON 4 MG/1
4-8 TABLET, ORALLY DISINTEGRATING ORAL EVERY 8 HOURS PRN
Qty: 20 TABLET | Refills: 0 | Status: SHIPPED | OUTPATIENT
Start: 2022-01-07 | End: 2023-04-13

## 2022-01-07 NOTE — TELEPHONE ENCOUNTER
Patient has been on this in the past. Would you like to send this in for the patient?    Kiersten Leyva RN [facility]

## 2022-01-16 NOTE — TELEPHONE ENCOUNTER
"Dr. Hernandez.     \"Drug not on the [facility] refill protocol\" simply means that the RN's are not authorized to refill this medication. It means that the doctor needs to refill the medication.     Please send refill for the Flexeril as requested by the patient.     Deloris Whitman RN    " 1938 Verbal shift change  Received from [place], RN (outgoing nurse), to JEREMY Paredes (oncoming)  Pt. Is AOX 2. IV patent and infusing well, Pt. denies  pain at this time. Report included the following information SBAR, Kardex, Procedure Summary, Intake/Output, MAR, Recent Lab Results, and  Cardiac Rhythm @ sr. Will resume care and monitor Pt. Condition. Pt. Educated on call bell when in need of help and assistance. Pt. Percell Dries to comprehend education and verbalized understanding. Pt. Head to toe Assessment Done and documented. 2100  Pt. Resting in bed comfortably. 2230  Pt. Made no complaints. 0000  Pt. Resting in bed, not in distress, eyes closed. 0200  Pt denies pain. 0400  Pt. Educated on 'honey thick liquid\"  Pt. Refused to drink fluids. 0600 Pt. Able to rest and sleep well throughout the shift. Verbal and bedside Shift changed report given to TAWANDA Irby (oncoming RN) on Pt. Condition. Report consisted of patients Situation, History, Activities, intake/output,  Background, Assessment and Recommendations(SBAR). Information from the following report(s) Kardex, order Summary, Lab results and MAR was reviewed with the receiving nurse. Opportunity for questions and clarification was provided.

## 2022-01-21 DIAGNOSIS — F41.9 ANXIETY: ICD-10-CM

## 2022-01-21 RX ORDER — BUSPIRONE HYDROCHLORIDE 5 MG/1
5-10 TABLET ORAL 3 TIMES DAILY PRN
Qty: 540 TABLET | Refills: 1 | OUTPATIENT
Start: 2022-01-21

## 2022-01-21 NOTE — TELEPHONE ENCOUNTER
Duplicate order prescription filled on 1/20/2021 with a 90 day supply and 1 refills.   Kirsty Kenyon RN, BSN   [facility]

## 2022-03-14 DIAGNOSIS — I10 ESSENTIAL HYPERTENSION: ICD-10-CM

## 2022-03-14 DIAGNOSIS — F41.8 DEPRESSION WITH ANXIETY: ICD-10-CM

## 2022-03-16 RX ORDER — VENLAFAXINE HYDROCHLORIDE 75 MG/1
CAPSULE, EXTENDED RELEASE ORAL
Qty: 90 CAPSULE | Refills: 0 | Status: SHIPPED | OUTPATIENT
Start: 2022-03-16 | End: 2022-06-13

## 2022-03-16 RX ORDER — LOSARTAN POTASSIUM 50 MG/1
75 TABLET ORAL DAILY
Qty: 180 TABLET | Refills: 0 | Status: SHIPPED | OUTPATIENT
Start: 2022-03-16 | End: 2022-12-08

## 2022-03-16 RX ORDER — VENLAFAXINE HYDROCHLORIDE 150 MG/1
CAPSULE, EXTENDED RELEASE ORAL
Qty: 90 CAPSULE | Refills: 0 | Status: SHIPPED | OUTPATIENT
Start: 2022-03-16 | End: 2022-06-13

## 2022-03-16 NOTE — TELEPHONE ENCOUNTER
"Routing refill request to provider for review/approval because:  PHQ 9    Requested Prescriptions   Pending Prescriptions Disp Refills    venlafaxine (EFFEXOR-XR) 150 MG 24 hr capsule 90 capsule 0     Sig: Take with 75 mg tab to = 225 mg daily        Serotonin-Norepinephrine Reuptake Inhibitors  Failed - 3/14/2022  8:55 AM        Failed - PHQ-9 score of less than 5 in past 6 months     Please review last PHQ-9 score.           Passed - Blood pressure under 140/90 in past 12 months       BP Readings from Last 3 Encounters:   12/28/21 119/86   12/20/21 (!) 148/94   07/21/21 (!) 140/90                 Passed - Medication is active on med list        Passed - Patient is age 18 or older        Passed - No active pregnancy on record        Passed - Normal serum creatinine on file in past 12 months     Recent Labs   Lab Test 11/10/21  1049   CR 0.66       Ok to refill medication if creatinine is low          Passed - No positive pregnancy test in past 12 months        Passed - Recent (6 mo) or future (30 days) visit within the authorizing provider's specialty     Patient had office visit in the last 6 months or has a visit in the next 30 days with authorizing provider or within the authorizing provider's specialty.  See \"Patient Info\" tab in inbasket, or \"Choose Columns\" in Meds & Orders section of the refill encounter.               venlafaxine (EFFEXOR-XR) 75 MG 24 hr capsule 90 capsule 0     Sig: Take with 150 mg capsule to = 225 mg daily        Serotonin-Norepinephrine Reuptake Inhibitors  Failed - 3/14/2022  8:55 AM        Failed - PHQ-9 score of less than 5 in past 6 months     Please review last PHQ-9 score.           Passed - Blood pressure under 140/90 in past 12 months       BP Readings from Last 3 Encounters:   12/28/21 119/86   12/20/21 (!) 148/94   07/21/21 (!) 140/90                 Passed - Medication is active on med list        Passed - Patient is age 18 or older        Passed - No active pregnancy on " "record        Passed - Normal serum creatinine on file in past 12 months     Recent Labs   Lab Test 11/10/21  1049   CR 0.66       Ok to refill medication if creatinine is low          Passed - No positive pregnancy test in past 12 months        Passed - Recent (6 mo) or future (30 days) visit within the authorizing provider's specialty     Patient had office visit in the last 6 months or has a visit in the next 30 days with authorizing provider or within the authorizing provider's specialty.  See \"Patient Info\" tab in inbasket, or \"Choose Columns\" in Meds & Orders section of the refill encounter.              Signed Prescriptions Disp Refills    losartan (COZAAR) 50 MG tablet 180 tablet 0     Sig: Take 1.5 tablets (75 mg) by mouth daily        Angiotensin-II Receptors Passed - 3/14/2022  8:55 AM        Passed - Last blood pressure under 140/90 in past 12 months       BP Readings from Last 3 Encounters:   12/28/21 119/86   12/20/21 (!) 148/94   07/21/21 (!) 140/90                 Passed - Recent (12 mo) or future (30 days) visit within the authorizing provider's specialty     Patient has had an office visit with the authorizing provider or a provider within the authorizing providers department within the previous 12 mos or has a future within next 30 days. See \"Patient Info\" tab in inbasket, or \"Choose Columns\" in Meds & Orders section of the refill encounter.              Passed - Medication is active on med list        Passed - Patient is age 18 or older        Passed - No active pregnancy on record        Passed - Normal serum creatinine on file in past 12 months     Recent Labs   Lab Test 11/10/21  1049   CR 0.66       Ok to refill medication if creatinine is low          Passed - Normal serum potassium on file in past 12 months       Recent Labs   Lab Test 11/10/21  1049   POTASSIUM 4.9                    Passed - No positive pregnancy test in past 12 months            Faith Garcia RN, BSN  [facility] " [facility]

## 2022-04-08 ENCOUNTER — OFFICE VISIT (OUTPATIENT)
Dept: FAMILY MEDICINE | Facility: CLINIC | Age: 47
End: 2022-04-08
Payer: COMMERCIAL

## 2022-04-08 VITALS
WEIGHT: 220 LBS | BODY MASS INDEX: 34.53 KG/M2 | DIASTOLIC BLOOD PRESSURE: 86 MMHG | OXYGEN SATURATION: 98 % | TEMPERATURE: 98.3 F | RESPIRATION RATE: 16 BRPM | SYSTOLIC BLOOD PRESSURE: 126 MMHG | HEART RATE: 109 BPM | HEIGHT: 67 IN

## 2022-04-08 DIAGNOSIS — I83.893 SYMPTOMATIC VARICOSE VEINS OF BOTH LOWER EXTREMITIES: ICD-10-CM

## 2022-04-08 DIAGNOSIS — E66.01 MORBID OBESITY (H): ICD-10-CM

## 2022-04-08 DIAGNOSIS — M54.41 ACUTE RIGHT-SIDED LOW BACK PAIN WITH BILATERAL SCIATICA: Primary | ICD-10-CM

## 2022-04-08 DIAGNOSIS — M25.511 ACUTE PAIN OF RIGHT SHOULDER: ICD-10-CM

## 2022-04-08 DIAGNOSIS — M54.42 ACUTE RIGHT-SIDED LOW BACK PAIN WITH BILATERAL SCIATICA: Primary | ICD-10-CM

## 2022-04-08 PROBLEM — M46.28: Status: RESOLVED | Noted: 2021-04-27 | Resolved: 2022-04-08

## 2022-04-08 PROCEDURE — 90715 TDAP VACCINE 7 YRS/> IM: CPT | Performed by: NURSE PRACTITIONER

## 2022-04-08 PROCEDURE — 99214 OFFICE O/P EST MOD 30 MIN: CPT | Mod: 25 | Performed by: NURSE PRACTITIONER

## 2022-04-08 PROCEDURE — 90471 IMMUNIZATION ADMIN: CPT | Performed by: NURSE PRACTITIONER

## 2022-04-08 ASSESSMENT — PATIENT HEALTH QUESTIONNAIRE - PHQ9: SUM OF ALL RESPONSES TO PHQ QUESTIONS 1-9: 11

## 2022-04-08 ASSESSMENT — PAIN SCALES - GENERAL: PAINLEVEL: MILD PAIN (2)

## 2022-04-08 NOTE — PATIENT INSTRUCTIONS
Patient Education     Shoulder Pain with Uncertain Cause   Shoulder pain can have many causes. Pain often comes from the structures that surround the shoulder joint. These are the joint capsule, ligaments, tendons, muscles, and bursa. Pain can also come from cartilage in the joint. Cartilage can become worn out or injured. It s important to know what s causing your pain so the healthcare provider can use the correct treatment. But sometimes it s difficult to find the exact cause of shoulder pain. You may need to see a specialist (orthopedist). You may also need special tests such as a CT scan or MRI. The provider may need to use special tools to look inside the joint (arthroscopy).  Shoulder pain can be treated with a sling or a device that keeps your shoulder from moving. You can take an anti-inflammatory medicine such as ibuprofen to ease pain. You may need to do special shoulder exercises. Follow up with a specialist if the pain is severe or doesn t go away after a few weeks.  Home care  Follow these tips when caring for yourself at home:    If a sling was given to you, leave it in place for the time advised by your healthcare provider. If you aren t sure how long to wear it, ask for advice. If the sling becomes loose, adjust it so that your forearm is level with the ground. Your shoulder should feel well supported.    Put an ice pack on the injured area for 20 minutes every 1 to 2 hours the first day. You can make your own ice pack by putting ice cubes in a plastic bag. Wrap the bag in a thin towel. Continue with ice packs 3 to 4 times a day for the next 2 days. Then use the pack as needed to ease pain and swelling.    You may use acetaminophen or ibuprofen to control pain, unless another pain medicine was prescribed. If you have chronic liver or kidney disease, talk with your healthcare provider before using these medicines. Also talk with your provider if you ve ever had a stomach ulcer or digestive  "bleeding.    Shoulder pain may seem worse at night, when there is less to distract you from the pain. If you sleep on your side, try to keep weight off your painful shoulder. Propping pillows behind you may stop you from rolling over onto that shoulder during sleep.     Shoulder and elbow joints can become stiff if left in a sling for too long. You should start range of motion exercises about 7 to 10 days after the injury. Talk with your provider to find out what type of exercises to do and how soon to start.    You can take the sling off to shower or bathe.  Follow-up care  Follow up with your healthcare provider if you don t start to get better in the next 5 days.  When to seek medical advice  Call your healthcare provider right away if any of these occur:    Pain or swelling gets worse or continues for more than a few days    Your hand or fingers become cold, blue, numb, or tingly    Large amount of bruising on your shoulder or upper arm    Trouble moving your hand or fingers    Weakness in your hand or fingers    Your shoulder becomes stiff    It feels like your shoulder is popping out    You are less able to do your daily activities  Lauri last reviewed this educational content on 1/1/2020 2000-2021 [employer]. All rights reserved. This information is not intended as a substitute for professional medical care. Always follow your healthcare professional's instructions.           Patient Education     * Sciatica     Sciatica (\"Lumbar Radiculopathy\") causes a pain that spreads from the lower back down into the buttock, hip and leg. Sometimes leg pain can occur without any back pain. Sciatica is due to irritation or pressure on a spinal nerve as it comes out of the spinal canal. This is most often due to pressure on the nerve from a nearby spinal disk (the cartilage cushion between each spinal bone). Other causes include spinal stenosis (narrowing of the spinal canal) and spasm of the " piriformis muscle (a muscle in the buttocks that the sciatic nerve passes through).  Sciatica may begin after a sudden twisting/bending force (such as in a car accident), or sometimes after a simple awkward movement. In either case, muscle spasm is commonly present and can add to the pain.  The diagnosis of sciatica is made from the symptoms and physical exam. Unless you had a physical injury (such as a car accident or fall), X-rays are usually not ordered for the initial evaluation of sciatica because the nerves and disks cannot be seen on an X-ray. Most sciatica (80-90%) gets better with time.  What can I do about my low back pain?  There are three main things you can do to ease low back pain and help it go away.    Stay active! Use positions, movements and exercises. Too much rest can make your symptoms worse.    Use heat or cold packs.    Take medicine as directed.  Using positions, movements and exercises  Research tells us that moving your joints and muscles can help you recover from back pain. Such activity should be simple and gentle.  Use walking to help relieve your discomfort. Try taking a short walk every 3 to 4 hours during the day. Walk for a few minutes inside your home or take longer walks outside, on a treadmill or at a mall. Slowly increase the amount of time you walk. Expect discomfort when you begin, but it should lessen as your back starts to recover.  Finding a position that is comfortable  When your back pain is new, you may find that certain positions will ease your pain. Gently try each of the following positions until you find one that eases your pain. Once you find a position of comfort, use it as often as you like while you recover. Return to your daily routine as soon as possible.    Lie on your back with your legs bent. You can do this by placing a pillow under your knees or lie on the floor and rest your lower legs on the seat of a chair.    Lie on your side with your knees bent and  place a pillow between your knees.    Lie on your stomach over pillows.  Using heat or cold packs  Try cold packs or gentle heat to ease your pain. Use whichever gives you the most relief. Apply the cold pack or heat for 15 minutes at a time, as often as needed.  Taking medicine  If taking over-the-counter medicine:    Take ibuprofen (Advil, Motrin) 600 mg. three times a day as needed for pain.  OR    Take Aleve (naproxen sodium) 220 to 440 mg. two times a day as needed for pain.  If your doctor prescribed medicine, follow the instructions. Stop taking the medicine as soon as you can.  When should I call my doctor?  Your back pain should improve over the first couple of weeks. As it improves, you should be able to return to your normal activities. But call your doctor if:    You have a sudden change in your ability to control?your bladder or bowels.    You begin to feel tingling in your groin or legs.    The pain spreads down your leg and into your foot.    Your toes, feet or leg muscles begin to feel weak.    You feel generally unwell or sick.    Your pain gets worse.  For informational purposes only. Not to replace the advice of your health care provider.  Copyright   2018 [employer]. All rights reserved.

## 2022-04-08 NOTE — PROGRESS NOTES
Assessment & Plan     Acute right-sided low back pain with bilateral sciatica  Patient with significant bilateral sciatica and positive right straight leg raise. She did have an abscess on her coccyx last year that was debrided and she endorses intermittent tailbone/buttock pain so will get an MRI to r/o infection. Counseled on self-care measures including: ice/heat, OTC pain medications, frequent short walks, comfortable positions; and warning signs of when to seek urgent medical care including: sudden change in bowel or bladder, fever, new numbness/tingling/weakness, worsening symptoms.  - Physical Therapy Referral; Future  - MR Lumbar Spine w/o Contrast; Future    Acute pain of right shoulder  Counseled on self-care measures including: ice/heat, OTC pain medications; and warning signs of when to seek urgent medical care including: new numbness/tingling/weakness, worsening symptoms.  - Physical Therapy Referral; Future  - Orthopedic  Referral; Future    Symptomatic varicose veins of both lower extremities  Patient with symptomatic pain, intermittent swelling, chronic lower leg skin changes. She does not tolerate compression stockings.   - Vascular Surgery Referral; Future    Morbid obesity (H)  BMI 34.98 with HTN. Continue to work on weight loss.     31 minutes spent on the date of the encounter doing chart review, history and exam, documentation and further activities per the note     See Patient Instructions    Return in about 6 weeks (around 5/20/2022).    MEENAKSHI Stroud CNP  [facility] ANTON Betts is a 46 year old who presents for the following health issues     Musculoskeletal Problem    History of Present Illness       Reason for visit:  Shoulder pain, legs burning  Symptom onset:  1-2 weeks ago  Symptom intensity:  Moderate  Symptom progression:  Worsening  Had these symptoms before:  No  What makes it worse:  Keep walking or moving my shoulder alot    She  "eats 0-1 servings of fruits and vegetables daily.She consumes 3 sweetened beverage(s) daily.She exercises with enough effort to increase her heart rate 9 or less minutes per day.  She exercises with enough effort to increase her heart rate 3 or less days per week.   She is taking medications regularly.     Pain History:  When did you first notice your pain? - 1 to 6 weeks   Have you seen any provider previously for this issue? No  How has your pain affected your ability to work? Pain does not limit ability to work   Where in your body do you have pain? Musculoskeletal problem/pain  Onset/Duration: 2 weeks ago  Description  Location: shoulder - right  Joint Swelling: no  Redness: no  Pain: YES  Warmth: no  Intensity:  moderate  Progression of Symptoms:  waxing and waning  Accompanying signs and symptoms:   Fevers: no  Numbness/tingling/weakness: no  History  Trauma to the area: no  Recent illness:  no  Previous similar problem: no  Previous evaluation:  no  Precipitating or alleviating factors:  Aggravating factors include: overuse  Therapies tried and outcome: nothing    Patient has notice pain and burning sensation radiating from her buttock to posterior knees that past 2 weeks. Worse when she is up standing and walking. Pain in the right lower back with certain movements but no back pain in between. Denies localized pain, warm, redness in the upper legs. Denies change in bowel or bladder, saddle anesthesia. She did have an abscess on her coccyx last year that was debrided and notes that she will still get occasional pains in her tailbone but nothing as bad as last year. Denies fever, chills, vomiting, diarrhea, drainage.     Review of Systems   Constitutional, HEENT, cardiovascular, pulmonary, gi and gu systems are negative, except as otherwise noted.      Objective    /86   Pulse 109   Temp 98.3  F (36.8  C) (Oral)   Resp 16   Ht 1.689 m (5' 6.5\")   Wt 99.8 kg (220 lb)   LMP 03/23/2022 (Approximate)  "  SpO2 98%   BMI 34.98 kg/m    Body mass index is 34.98 kg/m .  Physical Exam   GENERAL: healthy, alert and no distress  EYES: Eyes grossly normal to inspection, PERRL and conjunctivae and sclerae normal  RESP: lungs clear to auscultation - no rales, rhonchi or wheezes  CV: regular rate and rhythm, normal S1 S2, no S3 or S4, no murmur, click or rub, no peripheral edema and peripheral pulses strong  MS: decreased range of motion right shoulder, negative empty can test, no edema and peripheral pulses normal  SKIN: no suspicious lesions or rashes  Comprehensive back pain exam:  No tenderness, Pain limits the following motions: bend, Unable to complete strength testing due to pain and burning in upper legs, Lower extremity reflexes within normal limits bilaterally, Lower extremity sensation normal and equal on both sides and Straight leg positive on  right, indicating possible ipsilateral radiculopathy  PSYCH: mentation appears normal, affect normal/bright

## 2022-04-13 NOTE — PROGRESS NOTES
SUBJECTIVE:  Roxane Lopes is a 46 year old female who is seen in consultation at the request of Lucina Sagastume for right shoulder pain.  she has had issues with this 3 weeks, and has been worse the past few days.    Present symptoms:   Pain trying to move it in abduction   Not much pain to lift  No weakness.  Pain posteriorly, and anteriorly and axilla.  Feels some crackling in the shoulder with movements.  PAIN to lay supine, flat    Associated symptoms: neck pain, radiating pain to chest wall, numbness/tingling down to wrist, a tiny bit in fingers    Treatment up to this point: nsaids, ice, heat,  physical therapy ordered recently.    Past Medical History:   Past Medical History:   Diagnosis Date     Benign essential hypertension      Depression      DVT (deep venous thrombosis) (H) 04/2011    left leg, was on lovenox and coumadin but have been off since November 2011     Essential hypertension 12/20/2021     H/O tubal ligation     2006      Obesity      Prediabetes      Past Surgical History:   Past Surgical History:   Procedure Laterality Date     EXAM UNDER ANESTHESIA ANUS N/A 4/30/2021    Procedure: EXAM UNDER ANESTHESIA;  Surgeon: Leandro Charles MD;  Location: [facility] OR     EXCISE LESION RECTUM N/A 4/30/2021    Procedure: Excision of coccygeal cyst;  Surgeon: Leandro Charles MD;  Location: [facility] OR     LAPAROSCOPIC CHOLECYSTECTOMY  10/9/2012    Procedure: LAPAROSCOPIC CHOLECYSTECTOMY;  Laparoscopic Cholecystectomy;  Surgeon: Martell Briscoe MD;  Location: [facility] OR     LAPAROSCOPIC TUBAL LIGATION       PROCTOSCOPY N/A 4/30/2021    Procedure: Proctoscopy;  Surgeon: Leandro Charles MD;  Location: [facility] OR     Family History:   Family History   Problem Relation Age of Onset     Diabetes Maternal Grandfather      Hypertension Maternal Grandfather      Hyperlipidemia Maternal Grandfather      Deep Vein Thrombosis Maternal Grandmother 32     Depression Mother      Unknown/Adopted Father      Diabetes Other       "Unknown/Adopted Paternal Grandmother      C.A.D. No family hx of      Cerebrovascular Disease No family hx of      Anesthesia Reaction No family hx of      Arthritis No family hx of      Asthma No family hx of      Breast Cancer No family hx of      Cancer - colorectal No family hx of      Prostate Cancer No family hx of      Thyroid Disease No family hx of      Lipids No family hx of      Congenital Anomalies No family hx of      Social History:   Social History     Tobacco Use     Smoking status: Former Smoker     Packs/day: 0.50     Years: 10.00     Pack years: 5.00     Types: Cigarettes, Other     Quit date: 10/16/2012     Years since quittin.4     Smokeless tobacco: Never Used     Tobacco comment: Vaping now    Substance Use Topics     Alcohol use: Yes     Comment: Maybe once a year       Review of Systems:  Constitutional:  NEGATIVE for fever, chills, change in weight  Integumentary/Skin:  NEGATIVE for worrisome rashes, moles or lesions  Eyes:  NEGATIVE for vision changes or irritation  ENT/Mouth:  NEGATIVE for ear, mouth and throat problems  Resp:  NEGATIVE for significant cough or SOB  Breast:  NEGATIVE for masses, tenderness or discharge  CV:  NEGATIVE for chest pain, palpitations or peripheral edema  GI:  NEGATIVE for nausea, abdominal pain, heartburn, or change in bowel habits  :  Negative   Musculoskeletal:  See HPI above  Neuro:  NEGATIVE for weakness, dizziness or paresthesias  Endocrine:  NEGATIVE for temperature intolerance, skin/hair changes  Heme/allergy/immune:  NEGATIVE for bleeding problems  Psychiatric:  NEGATIVE for changes in mood or affect    OBJECTIVE:  Physical Exam:  /87 (BP Location: Left arm, Patient Position: Sitting, Cuff Size: Adult Regular)   Pulse 103   Ht 1.689 m (5' 6.5\")   Wt 99.8 kg (220 lb)   LMP 2022 (Approximate)   SpO2 98%   BMI 34.98 kg/m    General Appearance: healthy, alert and in no distress   Skin: no suspicious lesions or rashes  Neuro: Normal " strength and tone, mentation intact and speech normal  Vascular: good pulses, and cappillary refill   Lymph: no lymphadenopathy   Psych:  mentation appears normal and affect normal/bright  Resp: no increased work of breathing    Neck Exam:  Cervical range of motion: full causes neck pain and does cause some shoulder pain.  Sensory deficits: none  Motor deficits: none    Right Shoulder Exam:  Shoulder Inspection: no swelling, bruising, discoloration, or obvious deformity or asymmetry  no atrophy  Tender: diffuse, including shoulder girdle    Range of Motion:   Active: forward flexion: full, internal rotation: full   Passive: forward flexion: full, external rotation: full  Strength: forward flexion: 5/5, external rotation: 5/5, Liftoff: Able  Impingement: all grade 0  Special tests: Speed: Negative  Anterior Drawer: 0  Posterior Drawer: 0  Spurling's: Positive  Adsons: equivocal    X-rays:  Obtained today, right shoulder are reviewed in the office with the patient: normal. Type 2 acromion    MRI:   none      ASSESSMENT:  Encounter Diagnosis   Name Primary?     Right shoulder pain, unspecified chronicity Yes      Cervical mediated pain most likely    PLAN:  physical therapy ordered for neck, shoulder  neurontin prescription written  I think would be better followed by sports med or spine.    Return to clinic:6 weeks or as needed     MASHA Hickey MD  Dept. Orthopedic Surgery  [facility]

## 2022-04-14 ENCOUNTER — ANCILLARY PROCEDURE (OUTPATIENT)
Dept: GENERAL RADIOLOGY | Facility: CLINIC | Age: 47
End: 2022-04-14
Attending: ORTHOPAEDIC SURGERY
Payer: COMMERCIAL

## 2022-04-14 ENCOUNTER — OFFICE VISIT (OUTPATIENT)
Dept: ORTHOPEDICS | Facility: CLINIC | Age: 47
End: 2022-04-14
Attending: NURSE PRACTITIONER

## 2022-04-14 VITALS
OXYGEN SATURATION: 98 % | HEIGHT: 67 IN | BODY MASS INDEX: 34.53 KG/M2 | DIASTOLIC BLOOD PRESSURE: 87 MMHG | HEART RATE: 103 BPM | SYSTOLIC BLOOD PRESSURE: 122 MMHG | WEIGHT: 220 LBS

## 2022-04-14 DIAGNOSIS — M25.511 ACUTE PAIN OF RIGHT SHOULDER: ICD-10-CM

## 2022-04-14 DIAGNOSIS — M25.511 RIGHT SHOULDER PAIN, UNSPECIFIED CHRONICITY: Primary | ICD-10-CM

## 2022-04-14 PROCEDURE — 99244 OFF/OP CNSLTJ NEW/EST MOD 40: CPT | Performed by: ORTHOPAEDIC SURGERY

## 2022-04-14 PROCEDURE — 73030 X-RAY EXAM OF SHOULDER: CPT | Mod: RT | Performed by: RADIOLOGY

## 2022-04-14 RX ORDER — GABAPENTIN 100 MG/1
100 CAPSULE ORAL 3 TIMES DAILY
Qty: 30 CAPSULE | Refills: 3 | Status: SHIPPED | OUTPATIENT
Start: 2022-04-14 | End: 2022-06-26

## 2022-04-14 NOTE — LETTER
4/14/2022         RE: Roxane Lopes  [address] [place] [id number]        Dear Colleague,    Thank you for referring your patient, Roxane Lopes, to [facility]. Please see a copy of my visit note below.    SUBJECTIVE:  Roxane Lopes is a 46 year old female who is seen in consultation at the request of Lucina Sagastume for right shoulder pain.  she has had issues with this 3 weeks, and has been worse the past few days.    Present symptoms:   Pain trying to move it in abduction   Not much pain to lift  No weakness.  Pain posteriorly, and anteriorly and axilla.  Feels some crackling in the shoulder with movements.  PAIN to lay supine, flat    Associated symptoms: neck pain, radiating pain to chest wall, numbness/tingling down to wrist, a tiny bit in fingers    Treatment up to this point: nsaids, ice, heat,  physical therapy ordered recently.    Past Medical History:   Past Medical History:   Diagnosis Date     Benign essential hypertension      Depression      DVT (deep venous thrombosis) (H) 04/2011    left leg, was on lovenox and coumadin but have been off since November 2011     Essential hypertension 12/20/2021     H/O tubal ligation     2006      Obesity      Prediabetes      Past Surgical History:   Past Surgical History:   Procedure Laterality Date     EXAM UNDER ANESTHESIA ANUS N/A 4/30/2021    Procedure: EXAM UNDER ANESTHESIA;  Surgeon: Leandro Charles MD;  Location: [facility] OR     EXCISE LESION RECTUM N/A 4/30/2021    Procedure: Excision of coccygeal cyst;  Surgeon: Leandro Charles MD;  Location: [facility] OR     LAPAROSCOPIC CHOLECYSTECTOMY  10/9/2012    Procedure: LAPAROSCOPIC CHOLECYSTECTOMY;  Laparoscopic Cholecystectomy;  Surgeon: Martell Briscoe MD;  Location: [facility] OR     LAPAROSCOPIC TUBAL LIGATION       PROCTOSCOPY N/A 4/30/2021    Procedure: Proctoscopy;  Surgeon: Leandro Charles MD;  Location: [facility] OR     Family History:   Family History   Problem  Relation Age of Onset     Diabetes Maternal Grandfather      Hypertension Maternal Grandfather      Hyperlipidemia Maternal Grandfather      Deep Vein Thrombosis Maternal Grandmother 32     Depression Mother      Unknown/Adopted Father      Diabetes Other      Unknown/Adopted Paternal Grandmother      C.A.D. No family hx of      Cerebrovascular Disease No family hx of      Anesthesia Reaction No family hx of      Arthritis No family hx of      Asthma No family hx of      Breast Cancer No family hx of      Cancer - colorectal No family hx of      Prostate Cancer No family hx of      Thyroid Disease No family hx of      Lipids No family hx of      Congenital Anomalies No family hx of      Social History:   Social History     Tobacco Use     Smoking status: Former Smoker     Packs/day: 0.50     Years: 10.00     Pack years: 5.00     Types: Cigarettes, Other     Quit date: 10/16/2012     Years since quittin.4     Smokeless tobacco: Never Used     Tobacco comment: Vaping now    Substance Use Topics     Alcohol use: Yes     Comment: Maybe once a year       Review of Systems:  Constitutional:  NEGATIVE for fever, chills, change in weight  Integumentary/Skin:  NEGATIVE for worrisome rashes, moles or lesions  Eyes:  NEGATIVE for vision changes or irritation  ENT/Mouth:  NEGATIVE for ear, mouth and throat problems  Resp:  NEGATIVE for significant cough or SOB  Breast:  NEGATIVE for masses, tenderness or discharge  CV:  NEGATIVE for chest pain, palpitations or peripheral edema  GI:  NEGATIVE for nausea, abdominal pain, heartburn, or change in bowel habits  :  Negative   Musculoskeletal:  See HPI above  Neuro:  NEGATIVE for weakness, dizziness or paresthesias  Endocrine:  NEGATIVE for temperature intolerance, skin/hair changes  Heme/allergy/immune:  NEGATIVE for bleeding problems  Psychiatric:  NEGATIVE for changes in mood or affect    OBJECTIVE:  Physical Exam:  /87 (BP Location: Left arm, Patient Position: Sitting,  "Cuff Size: Adult Regular)   Pulse 103   Ht 1.689 m (5' 6.5\")   Wt 99.8 kg (220 lb)   LMP 03/23/2022 (Approximate)   SpO2 98%   BMI 34.98 kg/m    General Appearance: healthy, alert and in no distress   Skin: no suspicious lesions or rashes  Neuro: Normal strength and tone, mentation intact and speech normal  Vascular: good pulses, and cappillary refill   Lymph: no lymphadenopathy   Psych:  mentation appears normal and affect normal/bright  Resp: no increased work of breathing    Neck Exam:  Cervical range of motion: full causes neck pain and does cause some shoulder pain.  Sensory deficits: none  Motor deficits: none    Right Shoulder Exam:  Shoulder Inspection: no swelling, bruising, discoloration, or obvious deformity or asymmetry  no atrophy  Tender: diffuse, including shoulder girdle    Range of Motion:   Active: forward flexion: full, internal rotation: full   Passive: forward flexion: full, external rotation: full  Strength: forward flexion: 5/5, external rotation: 5/5, Liftoff: Able  Impingement: all grade 0  Special tests: Speed: Negative  Anterior Drawer: 0  Posterior Drawer: 0  Spurling's: Positive  Adsons: equivocal    X-rays:  Obtained today, right shoulder are reviewed in the office with the patient: normal. Type 2 acromion    MRI:   none      ASSESSMENT:  Encounter Diagnosis   Name Primary?     Right shoulder pain, unspecified chronicity Yes      Cervical mediated pain most likely    PLAN:  physical therapy ordered for neck, shoulder  neurontin prescription written  I think would be better followed by sports med or spine.    Return to clinic:6 weeks or as needed     MASHA Hickey MD  Dept. Orthopedic Surgery  [facility]      Again, thank you for allowing me to participate in the care of your patient.        Sincerely,        Alli Hickey MD    "

## 2022-04-18 ENCOUNTER — ANCILLARY PROCEDURE (OUTPATIENT)
Dept: MRI IMAGING | Facility: CLINIC | Age: 47
End: 2022-04-18
Attending: NURSE PRACTITIONER
Payer: COMMERCIAL

## 2022-04-18 DIAGNOSIS — M54.42 ACUTE RIGHT-SIDED LOW BACK PAIN WITH BILATERAL SCIATICA: ICD-10-CM

## 2022-04-18 DIAGNOSIS — M54.41 ACUTE RIGHT-SIDED LOW BACK PAIN WITH BILATERAL SCIATICA: ICD-10-CM

## 2022-04-18 PROCEDURE — 72148 MRI LUMBAR SPINE W/O DYE: CPT | Mod: TC | Performed by: RADIOLOGY

## 2022-04-19 DIAGNOSIS — M54.42 ACUTE RIGHT-SIDED LOW BACK PAIN WITH BILATERAL SCIATICA: Primary | ICD-10-CM

## 2022-04-19 DIAGNOSIS — M54.41 ACUTE RIGHT-SIDED LOW BACK PAIN WITH BILATERAL SCIATICA: Primary | ICD-10-CM

## 2022-04-25 ENCOUNTER — MYC MEDICAL ADVICE (OUTPATIENT)
Dept: FAMILY MEDICINE | Facility: CLINIC | Age: 47
End: 2022-04-25
Payer: COMMERCIAL

## 2022-04-25 DIAGNOSIS — M48.061 SPINAL STENOSIS OF LUMBAR REGION, UNSPECIFIED WHETHER NEUROGENIC CLAUDICATION PRESENT: Primary | ICD-10-CM

## 2022-04-25 DIAGNOSIS — M25.511 ACUTE PAIN OF RIGHT SHOULDER: ICD-10-CM

## 2022-04-25 DIAGNOSIS — R20.0 NUMBNESS OF FINGERS: ICD-10-CM

## 2022-04-25 NOTE — TELEPHONE ENCOUNTER
Routing to Lucina Sagastume to review and advise. Pt has been seen by Lucina on 4/8/22 and then orthopedics (Dr. Jackson) on 4/14/22. Per 4/8/22, pt advised to see care at urgent care for acute changes including worsening pain, numbness, and tingling. Will route for recommendations.    4/8/22 - Lucina Sagastume  Acute pain of right shoulder  Counseled on self-care measures including: ice/heat, OTC pain medications; and warning signs of when to seek urgent medical care including: new numbness/tingling/weakness, worsening symptoms.  - Physical Therapy Referral; Future  - Orthopedic  Referral; Future    4/14/22 - Dr. Hickey (ortho)  PLAN:  physical therapy ordered for neck, shoulder  neurontin prescription written  I think would be better followed by sports med or spine.     Return to clinic:6 weeks or as needed      Jaylin CANO RN, BSN  [facility]

## 2022-04-25 NOTE — TELEPHONE ENCOUNTER
I recommend that she start PT if she has not yet. I have also put in a referral to the spine specialist for further evaluation. Continue gabapentin TID. She may take OTC ibuprofen PRN.     MEENAKSHI Jacobo, CNP

## 2022-04-26 NOTE — TELEPHONE ENCOUNTER
SPINE PATIENTS - NEW PROTOCOL PREVISIT    RECORDS RECEIVED FROM: Internal   REASON FOR VISIT: Acute right-sided low back pain with bilateral sciatica   Date of Appt: 05/12/2022   NOTES (FOR ALL VISITS) STATUS DETAILS   OFFICE NOTE from referring provider Internal 04/25/2022 Dr Mitesh CAMPBELL my chart message   04/08/2022 Dr Mitesh CAMPBELL    OFFICE NOTE from other specialist N/A    DISCHARGE SUMMARY from hospital N/A    DISCHARGE REPORT from ER N/A    EMG REPORT N/A    OP NOTE Internal 04/30/2021 Excision of coccygeal cyst   IMAGING  (FOR ALL VISITS)     MRI (HEAD, NECK, SPINE) Internal 04/18/2022 lumbar spine  04/14/2021 sacrum and coccyx    XRAY (SPINE) *NEUROSURGERY* Internal 04/07/2021 sacrum and coccyx  03/17/2021 lumbar spine   CT (HEAD, NECK, SPINE) N/A

## 2022-05-03 ENCOUNTER — OFFICE VISIT (OUTPATIENT)
Dept: VASCULAR SURGERY | Facility: CLINIC | Age: 47
End: 2022-05-03
Attending: NURSE PRACTITIONER
Payer: COMMERCIAL

## 2022-05-03 DIAGNOSIS — I83.812 VARICOSE VEINS OF LEFT LOWER EXTREMITY WITH PAIN: Primary | ICD-10-CM

## 2022-05-03 DIAGNOSIS — Z86.718 HISTORY OF DEEP VENOUS THROMBOSIS: ICD-10-CM

## 2022-05-03 DIAGNOSIS — I83.893 SYMPTOMATIC VARICOSE VEINS OF BOTH LOWER EXTREMITIES: ICD-10-CM

## 2022-05-03 DIAGNOSIS — I83.892 VARICOSE VEINS OF LEG WITH SWELLING, LEFT: ICD-10-CM

## 2022-05-03 PROCEDURE — 99204 OFFICE O/P NEW MOD 45 MIN: CPT | Performed by: SPECIALIST

## 2022-05-03 NOTE — NURSING NOTE
Patient Reported symptoms:        Left Leg   Heaviness Most of the time   Achiness Most of the time   Swelling Most of the time   Throbbing A good bit of the time   Itching A good bit of the time   Appearance Very noticeable   Impact on work/activities Symptoms but full able to participate

## 2022-05-03 NOTE — PROGRESS NOTES
Consult requested by Trupti Au      Reason for consultation: Left leg varicose veins and pain    HPI:  Patient is a 47-year-old lady presenting with a 10-year history of left leg varicose veins.  She reports pain swelling and aching worse at the end of the day.  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  She does have a history of an unprovoked DVT about 10 years ago.  She was placed on Coumadin for 3 months but then stopped afterwards.  There does not be appear to be have been any further evaluation.  She has never seen a hematologist.  There is a family history of a DVT in her maternal grandmother as well.  She is not sure if she has had a hypercoagulable work-up.  She denies any leg trauma, nonhealing wounds or family history of varicose veins.  She has worn socks intermittently but complains of a rash from a prescription pair she received years ago.  She now presents for evaluation of her left leg varicose veins, pain and swelling.    Past Medical History:   Diagnosis Date     Benign essential hypertension      Depression      DVT (deep venous thrombosis) (H) 04/2011    left leg, was on lovenox and coumadin but have been off since November 2011     Essential hypertension 12/20/2021     H/O tubal ligation     2006      Obesity      Prediabetes      Past Surgical History:   Procedure Laterality Date     EXAM UNDER ANESTHESIA ANUS N/A 4/30/2021    Procedure: EXAM UNDER ANESTHESIA;  Surgeon: Leandro Charles MD;  Location: [facility] OR     EXCISE LESION RECTUM N/A 4/30/2021    Procedure: Excision of coccygeal cyst;  Surgeon: Leandro Charles MD;  Location: [facility] OR     LAPAROSCOPIC CHOLECYSTECTOMY  10/9/2012    Procedure: LAPAROSCOPIC CHOLECYSTECTOMY;  Laparoscopic Cholecystectomy;  Surgeon: Martell Briscoe MD;  Location: [facility] OR     LAPAROSCOPIC TUBAL LIGATION       PROCTOSCOPY N/A 4/30/2021    Procedure: Proctoscopy;  Surgeon: Leandro Charles MD;  Location: [facility] OR     Current Outpatient  Medications   Medication     busPIRone (BUSPAR) 5 MG tablet     cyclobenzaprine (FLEXERIL) 10 MG tablet     gabapentin (NEURONTIN) 100 MG capsule     ibuprofen (ADVIL/MOTRIN) 800 MG tablet     losartan (COZAAR) 50 MG tablet     naproxen (NAPROSYN) 500 MG tablet     ondansetron (ZOFRAN ODT) 4 MG ODT tab     order for DME     order for DME     SUMAtriptan (IMITREX) 50 MG tablet     venlafaxine (EFFEXOR-XR) 150 MG 24 hr capsule     venlafaxine (EFFEXOR-XR) 75 MG 24 hr capsule     No current facility-administered medications for this visit.        Allergies   Allergen Reactions     Chantix [Varenicline Tartrate] Anxiety     Social History     Socioeconomic History     Marital status:      Spouse name: Not on file     Number of children: 4     Years of education: Not on file     Highest education level: Not on file   Occupational History     Occupation: pharmacy tech   Tobacco Use     Smoking status: Former Smoker     Packs/day: 0.50     Years: 10.00     Pack years: 5.00     Types: Cigarettes, Other     Quit date: 10/16/2012     Years since quittin.5     Smokeless tobacco: Never Used     Tobacco comment: Vaping now    Vaping Use     Vaping Use: Never used   Substance and Sexual Activity     Alcohol use: Yes     Comment: Maybe once a year     Drug use: No     Sexual activity: Yes     Partners: Male     Birth control/protection: Surgical     Comment:  one partner   Other Topics Concern     Parent/sibling w/ CABG, MI or angioplasty before 65F 55M? No   Social History Narrative     Not on file     Social Determinants of Health     Financial Resource Strain: Not on file   Food Insecurity: Not on file   Transportation Needs: Not on file   Physical Activity: Not on file   Stress: Not on file   Social Connections: Not on file   Intimate Partner Violence: Not on file   Housing Stability: Not on file     Family History   Problem Relation Age of Onset     Diabetes Maternal Grandfather      Hypertension Maternal  Grandfather      Hyperlipidemia Maternal Grandfather      Deep Vein Thrombosis Maternal Grandmother 32     Depression Mother      Unknown/Adopted Father      Diabetes Other      Unknown/Adopted Paternal Grandmother      C.A.D. No family hx of      Cerebrovascular Disease No family hx of      Anesthesia Reaction No family hx of      Arthritis No family hx of      Asthma No family hx of      Breast Cancer No family hx of      Cancer - colorectal No family hx of      Prostate Cancer No family hx of      Thyroid Disease No family hx of      Lipids No family hx of      Congenital Anomalies No family hx of       ROS: 10 point ROS neg other than the symptoms noted above in the HPI.    PE:  B/P: Data Unavailable, T: Data Unavailable, P: Data Unavailable, R: Data Unavailable  General: well developed, well nourished WF who appears her stated age  HEENT: NC/AT, EOMI, (-)icterus, (-)injection  Neck: Supple, No JVD  Chest: CTA  Heart: RRR  Abd: Soft, non tender, non distended, non tender, no masses  Ext; Warm, right lower extremity: No edema, minimal scattered varicosities.  Left lower extremity: Multiple calf varicosities, +1 edema, several spots of psoriatic rash.  No stasis changes.  Psych: AAOx3  Neuro: No focal deficits      Impression/plan:  This is a 47 lady with symptomatic left leg varicose veins.  She has a CEAP 3 in the left leg and CEAP 2 right.  She also has a history of an unprovoked DVT in the left leg and these veins may represent her only outflow from the leg.  I discussed these findings along with the role of ultrasound and compression with the patient and she expressed understanding.  Also as her DVT was unprovoked I did recommend hematology evaluation and she is in agreement with that.  After discussion with the patient the plan at this time is to restart her in compression socks.  She was given information on higher cotton content socks which should help with the rash.  An ultrasound also be ordered to  evaluate her for reflux as well as the extent of her previous DVT.  Lastly she will be given hematology referral to determine whether she should be on lifelong anticoagulation due to the family history of DVT and this other DVT being unprovoked.  She will follow-up with me for video visit after the ultrasound.  Any further treatment will be determined by the ultrasound findings, hematology findings and response to compression therapy.    Urbano Bowman MD, FACS

## 2022-05-03 NOTE — LETTER
5/3/2022         RE: Roxane Lopes  [address] [place] [id number]        Dear Colleague,    Thank you for referring your patient, Roxane Lopes, to the [facility] [place]. Please see a copy of my visit note below.    Consult requested by Trupti Au      Reason for consultation: Left leg varicose veins and pain    HPI:  Patient is a 47-year-old lady presenting with a 10-year history of left leg varicose veins.  She reports pain swelling and aching worse at the end of the day.  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  She does have a history of an unprovoked DVT about 10 years ago.  She was placed on Coumadin for 3 months but then stopped afterwards.  There does not be appear to be have been any further evaluation.  She has never seen a hematologist.  There is a family history of a DVT in her maternal grandmother as well.  She is not sure if she has had a hypercoagulable work-up.  She denies any leg trauma, nonhealing wounds or family history of varicose veins.  She has worn socks intermittently but complains of a rash from a prescription pair she received years ago.  She now presents for evaluation of her left leg varicose veins, pain and swelling.    Past Medical History:   Diagnosis Date     Benign essential hypertension      Depression      DVT (deep venous thrombosis) (H) 04/2011    left leg, was on lovenox and coumadin but have been off since November 2011     Essential hypertension 12/20/2021     H/O tubal ligation     2006      Obesity      Prediabetes      Past Surgical History:   Procedure Laterality Date     EXAM UNDER ANESTHESIA ANUS N/A 4/30/2021    Procedure: EXAM UNDER ANESTHESIA;  Surgeon: Leandro Charles MD;  Location: [facility] OR     EXCISE LESION RECTUM N/A 4/30/2021    Procedure: Excision of coccygeal cyst;  Surgeon: Leandro Charles MD;  Location: [facility] OR     LAPAROSCOPIC CHOLECYSTECTOMY  10/9/2012    Procedure: LAPAROSCOPIC  CHOLECYSTECTOMY;  Laparoscopic Cholecystectomy;  Surgeon: Martell Briscoe MD;  Location: [facility] OR     LAPAROSCOPIC TUBAL LIGATION       PROCTOSCOPY N/A 2021    Procedure: Proctoscopy;  Surgeon: Leandro Charles MD;  Location: [facility] OR     Current Outpatient Medications   Medication     busPIRone (BUSPAR) 5 MG tablet     cyclobenzaprine (FLEXERIL) 10 MG tablet     gabapentin (NEURONTIN) 100 MG capsule     ibuprofen (ADVIL/MOTRIN) 800 MG tablet     losartan (COZAAR) 50 MG tablet     naproxen (NAPROSYN) 500 MG tablet     ondansetron (ZOFRAN ODT) 4 MG ODT tab     order for DME     order for DME     SUMAtriptan (IMITREX) 50 MG tablet     venlafaxine (EFFEXOR-XR) 150 MG 24 hr capsule     venlafaxine (EFFEXOR-XR) 75 MG 24 hr capsule     No current facility-administered medications for this visit.        Allergies   Allergen Reactions     Chantix [Varenicline Tartrate] Anxiety     Social History     Socioeconomic History     Marital status:      Spouse name: Not on file     Number of children: 4     Years of education: Not on file     Highest education level: Not on file   Occupational History     Occupation: pharmacy tech   Tobacco Use     Smoking status: Former Smoker     Packs/day: 0.50     Years: 10.00     Pack years: 5.00     Types: Cigarettes, Other     Quit date: 10/16/2012     Years since quittin.5     Smokeless tobacco: Never Used     Tobacco comment: Vaping now    Vaping Use     Vaping Use: Never used   Substance and Sexual Activity     Alcohol use: Yes     Comment: Maybe once a year     Drug use: No     Sexual activity: Yes     Partners: Male     Birth control/protection: Surgical     Comment:  one partner   Other Topics Concern     Parent/sibling w/ CABG, MI or angioplasty before 65F 55M? No   Social History Narrative     Not on file     Social Determinants of Health     Financial Resource Strain: Not on file   Food Insecurity: Not on file   Transportation Needs: Not on file   Physical  Activity: Not on file   Stress: Not on file   Social Connections: Not on file   Intimate Partner Violence: Not on file   Housing Stability: Not on file     Family History   Problem Relation Age of Onset     Diabetes Maternal Grandfather      Hypertension Maternal Grandfather      Hyperlipidemia Maternal Grandfather      Deep Vein Thrombosis Maternal Grandmother 32     Depression Mother      Unknown/Adopted Father      Diabetes Other      Unknown/Adopted Paternal Grandmother      C.A.D. No family hx of      Cerebrovascular Disease No family hx of      Anesthesia Reaction No family hx of      Arthritis No family hx of      Asthma No family hx of      Breast Cancer No family hx of      Cancer - colorectal No family hx of      Prostate Cancer No family hx of      Thyroid Disease No family hx of      Lipids No family hx of      Congenital Anomalies No family hx of       ROS: 10 point ROS neg other than the symptoms noted above in the HPI.    PE:  B/P: Data Unavailable, T: Data Unavailable, P: Data Unavailable, R: Data Unavailable  General: well developed, well nourished WF who appears her stated age  HEENT: NC/AT, EOMI, (-)icterus, (-)injection  Neck: Supple, No JVD  Chest: CTA  Heart: RRR  Abd: Soft, non tender, non distended, non tender, no masses  Ext; Warm, right lower extremity: No edema, minimal scattered varicosities.  Left lower extremity: Multiple calf varicosities, +1 edema, several spots of psoriatic rash.  No stasis changes.  Psych: AAOx3  Neuro: No focal deficits      Impression/plan:  This is a 47 lady with symptomatic left leg varicose veins.  She has a CEAP 3 in the left leg and CEAP 2 right.  She also has a history of an unprovoked DVT in the left leg and these veins may represent her only outflow from the leg.  I discussed these findings along with the role of ultrasound and compression with the patient and she expressed understanding.  Also as her DVT was unprovoked I did recommend hematology  evaluation and she is in agreement with that.  After discussion with the patient the plan at this time is to restart her in compression socks.  She was given information on higher cotton content socks which should help with the rash.  An ultrasound also be ordered to evaluate her for reflux as well as the extent of her previous DVT.  Lastly she will be given hematology referral to determine whether she should be on lifelong anticoagulation due to the family history of DVT and this other DVT being unprovoked.  She will follow-up with me for video visit after the ultrasound.  Any further treatment will be determined by the ultrasound findings, hematology findings and response to compression therapy.    Urbano Bowman MD, FACS        Again, thank you for allowing me to participate in the care of your patient.        Sincerely,        Urbano Bowman MD

## 2022-05-04 NOTE — PROGRESS NOTES
"    SUBJECTIVE:  HPI:  Roxane Lopes  Is a 47 year old female who presents today for new patient evaluation of acute right-sided low back pain upon referral from nurse practitioner Lucina Leslie whose 4/8/2022 office note documents the following history: \"Patient has notice pain and burning sensation radiating from her buttock to posterior knees that past 2 weeks. Worse when she is up standing and walking. Pain in the right lower back with certain movements but no back pain in between. Denies localized pain, warm, redness in the upper legs. Denies change in bowel or bladder, saddle anesthesia. She did have an abscess on her coccyx last year that was debrided and notes that she will still get occasional pains in her tailbone but nothing as bad as last year. Denies fever, chills, vomiting, diarrhea, drainage. \"    4/18/2022 lumbar MRI shows a central disc extrusion at L4-5 resulting in mild central stenosis moderate bilateral recess stenosis, mild displacement of bilateral L5 nerve roots.  L5-S1 also has a broad-based disc osteophyte complex mildly eccentric to the right neural foramen with moderate facet arthropathy, mild bilateral lateral recess stenosis, mild to moderate right and left neuroforaminal stenosis.    And he clarifies that 15 years ago she gave birth to her son who she \"carried low\" and she experienced some low back pain for the first time that.  Lasted for a while but eventually settled out and went away.  She has had a couple of episodes of mild low back pain a year since that time very similar to the current pain that she is having.  What is different about this pain is that it is lasting longer than usual but it is definitely improved over 2 months ago.  If she bends to the right she will rarely get a little bit of stretching her right thigh but it is more common if you to the left that she will feel it in the left lateral thigh, but there is no true leg pain, numbness, tingling, weakness, bowel " "or bladder dysfunction, or saddle anesthesia..  Gabapentin 100 mg at at bedtime really helps her after a long day at work when she is on her feet all day.  She just started PT today in [place].  Roxane Messer dictated history and said \"you are nailing it\".      SYMPTOMS WORSENED WITH bending    SYMPTOMS IMPROVED WITH stretching her back, gabapentin.  Ibuprofen does not help.    Pain score and diagram reviewed.  See questionnaire.      ROS: .  Otherwise negative for bowel/bladder dysfunction, balance changes, headache, leg pain/numbness/weakness, fevers, chills, night sweats, unexplained weight loss;  otherwise unremarkable.   See the patient's intake questionnaire from today for details.    Treatment to Date: Gabapentin, PT, ibuprofen    MEDICATIONS:  Reviewed.    ALLERGIES:  Reviewed.     Reviewed past medical, surgical, and family history.    Pertinent for PTSD, history of DVT (not currently anticoagulated), hypertension, obesity, GERD, anxiety, smoker    SOCIAL HX: She is  and has 4 children.  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  The patient is well nourished and well groomed.  She transitions easily and moves fluidly.  BMI is increased.  --PSYCHIATRIC:  Appropriate mood and affect. The patient is awake, alert, oriented to person, place, time and answering questions appropriately with clear speech.    --SKIN:  Skin over the face, bilateral lower extremities, and posterior torso is clean, dry, intact without rashes.  --RESPIRATORY: Normal respiratory excursion and effort, and no dyspnea.   --GAIT:  is non-antalgic. Flat foot, heel and toe walking:  normal   .  Squat and rise   normal    .  --STANDING EXAMINATION:    Symmetry of spine/pelvis   unremarkable   .      Range of motion full with pain with flexion and the arm pain that comes is in the midline with extension at about " L5.   Standing flexion   negative   .    Eric's sign   negative    .     Stork test   negative    .   --NEUROLOGICAL:     ROMBERG, TANDEM WALK, PRONATOR DRIFT:   Normal.   .  SENSATION to light touch is intact in bilateral thighs, lower legs and feet.   REFLEXES:  patellar 2+, and achilles absent.  Babinski is negative. No clonus.  MANUAL MOTOR TESTING:  L3- S1 Myotomes, Femoral, Obturator, Peroneal and Tibial nerves 5/5   DURAL STRETCH TESTS:  SLR negative.  Femoral Stretch Test not done.   --PELVIC/HIP JOINTS:                Long Sitting   negative   .    Hip scour   negative   .    Hip Impingement   negative   .   ADRIEN   negative    .     Piriformis   negative   .     Spring testing positive mostly at L5 moderately at both SI   joints negative above L5    PELVIC ALIGNMENT neutral.   --LUMBAR/GLUTEAL MUSCLES: No spasm, tenderness, trigger points.    --ABDOMINAL:  Non-distended.  Nontender  --VASCULAR:  Lower extremity capillary refill, temperature and color normal.  Bruises on her left lower extremity associated with some venous varicosities but with no trauma.  These have occurred sporadically for years.         IMAGING: Images and report reviewed    MR LUMBAR SPINE W/O CONTRAST 4/18/2022    L3-L4: Normal disc height with loss of disc signal. Broad-based posterior disc bulge. Mild facet arthropathy. No central spinal canal stenosis, though there is mild lateral recess stenosis bilaterally. No right neural foraminal stenosis. No left neural   foraminal stenosis.     L4-L5: Mild loss of disc height and signal. Broad-based posterior disc bulge with superimposed central disc extrusion measuring 4 mm AP x 6 mm CC with 6 mm caudal migration (sagittal T2 series 2, image #9). Mild facet arthropathy. Mild spinal canal stenosis. Moderate lateral recess stenosis bilaterally with abutment and mild displacement of the traversing L5 nerve roots. No right neural foraminal stenosis. No left neural foraminal  stenosis.     L5-S1: Moderate loss of disc height and signal. Broad-based circumferential disc osteophyte complex, mildly eccentric to the right neural foramen. Moderate facet arthropathy. No central spinal canal stenosis, though there is mild lateral recess stenosis bilaterally. Mild to moderate right neural foraminal stenosis. Mild to moderate left neural foraminal stenosis.                                                                 CONCLUSION:  1.  Multilevel degenerative changes of the lumbar spine as detailed above,, greatest at L4-L5, where there is mild spinal canal stenosis and moderate lateral recess stenosis bilaterally with abutment and mild displacement of the traversing L5 nerve   roots.  2.  At L5-S1, there is mild lateral recess stenosis bilaterally and mild to moderate bilateral neural foraminal stenosis.    ASSESSMENT: Roxane Lopes is a 47 year old female who presents  today for new patient evaluation of:      Chronic intermittent midline nonradiating low back pain x15 years, with recent 2-month flare    Moderate L5 facet arthropathy which I think is the pain generator    No Pelvic Joint Dysfunction    Neurologically intact      PLAN:  Continue the physical therapy and maintain a home program that they teach her forever.  I think she can wean off the gabapentin once her pain settles down but she is going to have persistent recurrent flares of this because of the facet arthropathy and I explained this to her and showed her her images.  Thank you there is any reason to believe this is radiculopathy or neuropathic pain, and she is continuing with her weight loss and does not show any significant strength deficits so I do not think a MedX program will be indicated.  In addition MedX bowels strengthening the back and lumbar extension which is the one painful range she has that might not be the best choice.  Her symptoms are nowhere near severe enough for me to recommend an RFA work-up which we did  discuss and she agrees with me.  She can always do that later if she has persistent more severe pain.    I would like to thank Lucina Anuj for the opportunity to participate in the care of this patient.    Advised patient to call or return early if symptoms worsen, or having problems controlling bladder and bowel function or worsening leg weakness.     Please note: Voice recognition software was used in this dictation.  It may therefore contain typographical errors.    Clovis García MD

## 2022-05-05 ENCOUNTER — THERAPY VISIT (OUTPATIENT)
Dept: PHYSICAL THERAPY | Facility: CLINIC | Age: 47
End: 2022-05-05
Attending: ORTHOPAEDIC SURGERY
Payer: COMMERCIAL

## 2022-05-05 DIAGNOSIS — M54.2 NECK PAIN: ICD-10-CM

## 2022-05-05 DIAGNOSIS — M25.511 RIGHT SHOULDER PAIN, UNSPECIFIED CHRONICITY: ICD-10-CM

## 2022-05-05 DIAGNOSIS — M79.621 PAIN OF RIGHT UPPER ARM: ICD-10-CM

## 2022-05-05 DIAGNOSIS — M25.511 ACUTE PAIN OF RIGHT SHOULDER: ICD-10-CM

## 2022-05-05 DIAGNOSIS — M25.511 ACUTE PAIN OF RIGHT SHOULDER: Primary | ICD-10-CM

## 2022-05-05 DIAGNOSIS — M19.111 POST-TRAUMATIC OSTEOARTHRITIS OF RIGHT SHOULDER: ICD-10-CM

## 2022-05-05 PROCEDURE — 97140 MANUAL THERAPY 1/> REGIONS: CPT | Mod: GP | Performed by: PHYSICAL THERAPIST

## 2022-05-05 PROCEDURE — 97161 PT EVAL LOW COMPLEX 20 MIN: CPT | Mod: GP | Performed by: PHYSICAL THERAPIST

## 2022-05-05 PROCEDURE — 97110 THERAPEUTIC EXERCISES: CPT | Mod: GP | Performed by: PHYSICAL THERAPIST

## 2022-05-05 NOTE — PROGRESS NOTES
Physical Therapy Initial Evaluation  Subjective:  The history is provided by the patient. No  was used.   Patient Health History  Roxane Lopes being seen for Right  Shoulder.     Problem began: 4/14/2022.   Problem occurred: unknown   Pain is reported as 2/10 on pain scale.  General health as reported by patient is fair.  Pertinent medical history includes: depression, high blood pressure, migraines/headaches, numbness/tingling and overweight.   Red flags:  Calf pain-swelling-warmth and pain at rest/night.  Medical allergies: none.   Surgeries include:  Other. Other surgery history details: cyst on tailbone.    Current medications:  Anti-depressants, high blood pressure medication, muscle relaxants and other. Other medications details: gabapentin.    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   Primary job tasks include:  Computer work and prolonged standing.                  Therapist Generated HPI Evaluation  Problem details: Patient presents to PT with c/o R shoulder/arm/neck pain; ongoing for a few months. Patient stated she woke up one day and had pain along the front and back of R shoulder; pain gradually progressed to R side of breast; now has numbness and heaviness in the R arm to fingers.  .         Type of problem:  Right shoulder.    This is a chronic condition.  Condition occurred with:  Repetition/overuse.  Where condition occurred: for unknown reasons.  Patient reports pain:  In the joint, anterior and posterior.  Pain is described as aching and is constant.  Pain radiates to:  Upper arm, cervical and other (forearm). Pain is the same all the time.  Since onset symptoms are gradually worsening.  Associated symptoms:  Painful arc and other (fatigue). Symptoms are exacerbated by carrying, lifting, certain positions and lying on extremity  and relieved by muscle relaxants and activity/movement.      Restrictions due to condition include:  Working in normal job without  restrictions.  Barriers include:  None as reported by patient.                        Objective:  Standing Alignment:    Cervical/Thoracic:  Forward head, cervical lordosis decreased, thoracic kyphosis increased and Dowager's hump  Shoulder/UE:  Rounded shoulders, protracted scapula L, protracted scapula R and depressed scapula R  Lumbar:  Lordosis decr                                Cervical/Thoracic Evaluation    AROM:  AROM Cervical:    Flexion:          Chin to chest  Extension:       Min loss  Rotation:         Left: min loss; pain     Right: min loss; pain  Side Bend:      Left:     Right:       Headaches: migraine  Cervical Myotomes:  normal                        Cervical Palpation:    Tenderness present at Left:    Upper Trap; Levator; Erector Spinae and Suboccipitals  Tenderness present at Right:    Upper Trap; Levator; Erector Spinae and Suboccipitals               Shoulder Evaluation:  ROM:  AROM:    Flexion:  Left:  168    Right:  154  Extension: Left: 58Right: 60  Abduction:  Left: 166   Right:  162                            Strength:    Flexion: Left:5/5   Pain:    Right: 4+/5  Strong/painful     Pain:   Extension:  Left: 5/5    Pain:    Right: 5/5    Strong/painful  Pain:  Abduction:  Left: 4+/5  Pain:    Right: 4/5   Strong/painful    Pain:  Adduction:  Left: 5/5    Pain:    Right: 5/5     Pain:  Internal Rotation:  Left:5/5     Pain:    Right: 4+/5   Strong/painful    Pain:                Palpation:      Right shoulder tenderness present at: Acrimioclavicular; Levator; Upper Trap and Bicipital Groove                                     General     ROS    Assessment/Plan:    Patient is a 47 year old female with cervical and right side shoulder complaints.    Patient has the following significant findings with corresponding treatment plan.                Diagnosis 1:  R shoulder/cervical pain (R arm pain)  Pain -  hot/cold therapy, US, mechanical traction and manual therapy  Impaired muscle  performance - neuro re-education  Decreased function - therapeutic activities  Impaired posture - neuro re-education    Therapy Evaluation Codes:   1) History comprised of:   Personal factors that impact the plan of care:      None.    Comorbidity factors that impact the plan of care are:      Depression, High blood pressure, Migraines/headaches, Numbness/tingling and Overweight.     Medications impacting care: Anti-depressant, High blood pressure, Muscle relaxant and gabapentin.  2) Examination of Body Systems comprised of:   Body structures and functions that impact the plan of care:      Cervical spine, Shoulder and R arm pain.   Activity limitations that impact the plan of care are:      Lifting, Sleeping and Laying down.  3) Clinical presentation characteristics are:   Stable/Uncomplicated.  4) Decision-Making    Low complexity using standardized patient assessment instrument and/or measureable assessment of functional outcome.  Cumulative Therapy Evaluation is: Low complexity.    Previous and current functional limitations:  (See Goal Flow Sheet for this information)    Short term and Long term goals: (See Goal Flow Sheet for this information)     Communication ability:  Patient appears to be able to clearly communicate and understand verbal and written communication and follow directions correctly.  Treatment Explanation - The following has been discussed with the patient:   RX ordered/plan of care  Anticipated outcomes  Possible risks and side effects  This patient would benefit from PT intervention to resume normal activities.   Rehab potential is good.    Frequency:  1 X week, once daily  Duration:  for 10 weeks  Discharge Plan:  Achieve all LTG.  Independent in home treatment program.  Reach maximal therapeutic benefit.    Please refer to the daily flowsheet for treatment today, total treatment time and time spent performing 1:1 timed codes.

## 2022-05-06 NOTE — PROGRESS NOTES
[facility]    OUTPATIENT Physical Therapy ORTHOPEDIC EVALUATION  PLAN OF TREATMENT FOR OUTPATIENT REHABILITATION  (COMPLETE FOR INITIAL CLAIMS ONLY)  Patient's Last Name, First Name, M.I.  YOB: 1975  Roxane Lopes    Provider s Name:  [facility]   Medical Record No.  [id number]   Start of Care Date:  05/05/22   Onset Date:   04/14/22   Type:     _X__PT   ___OT Medical Diagnosis:    Encounter Diagnoses   Name Primary?    Right shoulder pain, unspecified chronicity     Acute pain of right shoulder     Post-traumatic osteoarthritis of right shoulder     Pain of right upper arm     Neck pain         Treatment Diagnosis:  R shoulder pain        Goals:     05/05/22 0500   Body Part   Goals listed below are for R shoulder   Goal #1   Goal #1 sleeping   Previous Functional Level No restrictions   Performance Level unable to sleep on Right side; 8/10 pl   Performance Level able to lay on R side; 4/10 pl   Rationale to establish restorative sleep pattern   Due Date 06/09/22   Performance level able to lay on R side; 0/10 pl   Rationale to establish restorative sleep pattern   Due Date 07/14/22       Therapy Frequency:  1x/week  Predicted Duration of Therapy Intervention:  x10 weeks    Carmen Chen, PT                 I CERTIFY THE NEED FOR THESE SERVICES FURNISHED UNDER        THIS PLAN OF TREATMENT AND WHILE UNDER MY CARE     (Physician attestation of this document indicates review and certification of the therapy plan).                     Certification Date From:  05/05/22   Certification Date To:  07/14/22    Referring Provider:  Alli Hickey    Initial Assessment        See Epic Evaluation SOC Date: 05/05/22

## 2022-05-12 ENCOUNTER — OFFICE VISIT (OUTPATIENT)
Dept: NEUROSURGERY | Facility: CLINIC | Age: 47
End: 2022-05-12
Payer: COMMERCIAL

## 2022-05-12 ENCOUNTER — ANCILLARY PROCEDURE (OUTPATIENT)
Dept: ULTRASOUND IMAGING | Facility: CLINIC | Age: 47
End: 2022-05-12
Attending: SPECIALIST
Payer: COMMERCIAL

## 2022-05-12 ENCOUNTER — PRE VISIT (OUTPATIENT)
Dept: NEUROSURGERY | Facility: CLINIC | Age: 47
End: 2022-05-12

## 2022-05-12 ENCOUNTER — THERAPY VISIT (OUTPATIENT)
Dept: PHYSICAL THERAPY | Facility: CLINIC | Age: 47
End: 2022-05-12
Attending: NURSE PRACTITIONER
Payer: COMMERCIAL

## 2022-05-12 VITALS
BODY MASS INDEX: 34.48 KG/M2 | HEIGHT: 67 IN | HEART RATE: 98 BPM | DIASTOLIC BLOOD PRESSURE: 91 MMHG | WEIGHT: 219.7 LBS | SYSTOLIC BLOOD PRESSURE: 128 MMHG

## 2022-05-12 DIAGNOSIS — M54.50 CHRONIC MIDLINE LOW BACK PAIN WITHOUT SCIATICA: Primary | ICD-10-CM

## 2022-05-12 DIAGNOSIS — M25.511 ACUTE PAIN OF RIGHT SHOULDER: ICD-10-CM

## 2022-05-12 DIAGNOSIS — M48.061 SPINAL STENOSIS OF LUMBAR REGION, UNSPECIFIED WHETHER NEUROGENIC CLAUDICATION PRESENT: ICD-10-CM

## 2022-05-12 DIAGNOSIS — M54.42 ACUTE RIGHT-SIDED LOW BACK PAIN WITH BILATERAL SCIATICA: ICD-10-CM

## 2022-05-12 DIAGNOSIS — R20.0 NUMBNESS OF FINGERS: ICD-10-CM

## 2022-05-12 DIAGNOSIS — M54.41 ACUTE RIGHT-SIDED LOW BACK PAIN WITH BILATERAL SCIATICA: ICD-10-CM

## 2022-05-12 DIAGNOSIS — M47.816 LUMBAR SPONDYLOSIS: ICD-10-CM

## 2022-05-12 DIAGNOSIS — G89.29 CHRONIC MIDLINE LOW BACK PAIN WITHOUT SCIATICA: Primary | ICD-10-CM

## 2022-05-12 PROCEDURE — 99204 OFFICE O/P NEW MOD 45 MIN: CPT | Performed by: PREVENTIVE MEDICINE

## 2022-05-12 PROCEDURE — 97110 THERAPEUTIC EXERCISES: CPT | Mod: GP

## 2022-05-12 PROCEDURE — 93971 EXTREMITY STUDY: CPT | Mod: LT | Performed by: SPECIALIST

## 2022-05-12 PROCEDURE — 97161 PT EVAL LOW COMPLEX 20 MIN: CPT | Mod: GP

## 2022-05-12 ASSESSMENT — PAIN SCALES - GENERAL: PAINLEVEL: NO PAIN (1)

## 2022-05-12 NOTE — PROGRESS NOTES
[facility]    OUTPATIENT Physical Therapy ORTHOPEDIC EVALUATION  PLAN OF TREATMENT FOR OUTPATIENT REHABILITATION  (COMPLETE FOR INITIAL CLAIMS ONLY)  Patient's Last Name, First Name, M.I.  YOB: 1975  Roxane Lopes    Provider s Name:  [facility]   Medical Record No.  [id number]   Start of Care Date:  05/12/22   Onset Date:   03/31/22   Type:     _X__PT   ___OT Medical Diagnosis:    Encounter Diagnosis   Name Primary?    Acute right-sided low back pain with bilateral sciatica         Treatment Diagnosis:  LBP        Goals:     05/12/22 0500   Body Part   Goals listed below are for LBP   Goal #2   Goal #2 standing   Previous Functional Level No restrictions;Hours patient could stand   Performance level 4, no pain   Current Functional Level Hours patient can stand   Performance level 2, pain 2/10   STG Target Performance Hours patient will be able to stand   Performance level 2, no pain   Rationale   (For work duties)   Due date 06/09/22   LTG Target Performance Hours patient will be able to stand   Performance Level 4, no pain   Rationale   (For work duties)   Due date 06/22/22       Therapy Frequency:  1x/week  Predicted Duration of Therapy Intervention:  6 weeks    Chika Blanco, PT                 I CERTIFY THE NEED FOR THESE SERVICES FURNISHED UNDER        THIS PLAN OF TREATMENT AND WHILE UNDER MY CARE     (Physician attestation of this document indicates review and certification of the therapy plan).                     Certification Date From:  05/12/22   Certification Date To:  06/22/22    Referring Provider:  Lucina Sagastume    Initial Assessment        See Epic Evaluation SOC Date: 05/12/22

## 2022-05-12 NOTE — PROGRESS NOTES
Physical Therapy Initial Examination/Evaluation  May 12, 2022    Roxane Lopes is a 47 year old female referred to physical therapy by Lucina Sagastume APRN CNP for treatment of Acute right-sided low back pain with bilateral sciatica with Precautions/Restrictions/MD instructions Eval and treat.     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 Pain can radiate down anterior L LE, patient is unsure if this is related, has hx of DVT in LL LE 10 years ago. Patient found to have decreased lumbar ROM, + LLNT, poor TA contraction, weak hip ABD, poor squat form, HS tightness and responded well to repeated extension. Patient given HEP with repeated extension, TA contraction, bridging, HS stretch and standing hip ABD.     Subjective:  DOI/onset: 6 weeks ago  Acute Injury or Gradual Onset?: Gradual injury over time  Mechanism of Injury: unknown  Related PMH: None   Imaging: MRI -   1.  Multilevel degenerative changes of the lumbar spine as detailed above, greatest at L4-L5, where there is mild spinal canal stenosis and moderate lateral recess stenosis bilaterally with abutment and mild displacement of the traversing L5 nerve   roots.  2.  At L5-S1, there is mild lateral recess stenosis bilaterally and mild to moderate bilateral neural foraminal stenosis.  Chief Complaint/Functional Limitations: LBP and see below in therapy evaluation codes   Pain: rest 0 /10, activity 2/10 Location: Midline LBP down front L LE Frequency: Intermittent Described as: aching and dull Previous Treatment: Exercises, pain Effect of prior treatment: good Alleviated by: Pain medications and Sitting Progression of Symptoms: Unchanged Time of day when pain is worse: Position related  Sleeping: Interrupted due to current issue   Occupation: Pharmacy tech  Job duties: keyboarding/computer use, prolonged standing  Current HEP/exercise regimen: Walking  "2-3x a few blocks   Patient's goals are see chief complaints \"To have no pain\"    Other pertinent PMH/Red Flags: Depression, High Blood Pressure, Migraines/Headaches, Overweight, DVT  Barriers at home/work: None as reported by patient  Pertinent Surgical History: Sacrum cyst removed   Medications: Anti-depressants, High blood pressure and Gabapenin  General health as reported by patient: fair  Return to MD:  PRN    Lumbar Spine Evaluation  Static Posture  Standing posture: No obvious findings  Sitting posture: Fair    Dynamic Movement Screen  Double limb squat observations: Improper use of glutes/hips and Narrow BING  Gait: No significant findings    Hip Joint Screen Negative    Trunk Range of Motion  Movement Loss Major Moderate Minimal Nil Pain   Flexion    X - to ankles    Extension   X      Sidebending R    X    Sidebending L    X    Side Gliding R    X    Side Gliding L    X       Test Movements  Rep FIS Nil Worse   Rep EIL Decreases Better     Flexibility Left Right   Hamstrings moderate none/WNL     Hip and Knee Strength   MMT Left Right   Hip Flexion 5/5 5/5   Hip Abduction 4/5 4/5   Hip ER 5/5 5/5   Hip IR 5/5 5/5     Special Tests  Neural tension: Positive  SI joint test: Positive on L     Palpation:  No tenderness to palpation    Assessment/Plan:  Patient is a 47 year old female with thoracic complaints.    Patient has the following significant findings with corresponding treatment plan.                Diagnosis 1:  LBP  Pain -  hot/cold therapy, manual therapy and directional preference exercise  Decreased ROM/flexibility - manual therapy and therapeutic exercise  Decreased strength - therapeutic exercise and therapeutic activities  Impaired muscle performance - neuro re-education  Decreased function - therapeutic activities  Impaired posture - neuro re-education    Therapy Evaluation Codes:   1) History comprised of:   Personal factors that impact the plan of care:      None.    Comorbidity factors that " impact the plan of care are:      Depression, High Blood Pressure, Migraines/Headaches, Overweight, DVT.     Medications impacting care: Anti-depressants, High blood pressure and Gabapenin.  2) Examination of Body Systems comprised of:   Body structures and functions that impact the plan of care:      Lumbar spine.   Activity limitations that impact the plan of care are:      Lifting, Squatting/kneeling, Working, Sleeping and Laying down.  3) Clinical presentation characteristics are:   Stable/Uncomplicated.  4) Decision-Making    Low complexity using standardized patient assessment instrument and/or measureable assessment of functional outcome.  Cumulative Therapy Evaluation is: Low complexity.    Previous and current functional limitations:  (See Goal Flow Sheet for this information)    Short term and Long term goals: (See Goal Flow Sheet for this information)     Communication ability:  Patient appears to be able to clearly communicate and understand verbal and written communication and follow directions correctly.  Treatment Explanation - The following has been discussed with the patient:   RX ordered/plan of care  Anticipated outcomes  Possible risks and side effects  This patient would benefit from PT intervention to resume normal activities.   Rehab potential is good.    Frequency:  1 X week, once daily  Duration:  for 6 weeks  Discharge Plan:  Achieve all LTG.  Independent in home treatment program.  Reach maximal therapeutic benefit.    Please refer to the daily flowsheet for treatment today, total treatment time and time spent performing 1:1 timed codes.

## 2022-05-12 NOTE — NURSING NOTE
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  Work status?  Full time.    Sports: n  Activities: walking             BP (!) 128/91   Pulse 98   Ht 1.689 m (5' 6.5\")   Wt 99.7 kg (219 lb 11.2 oz)   LMP 03/23/2022 (Approximate)   BMI 34.93 kg/m        Allergies   Allergen Reactions     Chantix [Varenicline Tartrate] Anxiety       Current Outpatient Medications   Medication     busPIRone (BUSPAR) 5 MG tablet     cyclobenzaprine (FLEXERIL) 10 MG tablet     gabapentin (NEURONTIN) 100 MG capsule     ibuprofen (ADVIL/MOTRIN) 800 MG tablet     losartan (COZAAR) 50 MG tablet     naproxen (NAPROSYN) 500 MG tablet     ondansetron (ZOFRAN ODT) 4 MG ODT tab     order for DME     order for DME     SUMAtriptan (IMITREX) 50 MG tablet     venlafaxine (EFFEXOR-XR) 150 MG 24 hr capsule     venlafaxine (EFFEXOR-XR) 75 MG 24 hr capsule     No current facility-administered medications for this visit.         Darla Severin-Brown, LPN    "

## 2022-05-12 NOTE — LETTER
"    5/12/2022         RE: Roxane Lopes  [address] [place] [id number]        Dear Colleague,    Thank you for referring your patient, Roxane Lopes, to the [facility] [place]. Please see a copy of my visit note below.        SUBJECTIVE:  HPI:  Roxane Lopes  Is a 47 year old female who presents today for new patient evaluation of acute right-sided low back pain upon referral from nurse practitioner Lucina Leslie whose 4/8/2022 office note documents the following history: \"Patient has notice pain and burning sensation radiating from her buttock to posterior knees that past 2 weeks. Worse when she is up standing and walking. Pain in the right lower back with certain movements but no back pain in between. Denies localized pain, warm, redness in the upper legs. Denies change in bowel or bladder, saddle anesthesia. She did have an abscess on her coccyx last year that was debrided and notes that she will still get occasional pains in her tailbone but nothing as bad as last year. Denies fever, chills, vomiting, diarrhea, drainage. \"    4/18/2022 lumbar MRI shows a central disc extrusion at L4-5 resulting in mild central stenosis moderate bilateral recess stenosis, mild displacement of bilateral L5 nerve roots.  L5-S1 also has a broad-based disc osteophyte complex mildly eccentric to the right neural foramen with moderate facet arthropathy, mild bilateral lateral recess stenosis, mild to moderate right and left neuroforaminal stenosis.    And he clarifies that 15 years ago she gave birth to her son who she \"carried low\" and she experienced some low back pain for the first time that.  Lasted for a while but eventually settled out and went away.  She has had a couple of episodes of mild low back pain a year since that time very similar to the current pain that she is having.  What is different about this pain is that it is lasting longer than usual but it is definitely " "improved over 2 months ago.  If she bends to the right she will rarely get a little bit of stretching her right thigh but it is more common if you to the left that she will feel it in the left lateral thigh, but there is no true leg pain, numbness, tingling, weakness, bowel or bladder dysfunction, or saddle anesthesia..  Gabapentin 100 mg at at bedtime really helps her after a long day at work when she is on her feet all day.  She just started PT today in [place].  Roxane Messer dictated history and said \"you are nailing it\".      SYMPTOMS WORSENED WITH bending    SYMPTOMS IMPROVED WITH stretching her back, gabapentin.  Ibuprofen does not help.    Pain score and diagram reviewed.  See questionnaire.      ROS: .  Otherwise negative for bowel/bladder dysfunction, balance changes, headache, leg pain/numbness/weakness, fevers, chills, night sweats, unexplained weight loss;  otherwise unremarkable.   See the patient's intake questionnaire from today for details.    Treatment to Date: Gabapentin, PT, ibuprofen    MEDICATIONS:  Reviewed.    ALLERGIES:  Reviewed.     Reviewed past medical, surgical, and family history.    Pertinent for PTSD, history of DVT (not currently anticoagulated), hypertension, obesity, GERD, anxiety, smoker    SOCIAL HX: She is  and has 4 children.  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  The patient is well nourished and well groomed.  She transitions easily and moves fluidly.  BMI is increased.  --PSYCHIATRIC:  Appropriate mood and affect. The patient is awake, alert, oriented to person, place, time and answering questions appropriately with clear speech.    --SKIN:  Skin over the face, bilateral lower extremities, and posterior torso is clean, dry, intact without rashes.  --RESPIRATORY: Normal respiratory excursion and effort, and no dyspnea.   --GAIT:  is non-antalgic. " Flat foot, heel and toe walking:  normal   .  Squat and rise   normal    .  --STANDING EXAMINATION:    Symmetry of spine/pelvis   unremarkable   .      Range of motion full with pain with flexion and the arm pain that comes is in the midline with extension at about L5.   Standing flexion   negative   .    Eric's sign   negative    .     Stork test   negative    .   --NEUROLOGICAL:     ROMBERG, TANDEM WALK, PRONATOR DRIFT:   Normal.   .  SENSATION to light touch is intact in bilateral thighs, lower legs and feet.   REFLEXES:  patellar 2+, and achilles absent.  Babinski is negative. No clonus.  MANUAL MOTOR TESTING:  L3- S1 Myotomes, Femoral, Obturator, Peroneal and Tibial nerves 5/5   DURAL STRETCH TESTS:  SLR negative.  Femoral Stretch Test not done.   --PELVIC/HIP JOINTS:                Long Sitting   negative   .    Hip scour   negative   .    Hip Impingement   negative   .   ADRIEN   negative    .     Piriformis   negative   .     Spring testing positive mostly at L5 moderately at both SI   joints negative above L5    PELVIC ALIGNMENT neutral.   --LUMBAR/GLUTEAL MUSCLES: No spasm, tenderness, trigger points.    --ABDOMINAL:  Non-distended.  Nontender  --VASCULAR:  Lower extremity capillary refill, temperature and color normal.  Bruises on her left lower extremity associated with some venous varicosities but with no trauma.  These have occurred sporadically for years.         IMAGING: Images and report reviewed    MR LUMBAR SPINE W/O CONTRAST 4/18/2022    L3-L4: Normal disc height with loss of disc signal. Broad-based posterior disc bulge. Mild facet arthropathy. No central spinal canal stenosis, though there is mild lateral recess stenosis bilaterally. No right neural foraminal stenosis. No left neural   foraminal stenosis.     L4-L5: Mild loss of disc height and signal. Broad-based posterior disc bulge with superimposed central disc extrusion measuring 4 mm AP x 6 mm CC with 6 mm caudal migration (sagittal T2  series 2, image #9). Mild facet arthropathy. Mild spinal canal stenosis. Moderate lateral recess stenosis bilaterally with abutment and mild displacement of the traversing L5 nerve roots. No right neural foraminal stenosis. No left neural foraminal stenosis.     L5-S1: Moderate loss of disc height and signal. Broad-based circumferential disc osteophyte complex, mildly eccentric to the right neural foramen. Moderate facet arthropathy. No central spinal canal stenosis, though there is mild lateral recess stenosis bilaterally. Mild to moderate right neural foraminal stenosis. Mild to moderate left neural foraminal stenosis.                                                                 CONCLUSION:  1.  Multilevel degenerative changes of the lumbar spine as detailed above,, greatest at L4-L5, where there is mild spinal canal stenosis and moderate lateral recess stenosis bilaterally with abutment and mild displacement of the traversing L5 nerve   roots.  2.  At L5-S1, there is mild lateral recess stenosis bilaterally and mild to moderate bilateral neural foraminal stenosis.    ASSESSMENT: Roxane Lopes is a 47 year old female who presents  today for new patient evaluation of:      Chronic intermittent midline nonradiating low back pain x15 years, with recent 2-month flare    Moderate L5 facet arthropathy which I think is the pain generator    No Pelvic Joint Dysfunction    Neurologically intact      PLAN:  Continue the physical therapy and maintain a home program that they teach her forever.  I think she can wean off the gabapentin once her pain settles down but she is going to have persistent recurrent flares of this because of the facet arthropathy and I explained this to her and showed her her images.  Thank you there is any reason to believe this is radiculopathy or neuropathic pain, and she is continuing with her weight loss and does not show any significant strength deficits so I do not think a MedX program will be  indicated.  In addition MedX bowels strengthening the back and lumbar extension which is the one painful range she has that might not be the best choice.  Her symptoms are nowhere near severe enough for me to recommend an RFA work-up which we did discuss and she agrees with me.  She can always do that later if she has persistent more severe pain.    I would like to thank Lucina Barkarine for the opportunity to participate in the care of this patient.    Advised patient to call or return early if symptoms worsen, or having problems controlling bladder and bowel function or worsening leg weakness.     Please note: Voice recognition software was used in this dictation.  It may therefore contain typographical errors.    Clovis García MD             Again, thank you for allowing me to participate in the care of your patient.        Sincerely,        Clovis García MD

## 2022-05-12 NOTE — PATIENT INSTRUCTIONS
Was very nice to meet you today me.  I think the pain is coming from some moderate arthritis in the lowest lumbar segment as I showed you and that is unlikely to be cured but we will manage it by avoiding a lot of lumbar extension and medicating as needed when your pain comes up with a flare.  If the pain settles down you can try going off the gabapentin.  Finish off therapy and let me know if I can be of further service.  For now we will need to plan a follow-up.  I refer you to the assessment and plan below for further details of our discussion today.    ASSESSMENT: Roxane Lopes is a 47 year old female who presents  today for new patient evaluation of:    Chronic intermittent midline nonradiating low back pain x15 years, with recent 2-month flare  Moderate L5 facet arthropathy which I think is the pain generator  No Pelvic Joint Dysfunction  Neurologically intact      PLAN:  Continue the physical therapy and maintain a home program that they teach her forever.  I think she can wean off the gabapentin once her pain settles down but she is going to have persistent recurrent flares of this because of the facet arthropathy and I explained this to her and showed her her images.  Thank you there is any reason to believe this is radiculopathy or neuropathic pain, and she is continuing with her weight loss and does not show any significant strength deficits so I do not think a MedX program will be indicated.  In addition MedX bowels strengthening the back and lumbar extension which is the one painful range she has that might not be the best choice.  Her symptoms are nowhere near severe enough for me to recommend an RFA work-up which we did discuss and she agrees with me.  She can always do that later if she has persistent more severe pain.

## 2022-05-17 ENCOUNTER — VIRTUAL VISIT (OUTPATIENT)
Dept: VASCULAR SURGERY | Facility: CLINIC | Age: 47
End: 2022-05-17
Attending: SPECIALIST
Payer: COMMERCIAL

## 2022-05-17 ENCOUNTER — TELEPHONE (OUTPATIENT)
Dept: VASCULAR SURGERY | Facility: CLINIC | Age: 47
End: 2022-05-17

## 2022-05-17 DIAGNOSIS — I83.812 VARICOSE VEINS OF LEFT LOWER EXTREMITY WITH PAIN: Primary | ICD-10-CM

## 2022-05-17 DIAGNOSIS — I83.892 VARICOSE VEINS OF LEG WITH SWELLING, LEFT: ICD-10-CM

## 2022-05-17 DIAGNOSIS — Z86.718 HISTORY OF DEEP VENOUS THROMBOSIS: ICD-10-CM

## 2022-05-17 PROCEDURE — 99213 OFFICE O/P EST LOW 20 MIN: CPT | Mod: 95 | Performed by: SPECIALIST

## 2022-05-17 NOTE — PROGRESS NOTES
Roxane is a 47 year old who is being evaluated via a billable video visit.      How would you like to obtain your AVS? MyChart  If the video visit is dropped, the invitation should be resent by: Text to cell phone: [phone]  Will anyone else be joining your video visit? No    Video Start Time: 8:48 AM    Video-Visit Details    Type of service:  Video Visit      F/U - US      Subjective:  Patient is a 47-year-old lady presenting with a 10-year history of left leg varicose veins.  She reports pain swelling and aching worse at the end of the day.  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  She does have a history of an unprovoked DVT about 10 years ago.  She was placed on Coumadin for 3 months but then stopped afterwards.  There does not be appear to be have been any further evaluation.  She has never seen a hematologist.  There is a family history of a DVT in her maternal grandmother as well.  She is not sure if she has had a hypercoagulable work-up.  She denies any leg trauma, nonhealing wounds or family history of varicose veins.  She has worn socks intermittently but complains of a rash from a prescription pair she received years ago      She had her US for which she now follows up.  She is waiting to receive her compression socks.  She is scheduled for her hematology visit in .      Objective:  Ext; Warm, right lower extremity: No edema, minimal scattered varicosities.  Left lower extremity: Multiple calf varicosities, +1 edema, several spots of psoriatic rash.  No stasis changes.      US -   Name:  Roxane Lopes                                                 Patient ID: [id number]  Date: May 12, 2022                                                    : 1975  Sex: female                                                                 Examined by: HOLLIS Buitrago RVT  Age:  47 year old                                                         Arnol MD:  Sydniecknadiya     INDICATION:  Patient is referred for varicose veins with pain and swelling.      EXAM TYPE  LEFT LOWER EXTREMITY VENOUS DUPLEX FOR VENOUS INSUFFICIENCY TECHNICAL SUMMARY     A duplex ultrasound study using color flow was performed, to evaluate the left lower extremity veins for valvular incompetence with the patient in a steep reversed trendelenberg.      LEFT:     The deep veins demonstrate phasic flow, compress and respond to augmentations.  There is no DVT.  The common femoral and popliteal veins are incompetent and free of thrombus. The remaining deep veins are competent and free of thrombus.      The GSV demonstrates phasic flow, compresses and responds to augmentations from the saphenofemoral junction to the ankle with no evidence of thrombus. The greater saphenous vein measures 7.6 mm at the saphenofemoral junction, 6.9 mm at the proximal thigh, and 5.2 mm at the knee. The GSV is incompetent from SFJ to Mid Calf , with the greatest reflux time of 6045 milliseconds.   The GSV gives rise to multiple incompetent varicose veins, the largest measures 8.3 mm off the Proximal Calf that courses Anteromedial with a reflux time of 4306 milliseconds.      The AASV is not visualized.      The Giacomini vein is competent ( 2.3 mm) communicating with the small saphenous vein at the knee level.     The SSV demonstrates phasic flow, compresses and responds to augmentations from the popliteal space to the ankle.  No thrombus is seen. The saphenopopliteal junction is absent. The SSV is incompetent at the Proximal Calf with a reflux time of 1204 milliseconds.  The SSV gives rise to a varicose branch measuring 3.8 mm off the Proximal Thigh that courses Anteromedial with a reflux time of 5081 milliseconds (which appears to be a GSV tributary).       Perforators: there is no evidence of incompetent  veins at any level.      RIGHT:     The CFV demonstrate phasic flow, compress and respond to augmentations.   There is no DVT.       FINAL SUMMARY:  1.         No evidence of left lower extremity deep vein thrombus.  2.         Left common femoral and popliteal vein incompetence.   3.         Left great saphenous vein incompetence.  4.         Left small saphenous vein incompetence.   5.         Left varicose vein incompetence.   6.         The time of incompetence is greater than 500 milliseconds in superficial and  veins and greater than 1000 milliseconds in deep veins.         Urbano Bowamn MD, FACS    Assessment/plan:  This is a 47 lady with symptomatic left leg varicose veins.  She has a CEAP 3 in the left leg and CEAP 2 right.  She also has a history of an unprovoked DVT in the left leg but there is no residual thrombus on the current ultrasound.  Her ultrasound did reveal some left leg deep system reflux along with extensive GSV reflux.  I did review the ultrasound findings with the patient she expressed understanding.  Based on the ultrasound she is a candidate for a left GSV RF ablation with medically necessary stab phlebectomy.  This would all be pending hematology input and her response to compression socks.  She will follow-up with me in 3 months.    Urbano Bowman MD, FACS            Video End Time:9:07 AM    Originating Location (pt. Location): Home    Distant Location (provider location):  [facility] [place]     Platform used for Video Visit: [employer]

## 2022-05-17 NOTE — LETTER
5/17/2022         RE: Roxane Lopes  [address] [place] [id number]        Dear Colleague,    Thank you for referring your patient, Roxane Lopes, to the [facility] [place]. Please see a copy of my visit note below.    Roxane is a 47 year old who is being evaluated via a billable video visit.      How would you like to obtain your AVS? MyChart  If the video visit is dropped, the invitation should be resent by: Text to cell phone: [phone]  Will anyone else be joining your video visit? No    Video Start Time: 8:48 AM    Video-Visit Details    Type of service:  Video Visit      F/U - US      Subjective:  Patient is a 47-year-old lady presenting with a 10-year history of left leg varicose veins.  She reports pain swelling and aching worse at the end of the day.  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  She does have a history of an unprovoked DVT about 10 years ago.  She was placed on Coumadin for 3 months but then stopped afterwards.  There does not be appear to be have been any further evaluation.  She has never seen a hematologist.  There is a family history of a DVT in her maternal grandmother as well.  She is not sure if she has had a hypercoagulable work-up.  She denies any leg trauma, nonhealing wounds or family history of varicose veins.  She has worn socks intermittently but complains of a rash from a prescription pair she received years ago      She had her US for which she now follows up.  She is waiting to receive her compression socks.  She is scheduled for her hematology visit in June.      Objective:  Ext; Warm, right lower extremity: No edema, minimal scattered varicosities.  Left lower extremity: Multiple calf varicosities, +1 edema, several spots of psoriatic rash.  No stasis changes.      US -   Name:  Roxane Lopes                                                 Patient ID: [id number]  Date: May 12, 2022                                                     : 1975  Sex: female                                                                 Examined by: HOLLIS Buitrago RVT  Age:  47 year old                                                         Reading MD: Colby     INDICATION:  Patient is referred for varicose veins with pain and swelling.      EXAM TYPE  LEFT LOWER EXTREMITY VENOUS DUPLEX FOR VENOUS INSUFFICIENCY TECHNICAL SUMMARY     A duplex ultrasound study using color flow was performed, to evaluate the left lower extremity veins for valvular incompetence with the patient in a steep reversed trendelenberg.      LEFT:     The deep veins demonstrate phasic flow, compress and respond to augmentations.  There is no DVT.  The common femoral and popliteal veins are incompetent and free of thrombus. The remaining deep veins are competent and free of thrombus.      The GSV demonstrates phasic flow, compresses and responds to augmentations from the saphenofemoral junction to the ankle with no evidence of thrombus. The greater saphenous vein measures 7.6 mm at the saphenofemoral junction, 6.9 mm at the proximal thigh, and 5.2 mm at the knee. The GSV is incompetent from SFJ to Mid Calf , with the greatest reflux time of 6045 milliseconds.   The GSV gives rise to multiple incompetent varicose veins, the largest measures 8.3 mm off the Proximal Calf that courses Anteromedial with a reflux time of 4306 milliseconds.      The AASV is not visualized.      The Giacomini vein is competent ( 2.3 mm) communicating with the small saphenous vein at the knee level.     The SSV demonstrates phasic flow, compresses and responds to augmentations from the popliteal space to the ankle.  No thrombus is seen. The saphenopopliteal junction is absent. The SSV is incompetent at the Proximal Calf with a reflux time of 1204 milliseconds.  The SSV gives rise to a varicose branch measuring 3.8 mm off the Proximal Thigh that courses  Anteromedial with a reflux time of 5081 milliseconds (which appears to be a GSV tributary).       Perforators: there is no evidence of incompetent  veins at any level.      RIGHT:     The CFV demonstrate phasic flow, compress and respond to augmentations.  There is no DVT.       FINAL SUMMARY:  1.         No evidence of left lower extremity deep vein thrombus.  2.         Left common femoral and popliteal vein incompetence.   3.         Left great saphenous vein incompetence.  4.         Left small saphenous vein incompetence.   5.         Left varicose vein incompetence.   6.         The time of incompetence is greater than 500 milliseconds in superficial and  veins and greater than 1000 milliseconds in deep veins.         Urbano Bowman MD, FACS    Assessment/plan:  This is a 47 lady with symptomatic left leg varicose veins.  She has a CEAP 3 in the left leg and CEAP 2 right.  She also has a history of an unprovoked DVT in the left leg but there is no residual thrombus on the current ultrasound.  Her ultrasound did reveal some left leg deep system reflux along with extensive GSV reflux.  I did review the ultrasound findings with the patient she expressed understanding.  Based on the ultrasound she is a candidate for a left GSV RF ablation with medically necessary stab phlebectomy.  This would all be pending hematology input and her response to compression socks.  She will follow-up with me in 3 months.    Urbano Bowman MD, FACS            Video End Time:9:07 AM    Originating Location (pt. Location): Home    Distant Location (provider location):  [facility] [place]     Platform used for Video Visit: Yessi      Again, thank you for allowing me to participate in the care of your patient.        Sincerely,        Urbano Bowman MD

## 2022-06-13 ENCOUNTER — TELEPHONE (OUTPATIENT)
Dept: FAMILY MEDICINE | Facility: CLINIC | Age: 47
End: 2022-06-13
Payer: COMMERCIAL

## 2022-06-13 DIAGNOSIS — F41.8 DEPRESSION WITH ANXIETY: ICD-10-CM

## 2022-06-13 RX ORDER — VENLAFAXINE HYDROCHLORIDE 75 MG/1
CAPSULE, EXTENDED RELEASE ORAL
Qty: 90 CAPSULE | Refills: 0 | Status: SHIPPED | OUTPATIENT
Start: 2022-06-13 | End: 2022-10-04

## 2022-06-13 RX ORDER — VENLAFAXINE HYDROCHLORIDE 150 MG/1
CAPSULE, EXTENDED RELEASE ORAL
Qty: 90 CAPSULE | Refills: 0 | Status: SHIPPED | OUTPATIENT
Start: 2022-06-13 | End: 2022-10-04

## 2022-06-13 NOTE — TELEPHONE ENCOUNTER
venlafaxine (EFFEXOR-XR) 75 MG 24 hr capsule    venlafaxine (EFFEXOR-XR) 150 MG 24 hr capsule    Patient is requesting two new Rx's  No refills remain for either, per [facility]     Please send RX's asap-patient is leaving for vacation

## 2022-06-14 DIAGNOSIS — M25.511 ACUTE PAIN OF RIGHT SHOULDER: ICD-10-CM

## 2022-06-14 DIAGNOSIS — M25.511 RIGHT SHOULDER PAIN, UNSPECIFIED CHRONICITY: ICD-10-CM

## 2022-06-15 NOTE — TELEPHONE ENCOUNTER
Pending Prescriptions:                       Disp   Refills    gabapentin (NEURONTIN) 100 MG capsule     30 cap*3            Sig: Take 1 capsule (100 mg) by mouth 3 times daily    Routing refill request to provider for review/approval because:  Drug not on the [facility] refill protocol   Last office visit 4/14/22.    Casi Wilder RN

## 2022-06-26 RX ORDER — GABAPENTIN 100 MG/1
100 CAPSULE ORAL 3 TIMES DAILY
Qty: 30 CAPSULE | Refills: 3 | Status: SHIPPED | OUTPATIENT
Start: 2022-06-26 | End: 2023-04-13

## 2022-07-06 NOTE — PROGRESS NOTES
[facility]  [facility]  [facility], [address], [place] [address]  Main: [phone], Fax: [phone]      Outpatient Visit Note:    Patient: Roxane Lopes  MRN: [id number]  : 1975  ASHLEE: 2022    History of Present Illness  Roxane Lopes is a 47 year old woman with a past medical history significant for obesity, hypertension, prediabetes, and unprovoked left lower extremity DVT in  (at age 36), s/p ~4 months of warfarin with no evidence of residual thrombus on recent lower extremity ultrasound. She does not recall how long she was recommended to stay on anticoagulation back in , but thinks she may have just stopped the warfarin with the Rx ran out. I do not have access to the imaging from , but Roxane reports that she thinks this was a distal DVT. She knows that it was unprovoked, and retrospectively continues to deny the following provoking factors: long distance travel, immobility, injury/surgery/trauma, exogenous estrogen exposure/pregnancy, active cancer/cancer treatments.    Roxane also has a long standing history of venous insufficiency and symptomatic left lower extremity varicosities. She does wear compression stockings with some relief, but is ultimately now pursing ablation via vascular surgery.    She is not currently on anticoagulation of any kind. She was referred to the [facility] given her personal history of unprovoked DVT in . Notably, she also has +family history of venous thromboembolism on the maternal side.    Aside from the event in , she has no previous or subsequent history of venous thromboembolism. However, she is understandably concerned about possible recurrence. She has been to the ED a few times with lower extremity pain since , but her ultrasounds have been negative for DVT.    ROS:  Denies LE pain/swelling. Denies chest pain. Denies shortness of breath.  "    Family History:    History of DVT in maternal grandmother, who was also apparently in her 30s when she had her first DVT.    Believes that her maternal great grandmother or great grandfather also had a DVT, though specifics on this are not known.    Paternal history is unknown.    No siblings.    Has 3 children--3 girls and 1 boy, none of them have a history of venous thromboembolism.    Objectives:  BP (!) 133/90   Pulse 97   Temp 98  F (36.7  C) (Oral)   Resp 16   Ht 1.689 m (5' 6.5\")   Wt 96.7 kg (213 lb 1.6 oz)   SpO2 99%   BMI 33.88 kg/m    Exam:   Constitutional: Appears well, no distress  Respiratory: Normal work of breathing.  Skin: no petechiae, no ecchymosis.  Neuro: AOx3      Assessment:  In summary, Roxane is a 47 year old woman a with history of unprovoked left lower extremity DVT in 2011 (reportedly distal but I do not have access to imaging studies) s/p ~4 months of warfarin with no evidence of residual thrombus on recent lower extremity ultrasound. She also has a long standing history of venous insufficiency and symptomatic left lower extremity varicosities and is pursuing ablation via vascular surgery.    We discussed that in general, the recommendation for unprovoked venous thromboembolism is indefinite anticoagulation therapy. However, we also discussed that distal lower extremity DVTs are generally lower risk. Patient is concerned about her risk for recurrence. So, we elected to proceed with indefinite prophylactic intensity anticoagulation with Xarelto 10mg daily. Spent some time educating her on DOACs.     We also spent some time discussing the pathophysiology of venous thromboembolism, provoking factors for venous thromboembolism, and congenital thrombophilias. Given personal and family history of venous thromboembolism, I did recommend congenital thrombophilia work-up today.    Ok for her to proceed with her ablation procedure. She should hold Xarelto 48h prior to the procedure, and " re-start 12-24h following the procedure. She should follow-up yearly.    The patient understands and agrees with the above recommendations. The patient was given our center's contact information and was instructed to call if any further questions/concerns arise.    60 minutes spent on the date of the encounter doing chart review, history and exam, documentation and further activities per the note.           Cheyanne Reed PA-C, SEBLE  [facility]  [address] [place] [id number]  Main: [phone], Fax: [phone]    ---------------------  7/12/22 ADDENDUM:    Congenital thrombophilia work-up is negative.    Factor V 1691G>A ([place])  RESULTS:  Mutation analyzed: 1691G>A  Factor V 1691G>A ([place])  Interpretation:  ABSENT  Factor V 1691G>A ([place]) mutation  genotype:  G/G     FACTOR 2/PROTHROMBIN RESULTS:  Mutation analyzed: [id number]  Factor 2 Mutation Interpretation:  ABSENT  Factor 2 Mutation genotype:  G/G    Component      Latest Ref Rng & Units 7/7/2022   Prot C Chromogenic      70 - 170 % 140   Protein S Antigen Free      55 - 125 % 115   Antithrombin III Chromogenic      85 - 135 % 116       Cheyanne Reed PA-C, SEBLE  [facility]  [address] [place] [address], [address] [address]  Main: [phone], Fax: [phone]

## 2022-07-07 ENCOUNTER — OFFICE VISIT (OUTPATIENT)
Dept: HEMATOLOGY | Facility: CLINIC | Age: 47
End: 2022-07-07
Attending: PHYSICIAN ASSISTANT
Payer: COMMERCIAL

## 2022-07-07 VITALS
WEIGHT: 213.1 LBS | OXYGEN SATURATION: 99 % | HEART RATE: 97 BPM | BODY MASS INDEX: 33.45 KG/M2 | DIASTOLIC BLOOD PRESSURE: 90 MMHG | RESPIRATION RATE: 16 BRPM | TEMPERATURE: 98 F | HEIGHT: 67 IN | SYSTOLIC BLOOD PRESSURE: 133 MMHG

## 2022-07-07 DIAGNOSIS — Z86.718 HISTORY OF DEEP VENOUS THROMBOSIS: ICD-10-CM

## 2022-07-07 LAB
FACTOR 2 INTERPRETATION: NORMAL
FACTOR V INTERPRETATION: NORMAL
LAB DIRECTOR COMMENTS: NORMAL
LAB DIRECTOR DISCLAIMER: NORMAL
LAB DIRECTOR INTERPRETATION: NORMAL
LAB DIRECTOR METHODOLOGY: NORMAL
LAB DIRECTOR RESULTS: NORMAL
SPECIMEN DESCRIPTION: NORMAL

## 2022-07-07 PROCEDURE — G0463 HOSPITAL OUTPT CLINIC VISIT: HCPCS

## 2022-07-07 PROCEDURE — 85303 CLOT INHIBIT PROT C ACTIVITY: CPT | Performed by: PHYSICIAN ASSISTANT

## 2022-07-07 PROCEDURE — 36415 COLL VENOUS BLD VENIPUNCTURE: CPT | Performed by: PHYSICIAN ASSISTANT

## 2022-07-07 PROCEDURE — 85300 ANTITHROMBIN III ACTIVITY: CPT | Performed by: PHYSICIAN ASSISTANT

## 2022-07-07 PROCEDURE — 81240 F2 GENE: CPT | Performed by: PHYSICIAN ASSISTANT

## 2022-07-07 PROCEDURE — 99215 OFFICE O/P EST HI 40 MIN: CPT | Performed by: PHYSICIAN ASSISTANT

## 2022-07-07 PROCEDURE — G0452 MOLECULAR PATHOLOGY INTERPR: HCPCS | Mod: 26 | Performed by: PATHOLOGY

## 2022-07-07 PROCEDURE — 85306 CLOT INHIBIT PROT S FREE: CPT | Performed by: PHYSICIAN ASSISTANT

## 2022-07-07 ASSESSMENT — PAIN SCALES - GENERAL: PAINLEVEL: NO PAIN (0)

## 2022-07-07 NOTE — PATIENT INSTRUCTIONS
[facility]  Center for [facility]ot[facility]  [facility], [address], [place] [address]  Main: [phone], Fax: [phone]          Plan:  -Xarelto 10mg daily with food as venous thromboembolism prophylaxis. Rx sent to our pharmacy. They will call you to arrange mailing.    -Prior to ablation procedure--hold Xarelto for 48 hours prior and re-start 12-24 hours after.     -I will reach out to you when I see your lab results.    -Follow-up yearly with us or your primary to check in on the anticoagulation.

## 2022-07-09 LAB
AT III ACT/NOR PPP CHRO: 116 % (ref 85–135)
PROT C ACT/NOR PPP CHRO: 140 % (ref 70–170)
PROT S FREE AG ACT/NOR PPP IA: 115 % (ref 55–125)

## 2022-07-12 PROBLEM — M54.42 ACUTE RIGHT-SIDED LOW BACK PAIN WITH BILATERAL SCIATICA: Status: RESOLVED | Noted: 2022-05-12 | Resolved: 2022-07-12

## 2022-07-12 PROBLEM — M54.41 ACUTE RIGHT-SIDED LOW BACK PAIN WITH BILATERAL SCIATICA: Status: RESOLVED | Noted: 2022-05-12 | Resolved: 2022-07-12

## 2022-08-23 ENCOUNTER — VIRTUAL VISIT (OUTPATIENT)
Dept: VASCULAR SURGERY | Facility: CLINIC | Age: 47
End: 2022-08-23
Payer: COMMERCIAL

## 2022-08-23 DIAGNOSIS — I83.892 VARICOSE VEINS OF LEG WITH SWELLING, LEFT: ICD-10-CM

## 2022-08-23 DIAGNOSIS — Z86.718 HISTORY OF DEEP VENOUS THROMBOSIS: ICD-10-CM

## 2022-08-23 DIAGNOSIS — I83.812 VARICOSE VEINS OF LEFT LOWER EXTREMITY WITH PAIN: Primary | ICD-10-CM

## 2022-08-23 PROCEDURE — 99213 OFFICE O/P EST LOW 20 MIN: CPT | Mod: 95 | Performed by: SPECIALIST

## 2022-08-23 NOTE — PROGRESS NOTES
August 23, 2022    Vein Procedure Recommendation    Called and spoke with patient.    Dr. Bowman has recommended patient to have the following vein procedure(s):     1. Left leg VNUS closure GSV and Phlebectomies (medically necessary)    Patient Pre-op Questions:  Preferred Pharmacy: [facility] in [place] on [facility].  Anticoagulant/ASA: Xarelto daily  Artificial Joint or Heart Valve: no  Open ulcer: no  Sedation nausea: no    Patient is recommended to wear Thigh High compression hose following her procedure. Discussed compression hose. Explained to patient if insurance doesn't cover the compression hose there are a couple different options to getting them and to call the clinic to let us know if they need help.     written procedure instructions to review on her own (see After Visit Summary), via [employer].      Next steps:    Insurance Submission  Informed patient this process could take up to 14 business days, but once approved, the patient will be contacted by our surgery scheduler to schedule the above procedure. Gave patient our surgery scheduler's information.    Patient is in agreement with all of the above and has no further questions at this time.    Tamera Oneill RN  [facility]  Vein Clinic

## 2022-08-23 NOTE — PATIENT INSTRUCTIONS
Pre-Procedure Instructions:                           VNUS Closure and Phlebectomies  You are having a Closure(s) - where one or more veins are closed and Phlebectomies - where one or more veins are removed.    Insurance  Precertification and/or referral authorization may be required by your insurance company.  We will call your insurance company to verify benefits for the medically necessary part of your procedure.    Your Current Medications and Allergies  To reduce bruising, please do not take aspirin-type medications (Motrin, Aleve, Ibuprofen, Advil, etc.) for three days before your procedure. You may take Tylenol if you need a pain reliever.  Are you on blood thinner medications? (Plavix, Coumadin, Eliquis, Xarelto) Please discuss this with your surgeon. You may resume taking your blood thinner medication after your procedure.  Are you sensitive to latex or adhesives used for fake fingernails? Please let us know!    Driving Escort and   Please arrange to have a trusted adult (18 years old or older) drive you to and from the clinic.  For your safety, we recommend you have a trusted adult to stay with you until the next morning.    Your Health  If you have a change in your health before the procedure, contact our office immediately.   (For example: cold symptoms, cough, urinary tract infection, fever, flu symptoms).  A pre-procedure physical is not required.    Note  It is sometimes necessary to adjust the procedure schedule due to emergencies. We greatly appreciate your flexibility and understanding in this matter.  Compression hose are needed following this procedure.                   Check List: The Morning of Your Procedure  ___1. Please do not put anything on your leg(s) or shave the day of your procedure.  ___2. You may take your normal medications the day of your procedure.  ___3. It is recommended you eat a light breakfast or lunch the day of your procedure.  ___4. Wear comfortable  loose-fitting clothing and wide-fitting shoes (i.e. tennis shoes, slip-ons).  ___5. Please arrive at our clinic at the specified time given by the nurse.  ___6. You will sign an affirmation of informed consent.  ___7. Bring your pre-procedure sedation medication (lorazepam and clonidine) with you to the clinic. One hour              before your procedure, you will be instructed to take these medications. The lorazepam (Ativan) lowers              anxiety and sedates you; the clonidine makes the lorazepam more effective. Everyone's body processes              these medications differently. Therefore, reactions to these medications vary. Some people stay awake and              some people sleep through the whole procedure. You may not remember everything about the procedure              or the day. You do not want to make any big decisions for the rest of the day.     The Day of Your Procedure:       VNUS Closure and Phlebectomies  In the Exam Room  A nurse will bring you back to an exam room with your family member or friend. This is when your informed consent will be signed, and you will take your pre-procedure medications.  You will be asked to remove everything from the waist down, including undergarments. You will then put on a hospital gown or shorts and blue booties.  Your surgeon will come in to answer any questions and stoney any bulging varicose veins to be removed.  You will be taken to the restroom to empty your bladder before going into the procedure room.    In the Procedure Room  You will be escorted to the procedure room. You will lie on a procedure table covered with a sheet or blanket.  A nurse will put a blood pressure cuff on your arm and a pulse/oxygen monitor on a finger. Your vital signs will be monitored every 15 minutes.  Your gown will be pulled up slightly and the groin exposed for a short period of time. The surgeon's assistant will clean your foot, leg, and groin with an antibacterial  solution. We will get you covered up as quickly as possible!  Sterile towels and blue drapes will be used to cover you and the table. You will be asked to keep your hands under the blue drapes during the procedure.  The lights will be turned down. The table will be tipped so your head is higher than your feet. You may feel like you're going to slide off, but you won't.    The Procedure  The surgeon will visualize your veins with an ultrasound machine. He or she will then numb your skin and access the vein. A catheter is passed up the vein and positioned with ultrasound guidance. The table will then be tipped head down.  Once the catheter is in the correct position, medication will be injected to numb your leg. You will feel some needle sticks and may feel discomfort as the medication goes in. Once this is done, you should not experience significant discomfort. But if you do, please let us know and more numbing medication can be injected. As the catheter sends out heat, the vein closes off and the catheter is withdrawn.  For the phlebectomy part of the procedure, small incisions are made where the bulging varicose veins have been marked on your leg(s) and these veins will be removed using a small tierney hook instrument.                    VNUS closure                  Phlebectomy    Post-Procedure  Once the procedure is done, your leg(s) will be washed with warm water and dried. Your leg(s) will be bandaged with large soft dressings and a large ace bandage wrapped from toes to groin.   You will be offered something to drink and a light snack.  You will rest with your leg(s) elevated for approximately 30 minutes. Your friend or family member may join you.  For your safety, you will be taken to your car in a wheelchair. If you are able to, it is good to keep your leg(s) elevated on the car ride home.    Post-Procedure Instructions:             VNUS Closure and Phlebectomies    Post-Op Day Zero - The Day of Your  Procedure:0  1. Medication for Pain Control and Inflammation Control   - The numbing medication injected during your procedure will last for several hours. The pre-procedure                 tablets may make you very sleepy and you might not remember everything from the procedure or from                 the day. This will usually wear off by the next day.   - Ibuprofen:  If tolerated, take ibuprofen (e.g., Advil) to reduce inflammation whether or not you have                 pain. For three days, take two tablets (200 mg each) with every meal and at bedtime with a snack. If                 your pain is not controlled with ibuprofen, you may take prescription pain medication (such as Norco),                 if prescribed.   - You may resume taking any medications you were taking before your procedure.  2. Activity   - Rest with your leg(s) elevated above your heart. This will prevent swelling and bleeding.                 You do not need to elevate your leg(s) while sleeping at night. You may go upstairs, sit up to                 eat, use the bathroom, and take several five-minute walks. Otherwise, keep your leg(s) elevated.                 Minimize the amount of time you are up on your feet to about 30 minutes at a time.  3. Bandages   - The incision sites will be covered with soft bandages and an ACE wrap. Keep your bandages on and       dry for 48 hours. The ACE should provide  snug  compression but should not cause pain or numbness       in the toes. If you have significant discomfort or your toes become cold or numb, unwrap your ACE and       rewrap with less tension starting at the toes wrapping upward.  4. Incisions   - Bleeding: You may see some incision sites that are oozing through the bandages. This is not unusual       and can be managed with Rest, Ice, Compression and Elevation (RICE). Apply ice and firm pressure       directly to the site that is bleeding and rest with your leg(s) elevated above your  heart for 20-30 minutes.    Post-Op Day One:  1. Medication   - Ibuprofen: Continue the same as the Day of Your Procedure. If your pain is not controlled with       ibuprofen, you may take prescription pain medication (such as Norco), if prescribed.   2. Activity   - We would like you to get up at least six times and walk around for short periods of time, unless it is       causing you pain. You should not be on your feet more than 90 minutes at a time. Elevate your leg       above your heart when you are not walking.  3. Bandages   - Your bandages must be kept on and dry for 48 hours.  4. Driving   - You may resume driving when you can do so safely. Do not drive if you are taking narcotic pain       medication.    Post-Op Day Two:   1. Medication  - Ibuprofen: Continue the same as the Day of Your Procedure.  2.  Activity  - Walk as tolerated. Elevate as much as possible when not walking.  3. Bandages and Compression  - Remove ACE wrap and padding. Shower and put on your compression hose during waking hours only for at least five days.    (Your doctor may instruct you to keep your bandages on until your return appointment; please follow your doctor's instructions.)  4. Incisions  - Your leg(s) will be bruised; there may be swelling, hard knots under the skin and possibly some numbness. These will likely resolve over time.   If you see  hair-like  strings coming out of your incisions, do not pull them (this will only cause pain/discomfort). We will trim them when you come back for your follow-up appointment.  5. Call Us If:   - You see any areas on your leg that are red and angry in appearance.   - You notice any drainage that is milky or cloudy in appearance or has a foul odor.   - You run a temperature of 100.5 or greater.    Post-Op Day Three:  You will have a follow up appointment 2-4 days post-procedure. At this appointment, you will have an ultrasound and we will check your incisions.     __________________________________________________________________________________________    The Two Weeks Following Your Procedure  1.  Skin Care   - Do not use any lotions, creams or powders on your incision(s) for 14 days or until the incisions have               healed.   - Do not soak in a bathtub, hot tub or go swimming for 14 days or until your incisions have healed.  2.  Medications   - You may use ibuprofen or acetaminophen (e.g., Tylenol) as needed for pain or discomfort.  3.  Activity   - Do not lift over 25 pounds. After about two weeks you may resume exercise such as aerobics, running,               tennis or weightlifting. Use your common sense and ease back into your exercise routine slowly.   - You may feel a cord-like tightness along the inside of your leg. Gentle stretching can be helpful.  4. Compression Hose   - Your doctor may instruct you to wear compression for longer than seven days; please    follow your doctor's instructions. As a comfort measure, you may choose to wear compression for    longer than required.  5.  Travel   - Do not fly in an airplane for 14 days after your procedure. If you have a long car trip planned within    two to three weeks following your procedure, stop and walk for a few minutes every two hours.    Periodic ankle pumps during the ride may be helpful.    Six Week Appointment  - At your six-week appointment, you will see your surgeon for an exam and evaluation. This office visit   will be scheduled when you return for post-op day three return appointment.     Return to Work  1.  If you work outside the home, you may return to work in a few days depending on the extent of your        procedure, how you tolerate it, and the type of work you perform.  2.  Paperwork: If your employer requires paperwork or you would like a letter written to your employer, please        let us know. We will complete disability type forms at no charge. Please allow five business days  for forms        to be completed.      [employer] last reviewed this educational content on 11/1/2019 (RFA photo), 12/1/2019 (Phlebectomy photo)    4910-1515 [employer]. All rights reserved. This information is not intended as a substitute for professional medical care. Always follow your healthcare professional's instructions.

## 2022-08-23 NOTE — LETTER
8/23/2022         RE: Roxane Lopes  [address] [place] [id number]        Dear Colleague,    Thank you for referring your patient, Roxane Lopes, to the [facility] [place]. Please see a copy of my visit note below.    Roxane is a 47 year old who is being evaluated via a billable video visit.      How would you like to obtain your AVS? MyChart  If the video visit is dropped, the invitation should be resent by: Text to cell phone: [phone]  Will anyone else be joining your video visit? No          Video-Visit Details    Video Start Time: 10:23 AM    Type of service:  Video Visit      F/U -conservative treatment      Subjective:  Patient is a 47-year-old lady presenting with a 10-year history of left leg varicose veins.  She reports pain swelling and aching worse at the end of the day.  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  She does have a history of an unprovoked DVT about 10 years ago.  She was placed on Coumadin for 3 months but then stopped afterwards.  There does not be appear to be have been any further evaluation.  She has never seen a hematologist.  There is a family history of a DVT in her maternal grandmother as well.  She is not sure if she has had a hypercoagulable work-up.  She denies any leg trauma, nonhealing wounds or family history of varicose veins.  She has worn socks intermittently but complains of a rash from a prescription pair she received years ago      She has been wearing compression for 3 months.  She saw hematology who put her on Xarelto.  They did recommending stopping the Xarelto 48 hours prior to her procedure.  She continues to have symptoms despite compression and therapy and she is thinking she like something done.      Objective:  Ext; Warm, right lower extremity: No edema, minimal scattered varicosities.  Left lower extremity: Multiple calf varicosities, +1 edema, several spots of psoriatic rash.  No  stasis changes.      US -   Name:  Roxane Lopes                                                 Patient ID: [id number]  Date: May 12, 2022                                                    : 1975  Sex: female                                                                 Examined by: HOLLIS Buitrago RVT  Age:  47 year old                                                         Reading MD: Colby     INDICATION:  Patient is referred for varicose veins with pain and swelling.      EXAM TYPE  LEFT LOWER EXTREMITY VENOUS DUPLEX FOR VENOUS INSUFFICIENCY TECHNICAL SUMMARY     A duplex ultrasound study using color flow was performed, to evaluate the left lower extremity veins for valvular incompetence with the patient in a steep reversed trendelenberg.      LEFT:     The deep veins demonstrate phasic flow, compress and respond to augmentations.  There is no DVT.  The common femoral and popliteal veins are incompetent and free of thrombus. The remaining deep veins are competent and free of thrombus.      The GSV demonstrates phasic flow, compresses and responds to augmentations from the saphenofemoral junction to the ankle with no evidence of thrombus. The greater saphenous vein measures 7.6 mm at the saphenofemoral junction, 6.9 mm at the proximal thigh, and 5.2 mm at the knee. The GSV is incompetent from SFJ to Mid Calf , with the greatest reflux time of 6045 milliseconds.   The GSV gives rise to multiple incompetent varicose veins, the largest measures 8.3 mm off the Proximal Calf that courses Anteromedial with a reflux time of 4306 milliseconds.      The AASV is not visualized.      The Giacomini vein is competent ( 2.3 mm) communicating with the small saphenous vein at the knee level.     The SSV demonstrates phasic flow, compresses and responds to augmentations from the popliteal space to the ankle.  No thrombus is seen. The saphenopopliteal junction is absent. The SSV is incompetent at the Proximal Calf with a  reflux time of 1204 milliseconds.  The SSV gives rise to a varicose branch measuring 3.8 mm off the Proximal Thigh that courses Anteromedial with a reflux time of 5081 milliseconds (which appears to be a GSV tributary).       Perforators: there is no evidence of incompetent  veins at any level.      RIGHT:     The CFV demonstrate phasic flow, compress and respond to augmentations.  There is no DVT.       FINAL SUMMARY:  1.         No evidence of left lower extremity deep vein thrombus.  2.         Left common femoral and popliteal vein incompetence.   3.         Left great saphenous vein incompetence.  4.         Left small saphenous vein incompetence.   5.         Left varicose vein incompetence.   6.         The time of incompetence is greater than 500 milliseconds in superficial and  veins and greater than 1000 milliseconds in deep veins.         Urbano Bowman MD, FACS    Assessment/plan:  This is a 47 lady with symptomatic left leg varicose veins.  She has a CEAP 3 in the left leg and CEAP 2 right.  She also has a history of an unprovoked DVT in the left leg but there is no residual thrombus on the current ultrasound.  Her ultrasound did reveal some left leg deep system reflux along with extensive GSV reflux.  I did review the ultrasound findings with the patient she expressed understanding.  She did see hematology who recommended lifelong Xarelto.  That can be stopped for venous procedure.  She continues to have symptoms despite compression therapy.     Based on the ultrasound she is a candidate for a left GSV RF ablation with medically necessary stab phlebectomy.   The procedure, risks, benefits, and alternatives were discussed and the patient agrees to proceed.  She will be scheduled once insurance approval is obtained.    Urbano Bowman MD, FACS            Video End Time:9:07 AM    Originating Location (pt. Location): Home    Distant Location (provider location):  [facility] [place]     Platform used for Video Visit: [employer]    Video End Time:10:32 AM    Originating Location (pt. Location): Home    Distant Location (provider location):  [facility] [place]     Platform used for Video Visit: [employer]          Again, thank you for allowing me to participate in the care of your patient.        Sincerely,        Urbano Bowman MD

## 2022-08-23 NOTE — PROGRESS NOTES
Roxane is a 47 year old who is being evaluated via a billable video visit.      How would you like to obtain your AVS? MyChart  If the video visit is dropped, the invitation should be resent by: Text to cell phone: [phone]  Will anyone else be joining your video visit? No          Video-Visit Details    Video Start Time: 10:23 AM    Type of service:  Video Visit      F/U -conservative treatment      Subjective:  Patient is a 47-year-old lady presenting with a 10-year history of left leg varicose veins.  She reports pain swelling and aching worse at the end of the day.  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  She does have a history of an unprovoked DVT about 10 years ago.  She was placed on Coumadin for 3 months but then stopped afterwards.  There does not be appear to be have been any further evaluation.  She has never seen a hematologist.  There is a family history of a DVT in her maternal grandmother as well.  She is not sure if she has had a hypercoagulable work-up.  She denies any leg trauma, nonhealing wounds or family history of varicose veins.  She has worn socks intermittently but complains of a rash from a prescription pair she received years ago      She has been wearing compression for 3 months.  She saw hematology who put her on Xarelto.  They did recommending stopping the Xarelto 48 hours prior to her procedure.  She continues to have symptoms despite compression and therapy and she is thinking she like something done.      Objective:  Ext; Warm, right lower extremity: No edema, minimal scattered varicosities.  Left lower extremity: Multiple calf varicosities, +1 edema, several spots of psoriatic rash.  No stasis changes.      US -   Name:  Roxane Lopes                                                 Patient ID: [id number]  Date: May 12, 2022                                                    : 1975  Sex:  female                                                                 Examined by: HOLLIS Buitrago RVT  Age:  47 year old                                                         Reading MD: Colby     INDICATION:  Patient is referred for varicose veins with pain and swelling.      EXAM TYPE  LEFT LOWER EXTREMITY VENOUS DUPLEX FOR VENOUS INSUFFICIENCY TECHNICAL SUMMARY     A duplex ultrasound study using color flow was performed, to evaluate the left lower extremity veins for valvular incompetence with the patient in a steep reversed trendelenberg.      LEFT:     The deep veins demonstrate phasic flow, compress and respond to augmentations.  There is no DVT.  The common femoral and popliteal veins are incompetent and free of thrombus. The remaining deep veins are competent and free of thrombus.      The GSV demonstrates phasic flow, compresses and responds to augmentations from the saphenofemoral junction to the ankle with no evidence of thrombus. The greater saphenous vein measures 7.6 mm at the saphenofemoral junction, 6.9 mm at the proximal thigh, and 5.2 mm at the knee. The GSV is incompetent from SFJ to Mid Calf , with the greatest reflux time of 6045 milliseconds.   The GSV gives rise to multiple incompetent varicose veins, the largest measures 8.3 mm off the Proximal Calf that courses Anteromedial with a reflux time of 4306 milliseconds.      The AASV is not visualized.      The Giacomini vein is competent ( 2.3 mm) communicating with the small saphenous vein at the knee level.     The SSV demonstrates phasic flow, compresses and responds to augmentations from the popliteal space to the ankle.  No thrombus is seen. The saphenopopliteal junction is absent. The SSV is incompetent at the Proximal Calf with a reflux time of 1204 milliseconds.  The SSV gives rise to a varicose branch measuring 3.8 mm off the Proximal Thigh that courses Anteromedial with a reflux time of 5081 milliseconds (which appears to be a GSV  tributary).       Perforators: there is no evidence of incompetent  veins at any level.      RIGHT:     The CFV demonstrate phasic flow, compress and respond to augmentations.  There is no DVT.       FINAL SUMMARY:  1.         No evidence of left lower extremity deep vein thrombus.  2.         Left common femoral and popliteal vein incompetence.   3.         Left great saphenous vein incompetence.  4.         Left small saphenous vein incompetence.   5.         Left varicose vein incompetence.   6.         The time of incompetence is greater than 500 milliseconds in superficial and  veins and greater than 1000 milliseconds in deep veins.         Urbano Bowman MD, FACS    Assessment/plan:  This is a 47 lady with symptomatic left leg varicose veins.  She has a CEAP 3 in the left leg and CEAP 2 right.  She also has a history of an unprovoked DVT in the left leg but there is no residual thrombus on the current ultrasound.  Her ultrasound did reveal some left leg deep system reflux along with extensive GSV reflux.  I did review the ultrasound findings with the patient she expressed understanding.  She did see hematology who recommended lifelong Xarelto.  That can be stopped for venous procedure.  She continues to have symptoms despite compression therapy.     Based on the ultrasound she is a candidate for a left GSV RF ablation with medically necessary stab phlebectomy.   The procedure, risks, benefits, and alternatives were discussed and the patient agrees to proceed.  She will be scheduled once insurance approval is obtained.    Urbano Bowman MD, FACS            Video End Time:9:07 AM    Originating Location (pt. Location): Home    Distant Location (provider location):  [facility] [place]     Platform used for Video Visit: [facility]    Video End Time:10:32 AM    Originating Location (pt. Location): Home    Distant Location (provider location):  [facility]  [facility]     Platform used for Video Visit: [employer]ess[employer]

## 2022-09-13 ENCOUNTER — TELEPHONE (OUTPATIENT)
Dept: VASCULAR SURGERY | Facility: CLINIC | Age: 47
End: 2022-09-13

## 2022-09-13 DIAGNOSIS — I83.812 VARICOSE VEINS OF LEFT LOWER EXTREMITY WITH PAIN: Primary | ICD-10-CM

## 2022-09-13 NOTE — TELEPHONE ENCOUNTER
Compression Hose Order  Pt would like 1 pair(s) of black, closed-toe, thigh high, 20-30mmhg compression hose.    Please fax patient's Rx to the [facility] store.    Patient has procedure on 11/9/22 with Dr. Bowman.    Tamera Dickerson  [facility]  Vein Clinic

## 2022-09-13 NOTE — TELEPHONE ENCOUNTER
After Visit Follow Up  Per pt request, [facility] [phone].   Pt would like one pair(s) of black, closed-toe, thigh high, 20-30mmhg compression hose. Fax confirmed.    Pt aware they will be shipped to their home address.    Tamera Oneill RN

## 2022-09-27 PROBLEM — M79.621 PAIN OF RIGHT UPPER ARM: Status: RESOLVED | Noted: 2022-05-05 | Resolved: 2022-09-27

## 2022-09-27 PROBLEM — M19.111 POST-TRAUMATIC OSTEOARTHRITIS OF RIGHT SHOULDER: Status: RESOLVED | Noted: 2022-05-05 | Resolved: 2022-09-27

## 2022-09-27 PROBLEM — M54.2 NECK PAIN: Status: RESOLVED | Noted: 2022-05-05 | Resolved: 2022-09-27

## 2022-10-04 DIAGNOSIS — F41.8 DEPRESSION WITH ANXIETY: ICD-10-CM

## 2022-10-04 RX ORDER — VENLAFAXINE HYDROCHLORIDE 75 MG/1
CAPSULE, EXTENDED RELEASE ORAL
Qty: 30 CAPSULE | Refills: 0 | Status: SHIPPED | OUTPATIENT
Start: 2022-10-04 | End: 2022-12-08

## 2022-10-04 RX ORDER — VENLAFAXINE HYDROCHLORIDE 150 MG/1
CAPSULE, EXTENDED RELEASE ORAL
Qty: 30 CAPSULE | Refills: 0 | Status: SHIPPED | OUTPATIENT
Start: 2022-10-04 | End: 2022-12-08

## 2022-10-04 NOTE — TELEPHONE ENCOUNTER
30 day refill sent. Due for virtual visit for refills, please call her and help her set this up.  Thank you,  MEENAKSHI Ureña, NP-C  [facility]

## 2022-11-02 ENCOUNTER — TELEPHONE (OUTPATIENT)
Dept: VASCULAR SURGERY | Facility: CLINIC | Age: 47
End: 2022-11-02

## 2022-11-02 DIAGNOSIS — I83.893 SYMPTOMATIC VARICOSE VEINS OF BOTH LOWER EXTREMITIES: Primary | ICD-10-CM

## 2022-11-02 RX ORDER — CLONIDINE HYDROCHLORIDE 0.1 MG/1
TABLET ORAL
Qty: 1 TABLET | Refills: 0 | Status: SHIPPED | OUTPATIENT
Start: 2022-11-02 | End: 2022-11-25

## 2022-11-02 RX ORDER — LORAZEPAM 1 MG/1
TABLET ORAL
Qty: 3 TABLET | Refills: 0 | Status: SHIPPED | OUTPATIENT
Start: 2022-11-02 | End: 2022-11-25

## 2022-11-02 NOTE — TELEPHONE ENCOUNTER
Spoke with patient. Patient confirmed she has her thigh high compression hose. Verbalized she will stop taking her Xarelto after saturdays dose. No further questions or concerns at this time.

## 2022-11-02 NOTE — TELEPHONE ENCOUNTER
11/2/2022    Vein Clinic Preoperative Nurse Call    Procedure: Left leg VNUS closure GSV(med nec), 20-30 stab phlebs(med nec)  Date: 11/9/2022  Surgeon: Dr. Bowman  Time: 1300  Check in time: 1200    Called patient and left a detailed message. Informed patient: when to check in (1200) to sign consent, to bring their preop medications in their original bottle with them (3mg ativan, 0.1mg clonidine). Patient will take the medications after signing the consent to the procedure. Instructed patient to wear a mask, wear loose-fitting comfortable clothing, and bring their compression hose. Ensured patient has a /someone that will be responsible for them the rest of the day. Visitors are allowed in the clinic, but they would need to stay in an exam room or wait in the parking lot or leave. If  does leave, IF POSSIBLE, we ask that they do not go more than 15-20 mins from our clinic. Once procedure is completed, we will keep patient in recovery for 30-45 mins, and call  with aftercare instructions. Informed patient, that if possible, they should sit in the backseat to elevate their leg on the ride home.    Pt needs Thigh High compression hose for procedure. Status of the hose: patient instructed to call the clinic and update whether she has received her hose.    Special instructions: Patient takes Xarelto daily. Instructed patient to hold 3 days prior. Last day to take Sat 11/5/22.     Special COVID-19 instructions: Patient aware they need to wear a mask to our clinic and during their procedure. Patient aware their  can come in with them, but would need to stay in an exam room during the procedure or wait in the parking lot or leave. Nurse will call patient's  when procedure is over.    Patient understands if they have any of the following symptoms (fever, cough, shortness of breath, rash), they need to notify us immediately to cancel their procedure and will have to reschedule for a later  date.    Gave patient our call back number if any further questions or concerns.    Tamera Oneill RN  [facility]

## 2022-11-21 ENCOUNTER — TELEPHONE (OUTPATIENT)
Dept: VASCULAR SURGERY | Facility: CLINIC | Age: 47
End: 2022-11-21

## 2022-11-21 NOTE — TELEPHONE ENCOUNTER
Pt returning a call she missed regarding her medication. She has stopped taking her Xarelto. She also has the prescription needed for the procedure.     Thank you.

## 2022-11-22 NOTE — PATIENT INSTRUCTIONS
Post-Procedure Instructions:             VNUS Closure and Phlebectomies      Post-Op Day Zero - The Day of Your Procedure:0  1. Medication for Pain Control and Inflammation Control   - The numbing medication injected during your procedure will last for several hours. The pre-procedure                 tablets may make you very sleepy and you might not remember everything from the procedure or from                 the day. This will usually wear off by the next day.   - Ibuprofen:  If tolerated, take ibuprofen (e.g., Advil) to reduce inflammation whether or not you have                 pain. For three days, take two tablets (200mg each) with every meal and at bedtime with a snack. If                 your pain is not controlled with ibuprofen, you may take prescription pain medication (such as Norco),                 if prescribed.   - You may resume taking any medications you were taking before your procedure.  2. Activity   - Rest with your leg(s) elevated above your heart. This will prevent from a lot of swelling and                 bleeding. You do not need to elevate your leg(s) while sleeping at night. You may go upstairs, sit up to                 eat, use the bathroom, and take several five minute walks. Otherwise, keep your leg(s) elevated.                 Minimize the amount of time you are up on your feet to about 30 minutes at a time.  3. Bandages   - The incision sites will be covered with soft bandages and an ACE wrap. Keep your bandages on and    dry for 48 hours. The ACE should provide  snug  compression, but should not cause pain or numbness    in the toes. If you have significant discomfort or your toes become cold or numb, unwrap your ACE and    rewrap with less tension starting at the toes wrapping upward.  4. Incisions   - Bleeding: You may see some incision sites that are oozing through the bandages. This is not unusual    and can be managed with Rest, Ice, Compression and Elevation (RICE). Apply  ice and firm pressure    directly to the site that is bleeding and rest with your leg(s) elevated above your heart for 20-30 minutes.    Post-Op Day One:  1. Medication   - Ibuprofen:  Continue the same as the Day of Your Procedure. If your pain is not controlled with    ibuprofen, you may take prescription pain medication (such as Norco), if prescribed.   2. Activity   - We would like you to get up at least six times and walk around for short periods of time, unless it is    causing you pain. You should not be on your feet more than 90 minutes at a time. Elevate your leg    above your heart when you are not walking.  3. Bandages   - Your bandages must be kept on and dry for 48 hours.  4. Driving   - You may resume driving when you can do so safely. Do not drive if you are taking narcotic pain    medication.    Post-Op Day Two:   1. Medication  - Ibuprofen: Continue the same as the Day of Your Procedure.  2.  Activity   - Walk as tolerated. Elevate as much as possible when not walking.  3. Bandages and Compression  - Remove ACE wrap and padding. Shower and put on your compression hose during waking hours only for at least 5 days. (Your doctor may instruct you to keep your bandages on until your return appointment; please follow your doctor's instructions.)  4. Incisions   - Your leg(s) will be bruised; there may be swelling, hard knots under the skin and possibly some    numbness. These will likely resolve over time. If you see  hair-like  strings coming out of your    incisions, do not pull them (this will only cause pain/discomfort). We will trim them when you come   back for your follow-up appointment.  5. Call Us If:   - You see any areas on your leg that are red and angry in appearance.   - You notice any drainage that is milky or cloudy in appearance or that has a foul odor.   - You run a temperature of 100.5 or greater.    Post-Op Day Three:  You will have a follow up appointment 2-4 days post-procedure. At  this appointment, you will have an ultrasound and we will check your incisions.    _______________________________________________________________________________________________    The Two Weeks Following Your Procedure  1.  Skin Care   - Do not use any lotions, creams or powders on your leg for 14 days or until the incisions have healed.   - Do not soak in a bathtub, whirlpool, or hot tub or go swimming for 14 days or until your incisions have  healed.  2.  Medications   - You may use ibuprofen or acetaminophen (e.g., Tylenol) as needed for pain or discomfort.  3.  Activity   - Do not lift over 25 pounds. After about two weeks you may resume exercise such as aerobics, running,    tennis or weight lifting. Use your common sense and ease back into your exercise routine slowly.   - You may feel a cord-like tightness along the inside of your leg. Gentle stretching can be helpful.  4. Compression Hose   - Your doctor may instruct you to wear compression for longer than seven days; please    follow your doctor's instructions. As a comfort measure, you may choose to wear compression for    longer than required.  5.  Travel   - Do not fly in an airplane for 14 days after your procedure. If you have a long car trip planned within    two to three weeks following your procedure, stop and walk for a few minutes every two hours.    Periodic ankle pumps during the ride may be helpful.    Six Week Appointment   At your six week appointment, you will see your surgeon for an exam and evaluation. This office visit    will be scheduled when you return for Post-op Day Three Return Appointment.     Return to Work  1.  If you work outside the home, you may return to work in a few days depending on the extent of your procedure, how you tolerate it, and the type of work you perform.  2.  Paperwork: If your employer requires paperwork or you would like a letter written to your employer, please let us know. We will complete disability type  forms at no charge. Please allow five business days for forms to be completed.

## 2022-11-23 ENCOUNTER — OFFICE VISIT (OUTPATIENT)
Dept: VASCULAR SURGERY | Facility: CLINIC | Age: 47
End: 2022-11-23
Payer: COMMERCIAL

## 2022-11-23 VITALS — DIASTOLIC BLOOD PRESSURE: 74 MMHG | OXYGEN SATURATION: 98 % | HEART RATE: 78 BPM | SYSTOLIC BLOOD PRESSURE: 111 MMHG

## 2022-11-23 DIAGNOSIS — I83.812 VARICOSE VEINS OF LEFT LOWER EXTREMITY WITH PAIN: ICD-10-CM

## 2022-11-23 DIAGNOSIS — I83.892 VARICOSE VEINS OF LEG WITH SWELLING, LEFT: Primary | ICD-10-CM

## 2022-11-23 DIAGNOSIS — G89.18 ACUTE POST-OPERATIVE PAIN: ICD-10-CM

## 2022-11-23 PROCEDURE — 36475 ENDOVENOUS RF 1ST VEIN: CPT | Mod: LT | Performed by: SPECIALIST

## 2022-11-23 PROCEDURE — 37765 STAB PHLEB VEINS XTR 10-20: CPT | Mod: LT | Performed by: SPECIALIST

## 2022-11-23 RX ORDER — HYDROCODONE BITARTRATE AND ACETAMINOPHEN 5; 325 MG/1; MG/1
1 TABLET ORAL EVERY 6 HOURS PRN
Qty: 6 TABLET | Refills: 0 | Status: SHIPPED | OUTPATIENT
Start: 2022-11-23 | End: 2022-11-25

## 2022-11-23 NOTE — PROGRESS NOTES
Pre-procedure Nursing Note    Roxane Lopes presents to clinic for Vein Procedure  .   /Person Responsible for Patient: Luiz (Spouse)  Phone Number: [phone]    Prophylactic Medication:N/A   Sedation Medication: Ativan, 3mg ,   Time Taken: 0757 and Clonidine, 0.1mg,   Time Taken: 0757  Compression Stockings: Patient left hose at home.  The procedure is being performed on LLE.  Patient understanding of procedure matches consent? YES    Patient's pre-procedure medications verified by AF.    Tamera Oneill RN on 11/23/2022 at 7:54 AM

## 2022-11-23 NOTE — PROGRESS NOTES
[facility] Procedure Note    Roxane Lopes  November 23, 2022    Roxane Lopes is a 47 year old year old female. She presents for Vein Procedure  .    /74   Pulse 78   SpO2 98%     Flowsheet Data 11/23/2022   Procedure Start Time:  9:02 AM   Prep: Chloraprep   Side: Left   Tx Length (cm): LEFT GSV: 59   Junction (cm): LEFT GSV: 2.54   RF Cycles: LEFT GSV: 12   RF TX Time (Minutes): LEFT GSV: 4:00   # PHLEB Sites: 13   Sedation taken: Yes   Pre Pt. Physical / Cognitive Limitations: WNL   TOTAL Local anesthesia Injected (ml): 5.5   Max Volume Local Anesthesia (ml): 11   TOTAL Tumescent Injected volume (ml): 375   Max Volume Tumescent (ml): 572   Post Pt. Physical / Cognitive Limitations: WNL   Procedure End Time:  9:45 AM   D/C Instructions given, states readiness to leave and escorted to car: Yes       Venus Closure    Date/Time: 11/23/2022 9:59 AM  Performed by: Urbano Bowman MD  Authorized by: Urbano Bowman MD     Preparation: Patient was prepped and draped in usual sterile fashion    1st Assist: Jennifer Tinsley CST/AAMIR    Circulator: Tamera Oneill RN    Procedure:  VNUS  Procedure side:  Left  One Vein    Vein Treated:  GSV  Wrap/Hose:  Wraps  Phlebectomy    Date/Time: 11/23/2022 9:59 AM  Performed by: Urbano Bowman MD  Authorized by: Urbano Bowman MD     Time out: Immediately prior to the procedure a time out was called    Preparation: Patient was prepped and draped in usual sterile fashion    1st Assist: Jennifer Tinsley CST/AAMIR    Circulator: Tamera Oneill RN    Procedure:  Phlebectomies  Type:  Medically Necessary  Procedure side:  Left  Stabs:  10-20  Patient tolerance:  Patient tolerated the procedure well with no immediate complications  Wrap/Hose:  Wraps      Details procedure:  With the cooperation the patient.  Holding the left lower extremities marked both areas for staff back to me.  She was taken to the procedure room and left lower extremities prepped and draped in  sterile fashion.  A timeout was performed confirming the identity the patient as well as the procedure be performed.  A GSV access point was chosen in the mid calf below a large cluster of veins.  The vein was accessed with a micropuncture needle and a 7 [place] sheath.  The vein the catheter was then passed up the GSV and positioned 2.54 cm in the saphenofemoral junction.  Tumescent solution was placed around the vein and radiofrequency ablation was carried out in 7 cm segments.  The areas marked for staff lobectomy then infiltrated local anesthetic.  After adequate analgesia achieved multiple stabs were made with an ophthalmic blade and the veins remove the combination tierney hooks and mosquito clamps.  This was done for total 13 stabs.    Urbano Bowman MD, FACS

## 2022-11-23 NOTE — LETTER
11/23/2022         RE: Roxane Lopes  [address] [place] [id number]        Dear Colleague,    Thank you for referring your patient, Roxane Lopes, to the [facility] [place]. Please see a copy of my visit note below.    Pre-procedure Nursing Note    Roxane Lopes presents to clinic for Vein Procedure  .   /Person Responsible for Patient: Luiz (Spouse)  Phone Number: [phone]    Prophylactic Medication:N/A   Sedation Medication: Ativan, 3mg ,   Time Taken: 0757 and Clonidine, 0.1mg,   Time Taken: 0757  Compression Stockings: Patient left hose at home.  The procedure is being performed on LLE.  Patient understanding of procedure matches consent? YES    Patient's pre-procedure medications verified by AF.    Tamera Oneill RN on 11/23/2022 at 7:54 AM        [facility] Procedure Note    Roxane Lopes  November 23, 2022    Roxane Lopes is a 47 year old year old female. She presents for Vein Procedure  .    /74   Pulse 78   SpO2 98%     Flowsheet Data 11/23/2022   Procedure Start Time:  9:02 AM   Prep: Chloraprep   Side: Left   Tx Length (cm): LEFT GSV: 59   Junction (cm): LEFT GSV: 2.54   RF Cycles: LEFT GSV: 12   RF TX Time (Minutes): LEFT GSV: 4:00   # PHLEB Sites: 13   Sedation taken: Yes   Pre Pt. Physical / Cognitive Limitations: WNL   TOTAL Local anesthesia Injected (ml): 5.5   Max Volume Local Anesthesia (ml): 11   TOTAL Tumescent Injected volume (ml): 375   Max Volume Tumescent (ml): 572   Post Pt. Physical / Cognitive Limitations: WNL   Procedure End Time:  9:45 AM   D/C Instructions given, states readiness to leave and escorted to car: Yes       Venus Closure    Date/Time: 11/23/2022 9:59 AM  Performed by: Urbano Bowman MD  Authorized by: Urbano Bowman MD     Preparation: Patient was prepped and draped in usual sterile fashion    1st Assist: Jennifer Tinsley, CST/CSFA    Circulator: Tamera Oneill RN    Procedure:  VNUS  Procedure side:   Left  One Vein    Vein Treated:  GSV  Wrap/Hose:  Wraps  Phlebectomy    Date/Time: 11/23/2022 9:59 AM  Performed by: Urbano Bowman MD  Authorized by: Urbano Bowman MD     Time out: Immediately prior to the procedure a time out was called    Preparation: Patient was prepped and draped in usual sterile fashion    1st Assist: Jennifer Tinsley, CST/CSFA    Circulator: Tamera Oneill RN    Procedure:  Phlebectomies  Type:  Medically Necessary  Procedure side:  Left  Stabs:  10-20  Patient tolerance:  Patient tolerated the procedure well with no immediate complications  Wrap/Hose:  Wraps      Details procedure:  With the cooperation the patient.  Holding the left lower extremities marked both areas for staff back to me.  She was taken to the procedure room and left lower extremities prepped and draped in sterile fashion.  A timeout was performed confirming the identity the patient as well as the procedure be performed.  A GSV access point was chosen in the mid calf below a large cluster of veins.  The vein was accessed with a micropuncture needle and a 7 Malay sheath.  The vein the catheter was then passed up the GSV and positioned 2.54 cm in the saphenofemoral junction.  Tumescent solution was placed around the vein and radiofrequency ablation was carried out in 7 cm segments.  The areas marked for staff lobectomy then infiltrated local anesthetic.  After adequate analgesia achieved multiple stabs were made with an ophthalmic blade and the veins remove the combination tierney hooks and mosquito clamps.  This was done for total 13 stabs.    Urbano Bowman MD, FACS      Again, thank you for allowing me to participate in the care of your patient.        Sincerely,        Urbano Bowman MD

## 2022-11-25 ENCOUNTER — ANCILLARY PROCEDURE (OUTPATIENT)
Dept: ULTRASOUND IMAGING | Facility: CLINIC | Age: 47
End: 2022-11-25
Attending: SPECIALIST
Payer: COMMERCIAL

## 2022-11-25 ENCOUNTER — OFFICE VISIT (OUTPATIENT)
Dept: VASCULAR SURGERY | Facility: CLINIC | Age: 47
End: 2022-11-25
Attending: SPECIALIST
Payer: COMMERCIAL

## 2022-11-25 DIAGNOSIS — Z09 POSTOP CHECK: Primary | ICD-10-CM

## 2022-11-25 DIAGNOSIS — I83.812 VARICOSE VEINS OF LEFT LOWER EXTREMITY WITH PAIN: ICD-10-CM

## 2022-11-25 PROCEDURE — 99207 PR NO CHARGE NURSE ONLY: CPT

## 2022-11-25 PROCEDURE — 93971 EXTREMITY STUDY: CPT | Mod: LT | Performed by: SPECIALIST

## 2022-11-25 NOTE — PATIENT INSTRUCTIONS
Vein Closure (Ablation)  Common Things to Expect    Small lumps may develop beneath phlebectomy sites and the site where the vein closure device was inserted.  This is a normal step in healing.  These should not be painful, but may be tender to the touch. It can take 6 weeks to 3 months for these lumps/firmness to resolve.   Bruising will look worse before it looks better and can last for 4-6 weeks.  After about 10 days, you may notice tightness/pulling on the inside thigh and knee. As your ablated vein is healing, it contracts, causing a tightness or pulling sensation. This may last for several weeks, but will resolve. Treat it with Ibuprofen or Advil.  Numbness will get better with time, but may take 3 months to a year to resolve.  You may notice that the skin on your legs has become ultra-sensitive to touch. For example, the weight of your sheets may feel painful. This usually resolves in 6 weeks.  Ankle swelling is not uncommon and may last 4-6 weeks.   To get optimal results from your procedure, wearing your compression hose is key for the first 7 days. This is necessary to ensure proper closure of the ablated vein.  For 2 weeks, no weight lifting over 25lbs, no running, and no vigorous aerobic exercise. After this time, ease back into your normal activities. If you do too much too soon, you will have more pain and bruising and possibly re-open the vein that was closed. It takes about 2 weeks for the ablated vein to permanently close. Keep in mind your body is still healing.  For 2 weeks, do not shave your legs or use lotions, powders, creams to allow proper healing of phlebectomy sites and vein access sites.    Vein Removal (Phlebectomy)  Common Things to Expect     Small lumps may develop beneath phlebectomy sites and the site where the vein closure device was inserted. This is a normal step in healing.  These should not be painful, but may be tender to the touch. It can take 6 weeks to 3 months for these  lumps/firmness to resolve.  Bruising will look worse before it looks better and can last for 4-6 weeks.  Ankle swelling is not uncommon and may last 4-6 weeks.       If you are experiencing any of the following symptoms, please seek immediate medical attention at your local emergency department.  - Significant pain in the back of the calf possibly with difficulty walking  - Significant swelling and/or tenderness in the back of the calf  - Redness that continues to spread  - Chest pain and/or shortness of breath

## 2022-11-25 NOTE — LETTER
11/25/2022         RE: Roxane Lopes  [address] [place] [id number]        Dear Colleague,    Thank you for referring your patient, Roxane Lopes, to the [facility] [place]. Please see a copy of my visit note below.      November 25, 2022    Vein Clinic Postoperative Nurse Note    Patient is here with her spouse for her 48 hour postoperative visit.    Procedure: Left leg VNUS closure GSV(med nec), 20-30 stab phlebs(med nec)  Procedure Date: 11/23/22  Surgeon: Dr. Bowman    Ultrasound Result: The left GSV is closed 7.0mm from SFJ to mid-distal calf. No evidence of LLE DVT.    Physical Exam: Incisions are approximated without signs of infection.  Ecchymosis: moderate to left medial proximal calf  Swelling: minimal  Paresthesia: pt denies numbness    Patient Questions or Concerns: Pt is doing well and has no concerns.    Pt is able to don her thigh high compression hose.    Reviewed postoperative instructions with patient and provided her with written material of common things to expect from her procedure.    Patient's Next Vein Clinic Appointment: 6 week post op with Dr. Bowman (1/3/23).    Salina Paredes RN  [facility]      Again, thank you for allowing me to participate in the care of your patient.        Sincerely,         Vein Nurse

## 2022-11-25 NOTE — PROGRESS NOTES
November 25, 2022    Vein Clinic Postoperative Nurse Note    Patient is here with her spouse for her 48 hour postoperative visit.    Procedure: Left leg VNUS closure GSV(med nec), 20-30 stab phlebs(med [facility])  Procedure Date: 11/23/22  Surgeon: Dr. Bowman    Ultrasound Result: The left GSV is closed 7.0mm from SFJ to mid-distal calf. No evidence of LLE DVT.    Physical Exam: Incisions are approximated without signs of infection.  Ecchymosis: moderate to left medial proximal calf  Swelling: minimal  Paresthesia: pt denies numbness    Patient Questions or Concerns: Pt is doing well and has no concerns.    Pt is able to don her thigh high compression hose.    Reviewed postoperative instructions with patient and provided her with written material of common things to expect from her procedure.    Patient's Next Vein Clinic Appointment: 6 week post op with Dr. Bowman (1/3/23).    Salina Paredes RN  [facility]

## 2022-12-07 DIAGNOSIS — F41.8 DEPRESSION WITH ANXIETY: ICD-10-CM

## 2022-12-07 DIAGNOSIS — I10 ESSENTIAL HYPERTENSION: ICD-10-CM

## 2022-12-07 NOTE — TELEPHONE ENCOUNTER
No refills until visit and labs set up. Please help her setup video visit for next Tuesday with fasting labs before.   Thank you,  MEENAKSHI Ureña, NP-C  [facility]

## 2022-12-08 ENCOUNTER — DOCUMENTATION ONLY (OUTPATIENT)
Dept: LAB | Facility: CLINIC | Age: 47
End: 2022-12-08

## 2022-12-08 ENCOUNTER — LAB (OUTPATIENT)
Dept: LAB | Facility: CLINIC | Age: 47
End: 2022-12-08
Payer: COMMERCIAL

## 2022-12-08 DIAGNOSIS — I10 HTN, GOAL BELOW 140/90: ICD-10-CM

## 2022-12-08 DIAGNOSIS — Z13.220 SCREENING FOR CHOLESTEROL LEVEL: ICD-10-CM

## 2022-12-08 DIAGNOSIS — R73.03 PREDIABETES: Primary | ICD-10-CM

## 2022-12-08 DIAGNOSIS — R73.03 PREDIABETES: ICD-10-CM

## 2022-12-08 LAB
ERYTHROCYTE [DISTWIDTH] IN BLOOD BY AUTOMATED COUNT: 14.1 % (ref 10–15)
HBA1C MFR BLD: 5.4 % (ref 0–5.6)
HCT VFR BLD AUTO: 35.6 % (ref 35–47)
HGB BLD-MCNC: 11.8 G/DL (ref 11.7–15.7)
MCH RBC QN AUTO: 30.9 PG (ref 26.5–33)
MCHC RBC AUTO-ENTMCNC: 33.1 G/DL (ref 31.5–36.5)
MCV RBC AUTO: 93 FL (ref 78–100)
PLATELET # BLD AUTO: 350 10E3/UL (ref 150–450)
RBC # BLD AUTO: 3.82 10E6/UL (ref 3.8–5.2)
WBC # BLD AUTO: 7.3 10E3/UL (ref 4–11)

## 2022-12-08 PROCEDURE — 85027 COMPLETE CBC AUTOMATED: CPT

## 2022-12-08 PROCEDURE — 36415 COLL VENOUS BLD VENIPUNCTURE: CPT

## 2022-12-08 PROCEDURE — 83036 HEMOGLOBIN GLYCOSYLATED A1C: CPT

## 2022-12-08 PROCEDURE — 80053 COMPREHEN METABOLIC PANEL: CPT

## 2022-12-08 PROCEDURE — 80061 LIPID PANEL: CPT

## 2022-12-08 RX ORDER — LOSARTAN POTASSIUM 50 MG/1
75 TABLET ORAL DAILY
Qty: 60 TABLET | Refills: 0 | Status: SHIPPED | OUTPATIENT
Start: 2022-12-08 | End: 2022-12-12

## 2022-12-08 RX ORDER — VENLAFAXINE HYDROCHLORIDE 75 MG/1
CAPSULE, EXTENDED RELEASE ORAL
Qty: 30 CAPSULE | Refills: 0 | Status: SHIPPED | OUTPATIENT
Start: 2022-12-08 | End: 2022-12-12

## 2022-12-08 RX ORDER — VENLAFAXINE HYDROCHLORIDE 150 MG/1
CAPSULE, EXTENDED RELEASE ORAL
Qty: 30 CAPSULE | Refills: 0 | Status: SHIPPED | OUTPATIENT
Start: 2022-12-08 | End: 2022-12-12

## 2022-12-08 NOTE — PROGRESS NOTES
Roxane Lopes has an upcoming lab appointment: 12/8/22    Future Appointments   Date Time Provider Department Center   12/8/2022 11:30 AM [facility] LAB [facility]   12/12/2022  1:30 PM Trupti Au NP [facility] [place]    1/3/2023 10:15 AM Urbano Bowman MD [facility]     Patient is scheduled for the following lab(s): none    There is no order available. Please review and place either future orders or HMPO (Review of Health Maintenance Protocol Orders), as appropriate.    Health Maintenance Due   Topic     ANNUAL REVIEW OF HM ORDERS      Yola Alicia

## 2022-12-08 NOTE — RESULT ENCOUNTER NOTE
En Betts,   A1C is stable at 5.4. CBC is normal. Other labs pending.  Thank you,  MEENAKSHI Ureña, NP-C  [facility]

## 2022-12-08 NOTE — TELEPHONE ENCOUNTER
Pt needs a refill on venlafaxine and her BP med.  I did schedule her for Monday at 130 for virtual visit with you and lab work today at  at 1130. She does not have any labs pended though.

## 2022-12-09 LAB
ALBUMIN SERPL-MCNC: 3.8 G/DL (ref 3.4–5)
ALP SERPL-CCNC: 76 U/L (ref 40–150)
ALT SERPL W P-5'-P-CCNC: 22 U/L (ref 0–50)
ANION GAP SERPL CALCULATED.3IONS-SCNC: 7 MMOL/L (ref 3–14)
AST SERPL W P-5'-P-CCNC: 13 U/L (ref 0–45)
BILIRUB SERPL-MCNC: 0.3 MG/DL (ref 0.2–1.3)
BUN SERPL-MCNC: 12 MG/DL (ref 7–30)
CALCIUM SERPL-MCNC: 9.2 MG/DL (ref 8.5–10.1)
CHLORIDE BLD-SCNC: 107 MMOL/L (ref 94–109)
CHOLEST SERPL-MCNC: 219 MG/DL
CO2 SERPL-SCNC: 25 MMOL/L (ref 20–32)
CREAT SERPL-MCNC: 0.6 MG/DL (ref 0.52–1.04)
FASTING STATUS PATIENT QL REPORTED: YES
GFR SERPL CREATININE-BSD FRML MDRD: >90 ML/MIN/1.73M2
GLUCOSE BLD-MCNC: 93 MG/DL (ref 70–99)
HDLC SERPL-MCNC: 65 MG/DL
LDLC SERPL CALC-MCNC: 133 MG/DL
NONHDLC SERPL-MCNC: 154 MG/DL
POTASSIUM BLD-SCNC: 5 MMOL/L (ref 3.4–5.3)
PROT SERPL-MCNC: 7.2 G/DL (ref 6.8–8.8)
SODIUM SERPL-SCNC: 139 MMOL/L (ref 133–144)
TRIGL SERPL-MCNC: 107 MG/DL

## 2022-12-09 NOTE — RESULT ENCOUNTER NOTE
En Betts,   Your labs are stable.  Thank you,  MEENAKSHI Ureña, NP-C  [facility]

## 2022-12-12 ENCOUNTER — TELEPHONE (OUTPATIENT)
Dept: FAMILY MEDICINE | Facility: CLINIC | Age: 47
End: 2022-12-12

## 2022-12-12 ENCOUNTER — VIRTUAL VISIT (OUTPATIENT)
Dept: FAMILY MEDICINE | Facility: CLINIC | Age: 47
End: 2022-12-12
Payer: COMMERCIAL

## 2022-12-12 DIAGNOSIS — I10 ESSENTIAL HYPERTENSION: Primary | ICD-10-CM

## 2022-12-12 DIAGNOSIS — G47.00 INSOMNIA, UNSPECIFIED TYPE: ICD-10-CM

## 2022-12-12 DIAGNOSIS — F41.9 ANXIETY: ICD-10-CM

## 2022-12-12 PROCEDURE — 99214 OFFICE O/P EST MOD 30 MIN: CPT | Mod: 95 | Performed by: NURSE PRACTITIONER

## 2022-12-12 RX ORDER — VENLAFAXINE HYDROCHLORIDE 75 MG/1
75 CAPSULE, EXTENDED RELEASE ORAL DAILY
Qty: 60 CAPSULE | Refills: 0 | Status: SHIPPED | OUTPATIENT
Start: 2022-12-12 | End: 2023-03-07

## 2022-12-12 RX ORDER — HYDROXYZINE HYDROCHLORIDE 25 MG/1
25-50 TABLET, FILM COATED ORAL
Qty: 60 TABLET | Refills: 1 | Status: SHIPPED | OUTPATIENT
Start: 2022-12-12 | End: 2023-04-13

## 2022-12-12 RX ORDER — LOSARTAN POTASSIUM 50 MG/1
50 TABLET ORAL DAILY
Qty: 90 TABLET | Refills: 1 | Status: SHIPPED | OUTPATIENT
Start: 2022-12-12 | End: 2023-04-13

## 2022-12-12 RX ORDER — VENLAFAXINE HYDROCHLORIDE 37.5 MG/1
37.5 CAPSULE, EXTENDED RELEASE ORAL DAILY
Qty: 60 CAPSULE | Refills: 0 | Status: SHIPPED | OUTPATIENT
Start: 2022-12-12 | End: 2023-01-06

## 2022-12-12 ASSESSMENT — ANXIETY QUESTIONNAIRES
IF YOU CHECKED OFF ANY PROBLEMS ON THIS QUESTIONNAIRE, HOW DIFFICULT HAVE THESE PROBLEMS MADE IT FOR YOU TO DO YOUR WORK, TAKE CARE OF THINGS AT HOME, OR GET ALONG WITH OTHER PEOPLE: NOT DIFFICULT AT ALL
2. NOT BEING ABLE TO STOP OR CONTROL WORRYING: NOT AT ALL
5. BEING SO RESTLESS THAT IT IS HARD TO SIT STILL: NOT AT ALL
7. FEELING AFRAID AS IF SOMETHING AWFUL MIGHT HAPPEN: NOT AT ALL
6. BECOMING EASILY ANNOYED OR IRRITABLE: NOT AT ALL
3. WORRYING TOO MUCH ABOUT DIFFERENT THINGS: NOT AT ALL
4. TROUBLE RELAXING: NOT AT ALL
8. IF YOU CHECKED OFF ANY PROBLEMS, HOW DIFFICULT HAVE THESE MADE IT FOR YOU TO DO YOUR WORK, TAKE CARE OF THINGS AT HOME, OR GET ALONG WITH OTHER PEOPLE?: NOT DIFFICULT AT ALL
GAD7 TOTAL SCORE: 0
1. FEELING NERVOUS, ANXIOUS, OR ON EDGE: NOT AT ALL
7. FEELING AFRAID AS IF SOMETHING AWFUL MIGHT HAPPEN: NOT AT ALL

## 2022-12-12 ASSESSMENT — PATIENT HEALTH QUESTIONNAIRE - PHQ9
SUM OF ALL RESPONSES TO PHQ QUESTIONS 1-9: 0
SUM OF ALL RESPONSES TO PHQ QUESTIONS 1-9: 0
10. IF YOU CHECKED OFF ANY PROBLEMS, HOW DIFFICULT HAVE THESE PROBLEMS MADE IT FOR YOU TO DO YOUR WORK, TAKE CARE OF THINGS AT HOME, OR GET ALONG WITH OTHER PEOPLE: NOT DIFFICULT AT ALL

## 2022-12-12 NOTE — TELEPHONE ENCOUNTER
Attempted to contact patient prior to virtual appointment today.    No answer. LVM @ 12:42    Maite Huerta RN  12/12/22

## 2022-12-12 NOTE — PROGRESS NOTES
"Roxane is a 47 year old who is being evaluated via a billable telephone visit.      What phone number would you like to be contacted at? [phone]   How would you like to obtain your AVS? St[place]  [facility] Location (provider location):  Off-site    Assessment & Plan     Essential hypertension  Feels things are stable on 1 tab, refill sent. Recent renal function normal  - losartan (COZAAR) 50 MG tablet; Take 1 tablet (50 mg) by mouth daily    Anxiety  Would like to wean down further is currently at 150 mg daily.     From AVS:  Weaning the Venlafaxine:   Take 75 mg + 37.5 mg daily for 10 days, then  Decrease to 75 mg daily for 10 days, then  Decrease to 37.5 mg daily for 10-14 days then stop    If any any point when you go down it feels bad, you have side effects, go back up to the last dose you were on for another 5-7 days before trying to wean down again    - venlafaxine (EFFEXOR XR) 75 MG 24 hr capsule; Take 1 capsule (75 mg) by mouth daily  - venlafaxine (EFFEXOR XR) 37.5 MG 24 hr capsule; Take 1 capsule (37.5 mg) by mouth daily    Insomnia, unspecified type  Works well for when she cannot sleep  - hydrOXYzine (ATARAX) 25 MG tablet; Take 1-2 tablets (25-50 mg) by mouth nightly as needed for other (insomnia)       BMI:   Estimated body mass index is 33.88 kg/m  as calculated from the following:    Height as of 7/7/22: 1.689 m (5' 6.5\").    Weight as of 7/7/22: 96.7 kg (213 lb 1.6 oz).     Return in about 6 months (around 6/12/2023) for Medication check, Phone or Video visit okay.    MEENAKSHI Ureña, NP-C  [facility]   Roxane is a 47 year old accompanied by her self, presenting for the following health issues:  Depression and Anxiety      History of Present Illness       Mental Health Follow-up:  Patient presents to follow-up on Depression & Anxiety.Patient's depression since last visit has been:  Good  The patient is not having other symptoms associated with " depression.  Patient's anxiety since last visit has been:  Good  The patient is not having other symptoms associated with anxiety.  Any significant life events: No  Patient is not feeling anxious or having panic attacks.  Patient has no concerns about alcohol or drug use.    She eats 0-1 servings of fruits and vegetables daily.She consumes 2 sweetened beverage(s) daily.She exercises with enough effort to increase her heart rate 9 or less minutes per day.  She exercises with enough effort to increase her heart rate 3 or less days per week.   She is taking medications regularly.    Today's PHQ-9         PHQ-9 Total Score: 0    PHQ-9 Q9 Thoughts of better off dead/self-harm past 2 weeks :   Not at all    How difficult have these problems made it for you to do your work, take care of things at home, or get along with other people: Not difficult at all  Today's OLLIE-7 Score: 0       Losartan: went back to 1 pill, was stable at home. In 138    Venlafaxine: is down to 150 mg daily. Would like to wean further. Feels things are going really well          Review of Systems   Constitutional, HEENT, cardiovascular, pulmonary, gi and gu systems are negative, except as otherwise noted.      Objective    Vitals - Patient Reported  Pain Score: No Pain (0)      Vitals:  No vitals were obtained today due to virtual visit.    Physical Exam   healthy, alert and no distress  PSYCH: Alert and oriented times 3; coherent speech, normal   rate and volume, able to articulate logical thoughts, able   to abstract reason, no tangential thoughts, no hallucinations   or delusions  Her affect is normal  RESP: No cough, no audible wheezing, able to talk in full sentences  Remainder of exam unable to be completed due to telephone visits    Lab on 12/08/2022   Component Date Value Ref Range Status     Cholesterol 12/08/2022 219 (H)  <200 mg/dL Final     Triglycerides 12/08/2022 107  <150 mg/dL Final     Direct Measure HDL 12/08/2022 65  >=50 mg/dL  Final     LDL Cholesterol Calculated 12/08/2022 133 (H)  <=100 mg/dL Final     Non HDL Cholesterol 12/08/2022 154 (H)  <130 mg/dL Final     Patient Fasting > 8hrs? 12/08/2022 Yes   Final     Sodium 12/08/2022 139  133 - 144 mmol/L Final     Potassium 12/08/2022 5.0  3.4 - 5.3 mmol/L Final     Chloride 12/08/2022 107  94 - 109 mmol/L Final     Carbon Dioxide (CO2) 12/08/2022 25  20 - 32 mmol/L Final     Anion Gap 12/08/2022 7  3 - 14 mmol/L Final     Urea Nitrogen 12/08/2022 12  7 - 30 mg/dL Final     Creatinine 12/08/2022 0.60  0.52 - 1.04 mg/dL Final     Calcium 12/08/2022 9.2  8.5 - 10.1 mg/dL Final     Glucose 12/08/2022 93  70 - 99 mg/dL Final     Alkaline Phosphatase 12/08/2022 76  40 - 150 U/L Final     AST 12/08/2022 13  0 - 45 U/L Final     ALT 12/08/2022 22  0 - 50 U/L Final     Protein Total 12/08/2022 7.2  6.8 - 8.8 g/dL Final     Albumin 12/08/2022 3.8  3.4 - 5.0 g/dL Final     Bilirubin Total 12/08/2022 0.3  0.2 - 1.3 mg/dL Final     GFR Estimate 12/08/2022 >90  >60 mL/min/1.73m2 Final    Effective December 21, 2021 eGFRcr in adults is calculated using the 2021 CKD-EPI creatinine equation which includes age and gender (Charo et al., NE, DOI: 10.1056/BSSIno7422272)     Hemoglobin A1C 12/08/2022 5.4  0.0 - 5.6 % Final    Normal <5.7%   Prediabetes 5.7-6.4%    Diabetes 6.5% or higher     Note: Adopted from ADA consensus guidelines.     WBC Count 12/08/2022 7.3  4.0 - 11.0 10e3/uL Final     RBC Count 12/08/2022 3.82  3.80 - 5.20 10e6/uL Final     Hemoglobin 12/08/2022 11.8  11.7 - 15.7 g/dL Final     Hematocrit 12/08/2022 35.6  35.0 - 47.0 % Final     MCV 12/08/2022 93  78 - 100 fL Final     MCH 12/08/2022 30.9  26.5 - 33.0 pg Final     MCHC 12/08/2022 33.1  31.5 - 36.5 g/dL Final     RDW 12/08/2022 14.1  10.0 - 15.0 % Final     Platelet Count 12/08/2022 350  150 - 450 10e3/uL Final         Phone call duration: 6 minutes

## 2022-12-12 NOTE — PATIENT INSTRUCTIONS
Weaning the Venlafaxine:   Take 75 mg + 37.5 mg daily for 10 days, then  Decrease to 75 mg daily for 10 days, then  Decrease to 37.5 mg daily for 10-14 days then stop    If any any point when you go down it feels bad, you have side effects, go back up to the last dose you were on for another 5-7 days before trying to wean down again   none/eyeglasses

## 2023-01-06 DIAGNOSIS — F41.9 ANXIETY: ICD-10-CM

## 2023-01-06 RX ORDER — VENLAFAXINE HYDROCHLORIDE 37.5 MG/1
37.5 CAPSULE, EXTENDED RELEASE ORAL DAILY
Qty: 60 CAPSULE | Refills: 0 | Status: SHIPPED | OUTPATIENT
Start: 2023-01-06 | End: 2023-04-13

## 2023-01-27 DIAGNOSIS — Z86.718 HISTORY OF DEEP VENOUS THROMBOSIS: ICD-10-CM

## 2023-01-27 DIAGNOSIS — Z86.718 HISTORY OF DEEP VENOUS THROMBOSIS: Primary | ICD-10-CM

## 2023-03-03 ENCOUNTER — OFFICE VISIT (OUTPATIENT)
Dept: URGENT CARE | Facility: URGENT CARE | Age: 48
End: 2023-03-03
Payer: COMMERCIAL

## 2023-03-03 VITALS
DIASTOLIC BLOOD PRESSURE: 88 MMHG | TEMPERATURE: 98 F | OXYGEN SATURATION: 96 % | SYSTOLIC BLOOD PRESSURE: 127 MMHG | BODY MASS INDEX: 34.45 KG/M2 | HEART RATE: 100 BPM | WEIGHT: 216.7 LBS

## 2023-03-03 DIAGNOSIS — Z86.718 PERSONAL HISTORY OF DVT (DEEP VEIN THROMBOSIS): ICD-10-CM

## 2023-03-03 DIAGNOSIS — R14.0 ABDOMINAL DISTENSION: ICD-10-CM

## 2023-03-03 DIAGNOSIS — R19.7 DIARRHEA, UNSPECIFIED TYPE: ICD-10-CM

## 2023-03-03 DIAGNOSIS — Z79.01 ANTICOAGULATED: ICD-10-CM

## 2023-03-03 DIAGNOSIS — R10.32 LLQ ABDOMINAL PAIN: Primary | ICD-10-CM

## 2023-03-03 PROCEDURE — 99214 OFFICE O/P EST MOD 30 MIN: CPT | Performed by: PHYSICIAN ASSISTANT

## 2023-03-03 NOTE — PROGRESS NOTES
S: 48 yo female is here for lower abdominal discomfort for 1 week.  Denies dysuria.  Abdomen feels bloated.  Seems worse after eating.  Status postcholecystectomy.  No fever but has felt chills.  Pasty diarrhea for 1 week.  Some nausea, no vomiting.          Allergies   Allergen Reactions     Chantix [Varenicline Tartrate] Anxiety       Past Medical History:   Diagnosis Date     Benign essential hypertension      Depression      DVT (deep venous thrombosis) (H) 04/2011    left leg, was on lovenox and coumadin but have been off since November 2011     Essential hypertension 12/20/2021     H/O tubal ligation     2006      Obesity      Post-traumatic osteoarthritis of right shoulder 5/5/2022     Prediabetes        apixaban ANTICOAGULANT (ELIQUIS ANTICOAGULANT) 2.5 MG tablet, Take 1 tablet (2.5 mg) by mouth 2 times daily  cyclobenzaprine (FLEXERIL) 10 MG tablet, Take 1 tablet (10 mg) by mouth 3 times daily as needed for muscle spasms  gabapentin (NEURONTIN) 100 MG capsule, Take 1 capsule (100 mg) by mouth 3 times daily  hydrOXYzine (ATARAX) 25 MG tablet, Take 1-2 tablets (25-50 mg) by mouth nightly as needed for other (insomnia)  losartan (COZAAR) 50 MG tablet, Take 1 tablet (50 mg) by mouth daily  ondansetron (ZOFRAN ODT) 4 MG ODT tab, Take 1-2 tablets (4-8 mg) by mouth every 8 hours as needed for nausea  SUMAtriptan (IMITREX) 50 MG tablet, Take 1 tablet (50 mg) by mouth at onset of headache for migraine May repeat in 2 hours. Max 4 tablets/24 hours.  venlafaxine (EFFEXOR XR) 37.5 MG 24 hr capsule, Take 1 capsule (37.5 mg) by mouth daily  venlafaxine (EFFEXOR XR) 75 MG 24 hr capsule, Take 1 capsule (75 mg) by mouth daily    No current facility-administered medications on file prior to visit.      Social History     Tobacco Use     Smoking status: Former     Packs/day: 0.50     Years: 10.00     Pack years: 5.00     Types: Cigarettes, Other     Quit date: 10/16/2012     Years since quitting: 10.3     Passive exposure:  Past     Smokeless tobacco: Never     Tobacco comments:     Vaping now    Vaping Use     Vaping Use: Every day     Substances: Nicotine, Flavoring   Substance Use Topics     Alcohol use: Yes     Comment: Maybe once a year     Drug use: No       ROS:  General: negative for fever  Resp: negative for chest pain   CV: negative for chest pain  ABD: as above  : negative for dysuria  Neurologic:negative for Headache    OBJECTIVE:  /88 (BP Location: Left arm, Patient Position: Sitting, Cuff Size: Adult Regular)   Pulse 100   Temp 98  F (36.7  C) (Tympanic)   Wt 98.3 kg (216 lb 11.2 oz)   SpO2 96%   BMI 34.45 kg/m     General:   awake, alert, and cooperative.  NAD.   Head: Normocephalic, atraumatic.  Eyes: Conjunctiva clear, non icteric.   Heart: Regular rate and rhythm. No murmur.  Lungs: Chest is clear; no wheezes or rales.  ABD: soft, lower abdominal tenderness but mainly left lower quadrant, moderate. No rigidity, guarding or rebound , bowel sounds intact  Neuro: Alert and oriented - normal speech.     ASSESSMENT:    ICD-10-CM    1. LLQ abdominal pain  R10.32       2. Diarrhea, unspecified type  R19.7       3. Abdominal distension  R14.0       4. Personal history of DVT (deep vein thrombosis)  Z86.718       5. Anticoagulated  Z79.01           PLAN:      Lower abdominal pain for 1 week with diarrhea.  Worse after eating.  Tender left lower quadrant.  Unable to rule out things such as diverticulitis as we are limited on labs and imaging.  To ER for further evaluation and treatment.  Advised about symptoms which might herald more serious problems.        Nanette Card PA-C

## 2023-04-11 DIAGNOSIS — M62.838 SPASM OF MUSCLE: ICD-10-CM

## 2023-04-11 RX ORDER — CYCLOBENZAPRINE HCL 10 MG
10 TABLET ORAL 3 TIMES DAILY PRN
Qty: 90 TABLET | Refills: 3 | Status: SHIPPED | OUTPATIENT
Start: 2023-04-11 | End: 2023-04-13

## 2023-04-11 NOTE — TELEPHONE ENCOUNTER
Medication Question or Refill    Contacts       Type Contact Phone/Fax    04/11/2023 01:46 PM CDT Fax (Incoming) [facility]/pharmacy #[id number] - [place], [place] - [address] (Pharmacy) [phone]          What medication are you calling about (include dose and sig)?: cyclobenzaprine (FLEXERIL) 10 MG tablet    Preferred Pharmacy:  [facility]/pharmacy #[id number] - [place] - [address]  [address] [facility] [address]  Phone: [phone] Fax: [phone]      Controlled Substance Agreement on file:   CSA -- Patient Level:    CSA: None found at the patient level.       Who prescribed the medication?: Trupti Au    Do you need a refill? Yes

## 2023-04-13 ENCOUNTER — LAB (OUTPATIENT)
Dept: FAMILY MEDICINE | Facility: CLINIC | Age: 48
End: 2023-04-13

## 2023-04-13 ENCOUNTER — OFFICE VISIT (OUTPATIENT)
Dept: FAMILY MEDICINE | Facility: CLINIC | Age: 48
End: 2023-04-13
Payer: COMMERCIAL

## 2023-04-13 VITALS
WEIGHT: 212.5 LBS | RESPIRATION RATE: 19 BRPM | DIASTOLIC BLOOD PRESSURE: 85 MMHG | OXYGEN SATURATION: 99 % | TEMPERATURE: 98.9 F | HEART RATE: 82 BPM | BODY MASS INDEX: 33.35 KG/M2 | HEIGHT: 67 IN | SYSTOLIC BLOOD PRESSURE: 124 MMHG

## 2023-04-13 DIAGNOSIS — Z86.718 HISTORY OF DVT (DEEP VEIN THROMBOSIS): ICD-10-CM

## 2023-04-13 DIAGNOSIS — R19.7 DIARRHEA, UNSPECIFIED TYPE: ICD-10-CM

## 2023-04-13 DIAGNOSIS — G47.00 INSOMNIA, UNSPECIFIED TYPE: ICD-10-CM

## 2023-04-13 DIAGNOSIS — R10.84 ABDOMINAL PAIN, GENERALIZED: Primary | ICD-10-CM

## 2023-04-13 DIAGNOSIS — G43.009 MIGRAINE WITHOUT AURA AND WITHOUT STATUS MIGRAINOSUS, NOT INTRACTABLE: ICD-10-CM

## 2023-04-13 DIAGNOSIS — M62.838 SPASM OF MUSCLE: ICD-10-CM

## 2023-04-13 DIAGNOSIS — I10 ESSENTIAL HYPERTENSION: ICD-10-CM

## 2023-04-13 LAB
ALBUMIN SERPL-MCNC: 3.8 G/DL (ref 3.4–5)
ALP SERPL-CCNC: 65 U/L (ref 40–150)
ALT SERPL W P-5'-P-CCNC: 21 U/L (ref 0–50)
ANION GAP SERPL CALCULATED.3IONS-SCNC: 2 MMOL/L (ref 3–14)
AST SERPL W P-5'-P-CCNC: 18 U/L (ref 0–45)
BILIRUB SERPL-MCNC: 0.4 MG/DL (ref 0.2–1.3)
BUN SERPL-MCNC: 8 MG/DL (ref 7–30)
CALCIUM SERPL-MCNC: 9.1 MG/DL (ref 8.5–10.1)
CHLORIDE BLD-SCNC: 108 MMOL/L (ref 94–109)
CO2 SERPL-SCNC: 26 MMOL/L (ref 20–32)
CREAT SERPL-MCNC: 0.69 MG/DL (ref 0.52–1.04)
ERYTHROCYTE [DISTWIDTH] IN BLOOD BY AUTOMATED COUNT: 14.5 % (ref 10–15)
GFR SERPL CREATININE-BSD FRML MDRD: >90 ML/MIN/1.73M2
GLUCOSE BLD-MCNC: 94 MG/DL (ref 70–99)
HBA1C MFR BLD: 5 % (ref 0–5.6)
HCT VFR BLD AUTO: 35.2 % (ref 35–47)
HGB BLD-MCNC: 11.8 G/DL (ref 11.7–15.7)
LIPASE SERPL-CCNC: 70 U/L (ref 73–393)
MCH RBC QN AUTO: 30.8 PG (ref 26.5–33)
MCHC RBC AUTO-ENTMCNC: 33.5 G/DL (ref 31.5–36.5)
MCV RBC AUTO: 92 FL (ref 78–100)
PLATELET # BLD AUTO: 338 10E3/UL (ref 150–450)
POTASSIUM BLD-SCNC: 4.7 MMOL/L (ref 3.4–5.3)
PROT SERPL-MCNC: 7.3 G/DL (ref 6.8–8.8)
RBC # BLD AUTO: 3.83 10E6/UL (ref 3.8–5.2)
SODIUM SERPL-SCNC: 136 MMOL/L (ref 133–144)
TSH SERPL DL<=0.005 MIU/L-ACNC: 0.89 MU/L (ref 0.4–4)
WBC # BLD AUTO: 5.4 10E3/UL (ref 4–11)

## 2023-04-13 PROCEDURE — 84443 ASSAY THYROID STIM HORMONE: CPT | Performed by: NURSE PRACTITIONER

## 2023-04-13 PROCEDURE — 83036 HEMOGLOBIN GLYCOSYLATED A1C: CPT | Performed by: NURSE PRACTITIONER

## 2023-04-13 PROCEDURE — 80053 COMPREHEN METABOLIC PANEL: CPT | Performed by: NURSE PRACTITIONER

## 2023-04-13 PROCEDURE — 83690 ASSAY OF LIPASE: CPT | Performed by: NURSE PRACTITIONER

## 2023-04-13 PROCEDURE — 99214 OFFICE O/P EST MOD 30 MIN: CPT | Performed by: NURSE PRACTITIONER

## 2023-04-13 PROCEDURE — 85027 COMPLETE CBC AUTOMATED: CPT | Performed by: NURSE PRACTITIONER

## 2023-04-13 PROCEDURE — 36415 COLL VENOUS BLD VENIPUNCTURE: CPT | Performed by: NURSE PRACTITIONER

## 2023-04-13 RX ORDER — LOSARTAN POTASSIUM 50 MG/1
50 TABLET ORAL DAILY
Qty: 90 TABLET | Refills: 1 | Status: SHIPPED | OUTPATIENT
Start: 2023-04-13 | End: 2024-05-14

## 2023-04-13 RX ORDER — CYCLOBENZAPRINE HCL 10 MG
10 TABLET ORAL 3 TIMES DAILY PRN
Qty: 90 TABLET | Refills: 1 | Status: SHIPPED | OUTPATIENT
Start: 2023-04-13 | End: 2024-09-05

## 2023-04-13 RX ORDER — HYDROXYZINE HYDROCHLORIDE 25 MG/1
25-50 TABLET, FILM COATED ORAL
Qty: 60 TABLET | Refills: 1 | Status: SHIPPED | OUTPATIENT
Start: 2023-04-13

## 2023-04-13 RX ORDER — SUMATRIPTAN 50 MG/1
50 TABLET, FILM COATED ORAL
Qty: 20 TABLET | Refills: 1 | Status: SHIPPED | OUTPATIENT
Start: 2023-04-13

## 2023-04-13 RX ORDER — SUCRALFATE ORAL 1 G/10ML
1 SUSPENSION ORAL 4 TIMES DAILY PRN
Qty: 414 ML | Refills: 0 | Status: SHIPPED | OUTPATIENT
Start: 2023-04-13 | End: 2023-05-24

## 2023-04-13 ASSESSMENT — PAIN SCALES - GENERAL: PAINLEVEL: MILD PAIN (2)

## 2023-04-13 NOTE — PROGRESS NOTES
Assessment & Plan     Abdominal pain, generalized  Not improving. Labs so far are coming back negative. CT in the ER showed non-obstructing left renal stone, otherwise negative. Will trial some medication, check H.pylori, last colonoscopy was 2018 and normal. May need to consider EGD and/or colonoscopy if medications are not helping  - Comprehensive metabolic panel (BMP + Alb, Alk Phos, ALT, AST, Total. Bili, TP); Future  - CBC with platelets; Future  - Lipase; Future  - Hemoglobin A1c; Future  - TSH with free T4 reflex; Future  - Comprehensive metabolic panel (BMP + Alb, Alk Phos, ALT, AST, Total. Bili, TP)  - CBC with platelets  - Lipase  - Hemoglobin A1c  - TSH with free T4 reflex    Diarrhea, unspecified type  As above  - Helicobacter pylori Antigen Stool; Future  - C. difficile Toxin B PCR with reflex to C. difficile Antigen and Toxins A/B EIA; Future  - Enteric Bacteria and Virus Panel by SILVINA Stool; Future  - COLOGUARD([employer]); Future  - sucralfate (CARAFATE) 1 GM/10ML suspension; Take 10 mLs (1 g) by mouth 4 times daily as needed for nausea (abdominal pain)  - Helicobacter pylori Antigen Stool  - C. difficile Toxin B PCR with reflex to C. difficile Antigen and Toxins A/B EIA  - Enteric Bacteria and Virus Panel by SILVINA Stool    Spasm of muscle  Refill sent  - cyclobenzaprine (FLEXERIL) 10 MG tablet; Take 1 tablet (10 mg) by mouth 3 times daily as needed for muscle spasms    Insomnia, unspecified type  Trial for sleep  - hydrOXYzine (ATARAX) 25 MG tablet; Take 1-2 tablets (25-50 mg) by mouth nightly as needed for other (insomnia)    Essential hypertension  Refill sent  - losartan (COZAAR) 50 MG tablet; Take 1 tablet (50 mg) by mouth daily    History of DVT (deep vein thrombosis)  Is now on this  - rivaroxaban ANTICOAGULANT (XARELTO) 10 MG TABS tablet; Take by mouth daily (with dinner)    Migraine without aura and without status migrainosus, not intractable  Refill sent  - SUMAtriptan (IMITREX) 50 MG  "tablet; Take 1 tablet (50 mg) by mouth at onset of headache for migraine May repeat in 2 hours. Max 4 tablets/24 hours.    BMI:   Estimated body mass index is 33.27 kg/m  as calculated from the following:    Height as of this encounter: 1.702 m (5' 7.01\").    Weight as of this encounter: 96.4 kg (212 lb 8 oz).       MEENAKSHI Ureña, NP-C  [facility] ELIUD Betts is a 47 year old, presenting for the following health issues:  UC Follow-Up         View : No data to display.              [facility]       Hospital Follow-up Visit:    Hospital/Nursing Home/IP Rehab Facility: [facility]  Date of Admission: 3/3/23  Date of Discharge: 3/3/23  Reason(s) for Admission: Abdominal pain     Was your hospitalization related to COVID-19? No   Problems taking medications regularly:  None  Medication changes since discharge: None  Problems adhering to non-medication therapy:  None    Summary of hospitalization:  [facility] discharge summary reviewed  Diagnostic Tests/Treatments reviewed.  Follow up needed: none  Other Healthcare Providers Involved in Patient s Care:         None  Update since discharge: stable.         Plan of care communicated with patient              STOMACH ISSUES  Still having bad stomach pains and bloating. When she eats it then causes stomach pains, then 20 min later has grumbling and then has diarrhea. Tried gas-x and omeprazole and neither helped. Sometimes tarish color. Gray/black. This is daily occurrence since march 3rd. If she doesn't eat too much it isn't too bad but she feels hunger.  Urinating a lot, feels it is more than what she is drinking. Wakes up 3-4 times at night to urinate, that is new     Hx of colonoscopy in 2018 due to blood in stool: repeat 10 years    No longer has gall bladder for long time: normally would have some dumping after eating once a week.           Review of Systems   Constitutional, HEENT, cardiovascular, pulmonary, gi-as above " "and gu systems are negative, except as otherwise noted.      Objective    /85 (BP Location: Right arm, Patient Position: Sitting, Cuff Size: Adult Large)   Pulse 82   Temp 98.9  F (37.2  C) (Oral)   Resp 19   Ht 1.702 m (5' 7.01\")   Wt 96.4 kg (212 lb 8 oz)   SpO2 99%   BMI 33.27 kg/m    Body mass index is 33.27 kg/m .  Physical Exam   GENERAL: healthy, alert and no distress  EYES: Eyes grossly normal to inspection, PERRL and conjunctivae and sclerae normal  HENT: ear canals and TM's normal, nose and mouth without ulcers or lesions  NECK: no adenopathy, no asymmetry, masses, or scars and thyroid normal to palpation  RESP: lungs clear to auscultation - no rales, rhonchi or wheezes  CV: regular rate and rhythm, normal S1 S2, no S3 or S4, no murmur, click or rub  ABDOMEN: soft, tender throughout, no hepatosplenomegaly, no masses and bowel sounds hypoactive  MS: no gross musculoskeletal defects noted, no edema    Results for orders placed or performed in visit on 04/13/23 (from the past 24 hour(s))   Comprehensive metabolic panel (BMP + Alb, Alk Phos, ALT, AST, Total. Bili, TP)   Result Value Ref Range    Sodium 136 133 - 144 mmol/L    Potassium 4.7 3.4 - 5.3 mmol/L    Chloride 108 94 - 109 mmol/L    Carbon Dioxide (CO2) 26 20 - 32 mmol/L    Anion Gap 2 (L) 3 - 14 mmol/L    Urea Nitrogen 8 7 - 30 mg/dL    Creatinine 0.69 0.52 - 1.04 mg/dL    Calcium 9.1 8.5 - 10.1 mg/dL    Glucose 94 70 - 99 mg/dL    Alkaline Phosphatase 65 40 - 150 U/L    AST 18 0 - 45 U/L    ALT 21 0 - 50 U/L    Protein Total 7.3 6.8 - 8.8 g/dL    Albumin 3.8 3.4 - 5.0 g/dL    Bilirubin Total 0.4 0.2 - 1.3 mg/dL    GFR Estimate >90 >60 mL/min/1.73m2   CBC with platelets   Result Value Ref Range    WBC Count 5.4 4.0 - 11.0 10e3/uL    RBC Count 3.83 3.80 - 5.20 10e6/uL    Hemoglobin 11.8 11.7 - 15.7 g/dL    Hematocrit 35.2 35.0 - 47.0 %    MCV 92 78 - 100 fL    MCH 30.8 26.5 - 33.0 pg    MCHC 33.5 31.5 - 36.5 g/dL    RDW 14.5 10.0 - 15.0 % "    Platelet Count 338 150 - 450 10e3/uL   Lipase   Result Value Ref Range    Lipase 70 (L) 73 - 393 U/L   Hemoglobin A1c   Result Value Ref Range    Hemoglobin A1C 5.0 0.0 - 5.6 %   TSH with free T4 reflex   Result Value Ref Range    TSH 0.89 0.40 - 4.00 mU/L

## 2023-04-13 NOTE — PATIENT INSTRUCTIONS
Start the following AFTER you collect the stool samples that we give you to collect today (this does not include the cologuard that is being mailed to you)    Try carafate first for a few days (3-4 days)  If NOT helping, trial imodium 2 mg twice a day, morning and afternoon for a few days  If that isn't helping, we can then trial reglan 2-3 times a day

## 2023-04-14 NOTE — RESULT ENCOUNTER NOTE
En Betts,   None of the labs are giving me a reason for your abdominal pain. Keep me posted early next week if the carafate is working or not. Make sure to get a virtual visit with another provider in 2 weeks as follow up.   Thank you,  MEENAKSHI Ureña, NP-C  [facility]

## 2023-04-16 PROCEDURE — 87338 HPYLORI STOOL AG IA: CPT | Performed by: NURSE PRACTITIONER

## 2023-04-16 PROCEDURE — 87506 IADNA-DNA/RNA PROBE TQ 6-11: CPT | Performed by: NURSE PRACTITIONER

## 2023-04-16 PROCEDURE — 87493 C DIFF AMPLIFIED PROBE: CPT | Mod: 59 | Performed by: NURSE PRACTITIONER

## 2023-04-17 LAB
C COLI+JEJUNI+LARI FUSA STL QL NAA+PROBE: NOT DETECTED
C DIFF TOX B STL QL: NEGATIVE
EC STX1 GENE STL QL NAA+PROBE: NOT DETECTED
EC STX2 GENE STL QL NAA+PROBE: NOT DETECTED
NOROV GI+II ORF1-ORF2 JNC STL QL NAA+PR: NOT DETECTED
RVA NSP5 STL QL NAA+PROBE: NOT DETECTED
SALMONELLA SP RPOD STL QL NAA+PROBE: NOT DETECTED
SHIGELLA SP+EIEC IPAH STL QL NAA+PROBE: NOT DETECTED
V CHOL+PARA RFBL+TRKH+TNAA STL QL NAA+PR: NOT DETECTED
Y ENTERO RECN STL QL NAA+PROBE: NOT DETECTED

## 2023-04-18 LAB — H PYLORI AG STL QL IA: NEGATIVE

## 2023-04-18 NOTE — RESULT ENCOUNTER NOTE
En Betts,   Your stool samples are negative. How are things feeling this week?  Thank you,  MEENAKSHI Ureña, NP-C  [facility]

## 2023-04-22 ENCOUNTER — HEALTH MAINTENANCE LETTER (OUTPATIENT)
Age: 48
End: 2023-04-22

## 2023-05-02 ENCOUNTER — ANCILLARY PROCEDURE (OUTPATIENT)
Dept: CT IMAGING | Facility: CLINIC | Age: 48
End: 2023-05-02
Attending: NURSE PRACTITIONER
Payer: COMMERCIAL

## 2023-05-02 DIAGNOSIS — R10.84 ABDOMINAL PAIN, GENERALIZED: ICD-10-CM

## 2023-05-02 DIAGNOSIS — R10.9 FLANK PAIN: ICD-10-CM

## 2023-05-02 DIAGNOSIS — Z87.442 HISTORY OF KIDNEY STONES: ICD-10-CM

## 2023-05-02 PROCEDURE — 74176 CT ABD & PELVIS W/O CONTRAST: CPT | Performed by: RADIOLOGY

## 2023-05-03 DIAGNOSIS — N20.0 KIDNEY STONE: Primary | ICD-10-CM

## 2023-05-03 NOTE — RESULT ENCOUNTER NOTE
Dear Roxane  As you may or may not be aware Trupti is no longer working at [facility]  You have small kidney stone in your left kidney.  Follow up with urology as soon as able.  Return urgently if any change in symptoms like increasing pain, vomiting, fever or other change in symptoms.   Please call or MyChart my office with any questions or concerns.   Elena Finney, PAC

## 2023-05-11 DIAGNOSIS — R10.84 ABDOMINAL PAIN, GENERALIZED: ICD-10-CM

## 2023-05-11 DIAGNOSIS — R10.9 FLANK PAIN: ICD-10-CM

## 2023-05-11 DIAGNOSIS — Z87.442 HISTORY OF KIDNEY STONES: ICD-10-CM

## 2023-05-11 RX ORDER — TAMSULOSIN HYDROCHLORIDE 0.4 MG/1
0.4 CAPSULE ORAL DAILY
Qty: 30 CAPSULE | Refills: 0 | Status: SHIPPED | OUTPATIENT
Start: 2023-05-11 | End: 2023-05-24

## 2023-05-19 ENCOUNTER — MYC MEDICAL ADVICE (OUTPATIENT)
Dept: FAMILY MEDICINE | Facility: CLINIC | Age: 48
End: 2023-05-19
Payer: COMMERCIAL

## 2023-05-19 NOTE — TELEPHONE ENCOUNTER
RN called patient to further triage and relay provider message below.    Patient states her symptoms has been occurring since March. Reports that her pain has gotten worse on the sides when she's urinating, as well as having abdominal pain. Patient also reports having diarrhea every time 15 minutes after eating. Patient reports that her pain has been so severe that sometimes she can take up to 8-10 tablets of Ibuprofen 3-4x/day, just to manage her pain. She reports that Tamulosin is not helping and she stopped Sucralfate due to side effects.     RN highly advised that patient be seen in ED for further evaluation for pain management, concerns of dehydration, and more work up. Patient was hesitant on going as the last time she had went to  and ED they were unable to help her. RN still advised on going back to the ED as there were no openings in clinic to be seen today. Patient verbalized understanding.     RN also assisted in scheduling appointment with provider on 5/24 to follow-up on symptoms.     BORA Sorto  [facility] Primary Care Triage

## 2023-05-19 NOTE — TELEPHONE ENCOUNTER
Patient has been seen at , ED, and had OV with provider regarding symptoms. Most recent CT scan showing small kidney stone in left kidney.     Routing to provider to review and advise if ADS or ED more appropriate.    BORA Sorto  [facility] Primary Care Triage

## 2023-05-24 ENCOUNTER — MYC MEDICAL ADVICE (OUTPATIENT)
Dept: FAMILY MEDICINE | Facility: CLINIC | Age: 48
End: 2023-05-24

## 2023-05-24 ENCOUNTER — OFFICE VISIT (OUTPATIENT)
Dept: FAMILY MEDICINE | Facility: CLINIC | Age: 48
End: 2023-05-24
Payer: COMMERCIAL

## 2023-05-24 VITALS
TEMPERATURE: 98.5 F | WEIGHT: 206 LBS | HEIGHT: 66 IN | HEART RATE: 99 BPM | DIASTOLIC BLOOD PRESSURE: 85 MMHG | BODY MASS INDEX: 33.11 KG/M2 | SYSTOLIC BLOOD PRESSURE: 127 MMHG | RESPIRATION RATE: 10 BRPM | OXYGEN SATURATION: 100 %

## 2023-05-24 DIAGNOSIS — R19.7 DIARRHEA, UNSPECIFIED TYPE: ICD-10-CM

## 2023-05-24 DIAGNOSIS — G89.29 CHRONIC RIGHT-SIDED LOW BACK PAIN WITHOUT SCIATICA: Primary | ICD-10-CM

## 2023-05-24 DIAGNOSIS — M54.50 CHRONIC RIGHT-SIDED LOW BACK PAIN WITHOUT SCIATICA: Primary | ICD-10-CM

## 2023-05-24 DIAGNOSIS — Z12.31 VISIT FOR SCREENING MAMMOGRAM: ICD-10-CM

## 2023-05-24 PROCEDURE — 99214 OFFICE O/P EST MOD 30 MIN: CPT | Performed by: INTERNAL MEDICINE

## 2023-05-24 NOTE — PROGRESS NOTES
Assessment & Plan     Chronic right-sided low back pain without sciatica  She is complaining of pain in her back  Pain is in the right side  It is just above her right buttock  She did have an MRI in 2022 which showed multilevel degenerative changes in the lumbar spine greatest at L4-L5 and there is also some moderate lateral recess stenosis  On examination today her straight leg raising test is negative however she is tender on palpation of the lower back  She was wondering if this pain was from her kidney stone however the kidney stone is actually on the left side and it is only 3 mm and in the left  lower renal pole so I do not think this is the cause of the pain  Unfortunately I cannot use Celebrex or NSAIDs in her case because she is on Xarelto  She is currently not taking it  I asked her to check with her hematology team if she can come off this permanently because she only had 1 episode of DVT and if she is not going to be on this I can certainly send her some Celebrex  - Spine  Referral; Future    Visit for screening mammogram  She has had no mammogram for the last few years  - MA SCREENING DIGITAL BILAT - Future  (s+30); Future    Diarrhea, unspecified type  Complaining of diarrhea since March  Happens whenever she eats food  She has watery stools and has to go to the bathroom after eating food  He does come to a point now where she only eats small amount of food will avoid diarrhea  No abdominal pain  Up-to-date with colonoscopy  No recent new medications  She probably has exaggerated gastrocolic reflex  We can certainly try something like amitriptyline or other anticholinergic agents to slow down the bowel  She would like to get a GI opinion  - Adult GI  Referral - Consult Only; Future      30 minutes spent by me on the date of the encounter doing chart review, history and exam, documentation and further activities per the note       BMI:   Estimated body mass index is 33.25 kg/m  as  "calculated from the following:    Height as of this encounter: 1.676 m (5' 6\").    Weight as of this encounter: 93.4 kg (206 lb).   Weight management plan: Discussed healthy diet and exercise guidelines        Mitul Pennington MD  [facility] ELIUD Betts is a 48 year old, presenting for the following health issues:  Abdominal Pain and Back Pain         View : No data to display.              HPI     Concern - Pain in abdominal and back  Onset: March of 2023  Description: sharp pain   Intensity: moderate  Progression of Symptoms:  worsening  Accompanying Signs & Symptoms: None  Previous history of similar problem: None  Precipitating factors:        Worsened by: Laying on back   Alleviating factors:        Improved by: None  Therapies tried and outcome: Sucralfate and tamsulosin, not helpful          Review of Systems   Constitutional, HEENT, cardiovascular, pulmonary, gi and gu systems are negative, except as otherwise noted.      Objective    /85 (BP Location: Right arm, Patient Position: Sitting, Cuff Size: Adult Regular)   Pulse 99   Temp 98.5  F (36.9  C) (Oral)   Resp 10   Ht 1.676 m (5' 6\")   Wt 93.4 kg (206 lb)   LMP 05/03/2023 (Approximate)   SpO2 100%   BMI 33.25 kg/m    Body mass index is 33.25 kg/m .  Physical Exam   GENERAL: healthy, alert and no distress  NECK: no adenopathy, no asymmetry, masses, or scars and thyroid normal to palpation  RESP: lungs clear to auscultation - no rales, rhonchi or wheezes  CV: regular rate and rhythm, normal S1 S2, no S3 or S4, no murmur, click or rub, no peripheral edema and peripheral pulses strong  ABDOMEN: soft, nontender, no hepatosplenomegaly, no masses and bowel sounds normal  MS: Tender on palpation of the lower back in the L4-L5 region and also she has some paraspinal muscle tenderness in that area towards the right side  Slight leg raising test is negative on that side                    "

## 2023-05-25 RX ORDER — CELECOXIB 100 MG/1
100 CAPSULE ORAL 2 TIMES DAILY
Qty: 60 CAPSULE | Refills: 1 | Status: SHIPPED | OUTPATIENT
Start: 2023-05-25

## 2023-05-30 NOTE — TELEPHONE ENCOUNTER
SPINE PATIENTS - NEW PROTOCOL PREVISIT    RECORDS RECEIVED FROM: Internal   REASON FOR VISIT: Chronic right-sided low back pain without sciatica   Date of Appt: 07/28/2023   NOTES (FOR ALL VISITS) STATUS DETAILS   OFFICE NOTE from referring provider Internal 05/24/2023 Dr Pennington [facility]    OFFICE NOTE from other specialist Internal 05/12/2022 Dr García [facility]    DISCHARGE SUMMARY from hospital N/A    DISCHARGE REPORT from ER N/A    OPERATIVE REPORT N/A    EMG REPORT N/A    MEDICATION LIST N/A    IMAGING  (FOR ALL VISITS)     MRI (HEAD, NECK, SPINE) Internal 04/18/2022 lumbar spine   XRAY (SPINE) *NEUROSURGERY* N/A    CT (HEAD, NECK, SPINE) N/A

## 2023-06-15 NOTE — TELEPHONE ENCOUNTER
REFERRAL INFORMATION:    Referring Provider:  Mitul Pennington MD    Referring Clinic:  [facility]    Reason for Visit/Diagnosis: Diarrhea, unspecified type     FUTURE VISIT INFORMATION:    Appointment Date: 9/5/2023     NOTES STATUS DETAILS   OFFICE NOTE from Referring Provider Internal 5/24/2023 OV with Aditi   OFFICE NOTE from Other Specialist Internal 4/13/2023 OV with AKOSUA Au   HOSPITAL DISCHARGE SUMMARY/  ED VISITS Care Everywhere 3/3/2023 [facility] ED   OPERATIVE REPORT N/A    MEDICATION LIST Internal         ENDOSCOPY  N/A    COLONOSCOPY Care Everywhere 2/12/2018 - [facility]    ERCP N/A    EUS N/A    STOOL TESTING N/A    PERTINENT LABS N/A    PATHOLOGY REPORTS (RELATED) N/A    IMAGING (CT, MRI, EGD, MRCP, Small Bowel Follow Through/SBT, MR/CT Enterography) Received  5/2/2023 CT ABD PEL    [facility]:   3/3/2023 CT ABD PEL     Request sent to [facility] for imaging fax [phone] - IN PACS

## 2023-07-28 ENCOUNTER — OFFICE VISIT (OUTPATIENT)
Dept: NEUROSURGERY | Facility: CLINIC | Age: 48
End: 2023-07-28
Attending: INTERNAL MEDICINE
Payer: COMMERCIAL

## 2023-07-28 ENCOUNTER — PRE VISIT (OUTPATIENT)
Dept: NEUROSURGERY | Facility: CLINIC | Age: 48
End: 2023-07-28

## 2023-07-28 VITALS
DIASTOLIC BLOOD PRESSURE: 90 MMHG | WEIGHT: 206 LBS | SYSTOLIC BLOOD PRESSURE: 135 MMHG | BODY MASS INDEX: 33.11 KG/M2 | HEIGHT: 66 IN | HEART RATE: 88 BPM

## 2023-07-28 DIAGNOSIS — G89.29 CHRONIC RIGHT-SIDED LOW BACK PAIN WITHOUT SCIATICA: ICD-10-CM

## 2023-07-28 DIAGNOSIS — M54.50 CHRONIC RIGHT-SIDED LOW BACK PAIN WITHOUT SCIATICA: ICD-10-CM

## 2023-07-28 PROCEDURE — 99204 OFFICE O/P NEW MOD 45 MIN: CPT | Performed by: PHYSICIAN ASSISTANT

## 2023-07-28 ASSESSMENT — PAIN SCALES - GENERAL: PAINLEVEL: MODERATE PAIN (4)

## 2023-07-28 NOTE — PROGRESS NOTES
Neurosurgery Consult    HPI    Ms. Lopes is a 48-year-old female who presents to clinic for evaluation of back pain and left lateral thigh pain.  She states when she externally rotates her hip she gets lateral thigh pain.  She reports her back pain has become worse over the past several months, and is becoming a daily bother for her.  She had a lumbar MRI in 2022 in April which demonstrated degenerative changes at L4-5, with moderate lateral recess stenosis, and mild lateral recess stenosis at L5-S1 as well as mild to moderate bilateral neuroforaminal stenosis at L5-S1.  She has not tried physical therapy or any other conservative therapies.    Medical history  Obesity  Hypertension  Tobacco use  PTSD      Social history  Works as a pharmacy technician      B/P: 135/90, T: Data Unavailable, P: 88, R: Data Unavailable       Exam    Alert and oriented no acute distress  Bilateral lower extremities with 5/5 strength  Reflexes 2+ patella/ankle  Negative straight leg raise bilaterally  Negative ankle clonus negative Babinski bilaterally  Lumbar spine nontender to palpation  Able to stand on heels and toes  Gait is normal    Pain with internal and external rotation of the left hip    Imaging    See HPI    Assessment    Back pain  Left hip pain    Plan:      I recommend the patient begin a trial of physical therapy for back pain and hip pain, and follow-up as needed if she is not improving.  If she is not improving we might consider a lumbar epidural steroid injection, or possibly imaging of her hip and possible hip injection if indicated.

## 2023-07-28 NOTE — NURSING NOTE
"Roxane Lopes's goals for this visit include:   Chief Complaint   Patient presents with    New Patient     Chronic right-sided low back pain without sciatica, ref Dr Pennington, [facility], no  Imaging// left sided sciatic pain           She requests these members of her care team be copied on today's visit information: yes    PCP: System, Provider Not In    Referring Provider:  Mitul Pennington MD  [address] [place] [address]    BP (!) 135/90   Pulse 88   Ht 1.676 m (5' 6\")   Wt 93.4 kg (206 lb)   BMI 33.25 kg/m      Do you need any medication refills at today's visit? No  FREDRICK Pitts, CMA ([facility])      "

## 2023-07-28 NOTE — LETTER
7/28/2023         RE: Roxane Lopes  [address] [place] [id number]        Dear Colleague,    Thank you for referring your patient, Roxane Lopes, to the [facility] NEUROS[facility]GERY [facility]INIC [place]. Please see a copy of my visit note below.    Neurosurgery Consult    HPI    Ms. Lopes is a 48-year-old female who presents to clinic for evaluation of back pain and left lateral thigh pain.  She states when she externally rotates her hip she gets lateral thigh pain.  She reports her back pain has become worse over the past several months, and is becoming a daily bother for her.  She had a lumbar MRI in 2022 in April which demonstrated degenerative changes at L4-5, with moderate lateral recess stenosis, and mild lateral recess stenosis at L5-S1 as well as mild to moderate bilateral neuroforaminal stenosis at L5-S1.  She has not tried physical therapy or any other conservative therapies.    Medical history  Obesity  Hypertension  Tobacco use  PTSD      Social history  Works as a pharmacy technician      B/P: 135/90, T: Data Unavailable, P: 88, R: Data Unavailable       Exam    Alert and oriented no acute distress  Bilateral lower extremities with 5/5 strength  Reflexes 2+ patella/ankle  Negative straight leg raise bilaterally  Negative ankle clonus negative Babinski bilaterally  Lumbar spine nontender to palpation  Able to stand on heels and toes  Gait is normal    Pain with internal and external rotation of the left hip    Imaging    See HPI    Assessment    Back pain  Left hip pain    Plan:      I recommend the patient begin a trial of physical therapy for back pain and hip pain, and follow-up as needed if she is not improving.  If she is not improving we might consider a lumbar epidural steroid injection, or possibly imaging of her hip and possible hip injection if indicated.      Again, thank you for allowing me to participate in the care of your patient.        Sincerely,        Nakul SWEENEY  DA[facility] Enciso

## 2023-08-23 ENCOUNTER — ANCILLARY PROCEDURE (OUTPATIENT)
Dept: MAMMOGRAPHY | Facility: CLINIC | Age: 48
End: 2023-08-23
Attending: INTERNAL MEDICINE
Payer: COMMERCIAL

## 2023-08-23 ENCOUNTER — ANCILLARY ORDERS (OUTPATIENT)
Dept: FAMILY MEDICINE | Facility: CLINIC | Age: 48
End: 2023-08-23

## 2023-08-23 DIAGNOSIS — M54.50 CHRONIC RIGHT-SIDED LOW BACK PAIN WITHOUT SCIATICA: Primary | ICD-10-CM

## 2023-08-23 DIAGNOSIS — R19.7 DIARRHEA, UNSPECIFIED TYPE: ICD-10-CM

## 2023-08-23 DIAGNOSIS — Z12.31 VISIT FOR SCREENING MAMMOGRAM: ICD-10-CM

## 2023-08-23 DIAGNOSIS — G89.29 CHRONIC RIGHT-SIDED LOW BACK PAIN WITHOUT SCIATICA: Primary | ICD-10-CM

## 2023-08-23 PROCEDURE — 77067 SCR MAMMO BI INCL CAD: CPT | Mod: GC | Performed by: RADIOLOGY

## 2023-08-23 PROCEDURE — 77063 BREAST TOMOSYNTHESIS BI: CPT | Mod: GC | Performed by: RADIOLOGY

## 2023-09-05 ENCOUNTER — PRE VISIT (OUTPATIENT)
Dept: GASTROENTEROLOGY | Facility: CLINIC | Age: 48
End: 2023-09-05
Payer: COMMERCIAL

## 2023-09-05 ENCOUNTER — OFFICE VISIT (OUTPATIENT)
Dept: GASTROENTEROLOGY | Facility: CLINIC | Age: 48
End: 2023-09-05
Attending: INTERNAL MEDICINE
Payer: COMMERCIAL

## 2023-09-05 VITALS
DIASTOLIC BLOOD PRESSURE: 85 MMHG | BODY MASS INDEX: 30.4 KG/M2 | OXYGEN SATURATION: 100 % | WEIGHT: 193.7 LBS | HEIGHT: 67 IN | HEART RATE: 97 BPM | SYSTOLIC BLOOD PRESSURE: 124 MMHG

## 2023-09-05 DIAGNOSIS — R19.7 DIARRHEA, UNSPECIFIED TYPE: ICD-10-CM

## 2023-09-05 DIAGNOSIS — R10.9 ABDOMINAL CRAMPING: Primary | ICD-10-CM

## 2023-09-05 DIAGNOSIS — R10.9 ABDOMINAL CRAMPING: ICD-10-CM

## 2023-09-05 DIAGNOSIS — R63.4 UNINTENTIONAL WEIGHT LOSS: ICD-10-CM

## 2023-09-05 LAB
ALBUMIN SERPL BCG-MCNC: 4.5 G/DL (ref 3.5–5.2)
ALP SERPL-CCNC: 70 U/L (ref 35–104)
ALT SERPL W P-5'-P-CCNC: <5 U/L (ref 0–50)
ANION GAP SERPL CALCULATED.3IONS-SCNC: 12 MMOL/L (ref 7–15)
AST SERPL W P-5'-P-CCNC: 16 U/L (ref 0–45)
BASOPHILS # BLD AUTO: 0.1 10E3/UL (ref 0–0.2)
BASOPHILS NFR BLD AUTO: 1 %
BILIRUB SERPL-MCNC: 0.6 MG/DL
BUN SERPL-MCNC: 9.3 MG/DL (ref 6–20)
CALCIUM SERPL-MCNC: 9.7 MG/DL (ref 8.6–10)
CHLORIDE SERPL-SCNC: 104 MMOL/L (ref 98–107)
CREAT SERPL-MCNC: 0.69 MG/DL (ref 0.51–0.95)
CRP SERPL-MCNC: <3 MG/L
DEPRECATED HCO3 PLAS-SCNC: 20 MMOL/L (ref 22–29)
EOSINOPHIL # BLD AUTO: 0.1 10E3/UL (ref 0–0.7)
EOSINOPHIL NFR BLD AUTO: 2 %
ERYTHROCYTE [DISTWIDTH] IN BLOOD BY AUTOMATED COUNT: 13.9 % (ref 10–15)
ERYTHROCYTE [SEDIMENTATION RATE] IN BLOOD BY WESTERGREN METHOD: 2 MM/HR (ref 0–20)
GFR SERPL CREATININE-BSD FRML MDRD: >90 ML/MIN/1.73M2
GLUCOSE SERPL-MCNC: 101 MG/DL (ref 70–99)
HCT VFR BLD AUTO: 39.4 % (ref 35–47)
HGB BLD-MCNC: 13.5 G/DL (ref 11.7–15.7)
IMM GRANULOCYTES # BLD: 0 10E3/UL
IMM GRANULOCYTES NFR BLD: 0 %
LIPASE SERPL-CCNC: 17 U/L (ref 13–60)
LYMPHOCYTES # BLD AUTO: 1.4 10E3/UL (ref 0.8–5.3)
LYMPHOCYTES NFR BLD AUTO: 22 %
MCH RBC QN AUTO: 30.2 PG (ref 26.5–33)
MCHC RBC AUTO-ENTMCNC: 34.3 G/DL (ref 31.5–36.5)
MCV RBC AUTO: 88 FL (ref 78–100)
MONOCYTES # BLD AUTO: 0.5 10E3/UL (ref 0–1.3)
MONOCYTES NFR BLD AUTO: 7 %
NEUTROPHILS # BLD AUTO: 4.4 10E3/UL (ref 1.6–8.3)
NEUTROPHILS NFR BLD AUTO: 68 %
NRBC # BLD AUTO: 0 10E3/UL
NRBC BLD AUTO-RTO: 0 /100
PLATELET # BLD AUTO: 302 10E3/UL (ref 150–450)
POTASSIUM SERPL-SCNC: 4.2 MMOL/L (ref 3.4–5.3)
PROT SERPL-MCNC: 7.4 G/DL (ref 6.4–8.3)
RBC # BLD AUTO: 4.47 10E6/UL (ref 3.8–5.2)
SODIUM SERPL-SCNC: 136 MMOL/L (ref 136–145)
TSH SERPL DL<=0.005 MIU/L-ACNC: 1.1 UIU/ML (ref 0.3–4.2)
WBC # BLD AUTO: 6.4 10E3/UL (ref 4–11)

## 2023-09-05 PROCEDURE — 99204 OFFICE O/P NEW MOD 45 MIN: CPT | Performed by: PHYSICIAN ASSISTANT

## 2023-09-05 PROCEDURE — 85652 RBC SED RATE AUTOMATED: CPT | Performed by: PHYSICIAN ASSISTANT

## 2023-09-05 PROCEDURE — 86140 C-REACTIVE PROTEIN: CPT | Performed by: PHYSICIAN ASSISTANT

## 2023-09-05 PROCEDURE — 86364 TISS TRNSGLTMNASE EA IG CLAS: CPT | Performed by: PHYSICIAN ASSISTANT

## 2023-09-05 PROCEDURE — 82784 ASSAY IGA/IGD/IGG/IGM EACH: CPT | Performed by: PHYSICIAN ASSISTANT

## 2023-09-05 PROCEDURE — 85025 COMPLETE CBC W/AUTO DIFF WBC: CPT | Performed by: PHYSICIAN ASSISTANT

## 2023-09-05 PROCEDURE — 83690 ASSAY OF LIPASE: CPT | Performed by: PHYSICIAN ASSISTANT

## 2023-09-05 PROCEDURE — 36415 COLL VENOUS BLD VENIPUNCTURE: CPT | Performed by: PHYSICIAN ASSISTANT

## 2023-09-05 PROCEDURE — 84443 ASSAY THYROID STIM HORMONE: CPT | Performed by: PHYSICIAN ASSISTANT

## 2023-09-05 PROCEDURE — 80053 COMPREHEN METABOLIC PANEL: CPT | Performed by: PHYSICIAN ASSISTANT

## 2023-09-05 RX ORDER — CHLORHEXIDINE GLUCONATE ORAL RINSE 1.2 MG/ML
SOLUTION DENTAL
COMMUNITY
Start: 2023-07-24

## 2023-09-05 ASSESSMENT — PAIN SCALES - GENERAL: PAINLEVEL: MILD PAIN (2)

## 2023-09-05 NOTE — PATIENT INSTRUCTIONS
It was nice to meet you today.  As we discussed, we are going to do some blood tests and stool test to evaluate for some inflammatory bowel diseases, malabsorptive diseases, and rule out an infection called Giardia.  I will be in touch when I see the results of all your tests.  Some additional tests might include a CT scan of your abdomen pelvis, this time with contrast, and potentially upper endoscopy and colonoscopy.

## 2023-09-05 NOTE — LETTER
9/5/2023         RE: Roxane Lopes  [address] [place] [id number]        Dear Colleague,    Thank you for referring your patient, Roxane Lopes, to the [facility]. Please see a copy of my visit note below.    NEW PATIENT GI CLINIC VISIT    CC/REFERRING MD:  Mitul Pennington  REASON FOR CONSULTATION:   Mitul Pennington for   Chief Complaint   Patient presents with     New Patient     New consult for diarrhea after eating, and abdominal pain.       ASSESSMENT/PLAN: Patient is here for evaluation of roughly 5 months of postprandial abdominal cramping and diarrhea of unclear cause.  We spent some time reviewing her work-up thus far, which included normal laboratory and stool testing from April of this year as well as noncontrast CT of the abdomen pelvis x2.  Reviewed potential etiologies, which could include infectious cause such as Giardia, malabsorptive cause such as celiac disease, EPI, other carbohydrate malabsorption, as well as IBD.  With unintentional weight loss, more sinister underlying cause such as malignancy should be considered as well.  We will start with laboratory work-up as detailed below.  We will likely plan on contrast-enhanced CT of the chest, abdomen, and pelvis, and consider colonoscopy as well.  We will see her labs first and then we will discuss medication therapies, which could include antispasmodic such as Bentyl or cholestyramine, given her history of cholecystectomy.  Patient stated understanding of plan and I will contact her as soon as labs are in.    Thank you for this consultation.  It was a pleasure to participate in the care of this patient; please contact us with any further questions.      25 minutes spent on the date of the encounter doing chart review, patient visit, and documentation    This note was created with voice recognition software, and while reviewed for accuracy, typos may remain.     Rg Alcantara PA-C  Division of  Gastroenterology, Hepatology and Nutrition  [facility]      HPI  Roxane Lopes is a 48 year old female with a past medical history significant for migraines, hypertension, and DVT on chronic anticoagulation with Xarelto that is seen as a new patient in the GI clinic today for postprandial abdominal cramping and diarrhea.  This problem started in March.  Prior to that, she had had symptoms like this intermittently, but it has been essentially consistent since March.  She describes having bilateral lower abdominal cramping with passage of [place] stool chart 6 or 7 stools after meals.  There has been no obvious dietary trigger, it is occurred with essentially all foods.  Because of this, she is actually really limited her oral intake, may only have 1 meal a day.  She has tried using Imodium, but this seemed to make her cramping worse.  She has not seen any blood in the stool.  There has been no associated nausea, vomiting, upper abdominal pain, early satiety, or heartburn.  She did have evaluation in April with primary care provider, had normal CBC, CMP, lipase, TSH, and had negative enteric pathogen's stool testing and negative C. difficile.  She did have a couple of noncontrast CTs, notable for left kidney stone, no other obvious abnormalities.  She did have negative Cologuard done as well.  Most recent colonoscopy was 2018, this was done for rectal bleeding.  Noted to have both internal and external hemorrhoids, otherwise no polyps, sigmoid diverticulosis noted.  No pertinent family medical history of GI diseases.  Surgical history is pertinent for cholecystectomy and tubal ligation.  The patient has had a roughly 25 pound weight loss since March, she thinks this is probably due to limited oral intake.    ROS:    10 point ROS neg other than the symptoms noted above in the HPI.    PREVIOUS ENDOSCOPY:  Reviewed, see HPI    PERTINENT RELEVANT IMAGING OR  LABS:  Reviewed    ALLERGIES:     Allergies   Allergen Reactions     Chantix [Varenicline Tartrate] Anxiety       PERTINENT MEDICATIONS:    Current Outpatient Medications:      celecoxib (CELEBREX) 100 MG capsule, Take 1 capsule (100 mg) by mouth 2 times daily, Disp: 60 capsule, Rfl: 1     chlorhexidine (PERIDEX) 0.12 % solution, rinse mouth with 1/2 oz 2x a day, once in morning and once at night. spit after rinsing, do not swallow*, Disp: , Rfl:      cyclobenzaprine (FLEXERIL) 10 MG tablet, Take 1 tablet (10 mg) by mouth 3 times daily as needed for muscle spasms, Disp: 90 tablet, Rfl: 1     hydrOXYzine (ATARAX) 25 MG tablet, Take 1-2 tablets (25-50 mg) by mouth nightly as needed for other (insomnia), Disp: 60 tablet, Rfl: 1     losartan (COZAAR) 50 MG tablet, Take 1 tablet (50 mg) by mouth daily, Disp: 90 tablet, Rfl: 1     rivaroxaban ANTICOAGULANT (XARELTO) 10 MG TABS tablet, Take by mouth daily (with dinner) (Patient not taking: Reported on 9/5/2023), Disp: , Rfl:      SUMAtriptan (IMITREX) 50 MG tablet, Take 1 tablet (50 mg) by mouth at onset of headache for migraine May repeat in 2 hours. Max 4 tablets/24 hours. (Patient not taking: Reported on 9/5/2023), Disp: 20 tablet, Rfl: 1    PROBLEM LIST  Patient Active Problem List    Diagnosis Date Noted     Morbid obesity (H) 12/20/2021     Priority: Medium     History of DVT (deep vein thrombosis) 12/20/2021     Priority: Medium     Spasm of muscle 12/20/2021     Priority: Medium     Essential hypertension 12/20/2021     Priority: Medium     Migraine without aura and without status migrainosus, not intractable 12/20/2021     Priority: Medium     Class 1 obesity due to excess calories with serious comorbidity and body mass index (BMI) of 33.0 to 33.9 in adult 11/05/2020     Priority: Medium     Galactorrhea of left breast 10/16/2014     Priority: Medium     Generalized hyperhidrosis 10/16/2014     Priority: Medium     Fatigue 10/16/2014     Priority: Medium      CARDIOVASCULAR SCREENING; LDL GOAL LESS THAN 160 10/16/2014     Priority: Medium     Depression with anxiety 10/16/2014     Priority: Medium     Gastroesophageal reflux disease without esophagitis 06/11/2013     Priority: Medium     Anxiety 08/15/2012     Priority: Medium     Thumb injury 05/22/2012     Priority: Medium     Prediabetes 03/23/2012     Priority: Medium     Venous insufficiency of leg-left 02/03/2012     Priority: Medium     PTSD (post-traumatic stress disorder) 09/07/2011     Priority: Medium     Tobacco use disorder 09/07/2011     Priority: Medium     Varicose veins 08/03/2011     Priority: Medium     Left leg pain 08/03/2011     Priority: Medium       PERTINENT PAST MEDICAL HISTORY:  Past Medical History:   Diagnosis Date     Benign essential hypertension      Depression      DVT (deep venous thrombosis) (H) 04/2011    left leg, was on lovenox and coumadin but have been off since November 2011     Essential hypertension 12/20/2021     H/O tubal ligation     2006      Obesity      Post-traumatic osteoarthritis of right shoulder 5/5/2022     Prediabetes        PREVIOUS SURGERIES:  Past Surgical History:   Procedure Laterality Date     EXAM UNDER ANESTHESIA ANUS N/A 4/30/2021    Procedure: EXAM UNDER ANESTHESIA;  Surgeon: Leandro Charles MD;  Location: [facility] [place]     EXCISE LESION RECTUM N/A 4/30/2021    Procedure: Excision of coccygeal cyst;  Surgeon: Leandro Charles MD;  Location: [facility] [place]     LAPAROSCOPIC CHOLECYSTECTOMY  10/9/2012    Procedure: LAPAROSCOPIC CHOLECYSTECTOMY;  Laparoscopic Cholecystectomy;  Surgeon: Martell Briscoe MD;  Location: [facility] OR     LAPAROSCOPIC TUBAL LIGATION       PROCTOSCOPY N/A 4/30/2021    Procedure: Proctoscopy;  Surgeon: Leandro Charles MD;  Location: [facility] [place]       SOCIAL HISTORY:  Social History     Socioeconomic History     Marital status:      Spouse name: Not on file     Number of children: 4     Years of education: Not on file     Highest education level: Not  on file   Occupational History     Occupation: pharmacy tech   Tobacco Use     Smoking status: Former     Packs/day: 0.50     Years: 10.00     Pack years: 5.00     Types: Cigarettes, Other     Quit date: 10/16/2012     Years since quitting: 10.8     Passive exposure: Past     Smokeless tobacco: Never     Tobacco comments:     Vaping now    Vaping Use     Vaping Use: Every day     Substances: Nicotine, Flavoring   Substance and Sexual Activity     Alcohol use: Yes     Comment: Maybe once a year     Drug use: No     Sexual activity: Yes     Partners: Male     Birth control/protection: Surgical     Comment:  one partner   Other Topics Concern     Parent/sibling w/ CABG, MI or angioplasty before 65F 55M? No   Social History Narrative     Not on file     Social Determinants of Health     Financial Resource Strain: Not on file   Food Insecurity: Not on file   Transportation Needs: Not on file   Physical Activity: Not on file   Stress: Not on file   Social Connections: Not on file   Intimate Partner Violence: Not on file   Housing Stability: Not on file       FAMILY HISTORY:  Family History   Problem Relation Age of Onset     Diabetes Maternal Grandfather      Hypertension Maternal Grandfather      Hyperlipidemia Maternal Grandfather      Deep Vein Thrombosis Maternal Grandmother 32     Depression Mother      Unknown/Adopted Father      Diabetes Other      Unknown/Adopted Paternal Grandmother      C.A.D. No family hx of      Cerebrovascular Disease No family hx of      Anesthesia Reaction No family hx of      Arthritis No family hx of      Asthma No family hx of      Breast Cancer No family hx of      Cancer - colorectal No family hx of      Prostate Cancer No family hx of      Thyroid Disease No family hx of      Lipids No family hx of      Congenital Anomalies No family hx of        Past/family/social history reviewed and no changes    PHYSICAL EXAMINATION:  Constitutional: aaox3, cooperative, pleasant, not  "dyspneic/diaphoretic, no acute distress  Vitals reviewed: /85 (BP Location: Left arm, Patient Position: Sitting, Cuff Size: Adult Regular)   Pulse 97   Ht 1.702 m (5' 7\")   Wt 87.9 kg (193 lb 11.2 oz)   SpO2 100%   BMI 30.34 kg/m    Wt:   Wt Readings from Last 2 Encounters:   09/05/23 87.9 kg (193 lb 11.2 oz)   07/28/23 93.4 kg (206 lb)      Eyes: Sclera anicteric/injected  CV: No edema  Respiratory: Unlabored breathing  Abd: Nondistended, +bs, no hepatosplenomegaly, nontender, no peritoneal signs  Skin: warm, perfused, no jaundice  Psych: Normal affect  MSK: Normal gait                      Again, thank you for allowing me to participate in the care of your patient.        Sincerely,        Rg Alcantara PA-C  "

## 2023-09-05 NOTE — PROGRESS NOTES
NEW PATIENT GI CLINIC VISIT    CC/REFERRING MD:  Mitul Pennington  REASON FOR CONSULTATION:   Mitul Pennington for   Chief Complaint   Patient presents with    New Patient     New consult for diarrhea after eating, and abdominal pain.       ASSESSMENT/PLAN: Patient is here for evaluation of roughly 5 months of postprandial abdominal cramping and diarrhea of unclear cause.  We spent some time reviewing her work-up thus far, which included normal laboratory and stool testing from April of this year as well as noncontrast CT of the abdomen pelvis x2.  Reviewed potential etiologies, which could include infectious cause such as Giardia, malabsorptive cause such as celiac disease, EPI, other carbohydrate malabsorption, as well as IBD.  With unintentional weight loss, more sinister underlying cause such as malignancy should be considered as well.  We will start with laboratory work-up as detailed below.  We will likely plan on contrast-enhanced CT of the chest, abdomen, and pelvis, and consider colonoscopy as well.  We will see her labs first and then we will discuss medication therapies, which could include antispasmodic such as Bentyl or cholestyramine, given her history of cholecystectomy.  Patient stated understanding of plan and I will contact her as soon as labs are in.    Thank you for this consultation.  It was a pleasure to participate in the care of this patient; please contact us with any further questions.      25 minutes spent on the date of the encounter doing chart review, patient visit, and documentation    This note was created with voice recognition software, and while reviewed for accuracy, typos may remain.     Rg Alcantara PA-C  Division of Gastroenterology, Hepatology and Nutrition  [facility]  Roxane SOFYA Edwardsandra is a 48 year old female with a past medical history significant for migraines, hypertension, and DVT on chronic anticoagulation with Xarelto that  is seen as a new patient in the GI clinic today for postprandial abdominal cramping and diarrhea.  This problem started in March.  Prior to that, she had had symptoms like this intermittently, but it has been essentially consistent since March.  She describes having bilateral lower abdominal cramping with passage of Gladwin stool chart 6 or 7 stools after meals.  There has been no obvious dietary trigger, it is occurred with essentially all foods.  Because of this, she is actually really limited her oral intake, may only have 1 meal a day.  She has tried using Imodium, but this seemed to make her cramping worse.  She has not seen any blood in the stool.  There has been no associated nausea, vomiting, upper abdominal pain, early satiety, or heartburn.  She did have evaluation in April with primary care provider, had normal CBC, CMP, lipase, TSH, and had negative enteric pathogen's stool testing and negative C. difficile.  She did have a couple of noncontrast CTs, notable for left kidney stone, no other obvious abnormalities.  She did have negative Cologuard done as well.  Most recent colonoscopy was 2018, this was done for rectal bleeding.  Noted to have both internal and external hemorrhoids, otherwise no polyps, sigmoid diverticulosis noted.  No pertinent family medical history of GI diseases.  Surgical history is pertinent for cholecystectomy and tubal ligation.  The patient has had a roughly 25 pound weight loss since March, she thinks this is probably due to limited oral intake.    ROS:    10 point ROS neg other than the symptoms noted above in the HPI.    PREVIOUS ENDOSCOPY:  Reviewed, see HPI    PERTINENT RELEVANT IMAGING OR LABS:  Reviewed    ALLERGIES:     Allergies   Allergen Reactions    Chantix [Varenicline Tartrate] Anxiety       PERTINENT MEDICATIONS:    Current Outpatient Medications:     celecoxib (CELEBREX) 100 MG capsule, Take 1 capsule (100 mg) by mouth 2 times daily, Disp: 60 capsule, Rfl: 1     chlorhexidine (PERIDEX) 0.12 % solution, rinse mouth with 1/2 oz 2x a day, once in morning and once at night. spit after rinsing, do not swallow*, Disp: , Rfl:     cyclobenzaprine (FLEXERIL) 10 MG tablet, Take 1 tablet (10 mg) by mouth 3 times daily as needed for muscle spasms, Disp: 90 tablet, Rfl: 1    hydrOXYzine (ATARAX) 25 MG tablet, Take 1-2 tablets (25-50 mg) by mouth nightly as needed for other (insomnia), Disp: 60 tablet, Rfl: 1    losartan (COZAAR) 50 MG tablet, Take 1 tablet (50 mg) by mouth daily, Disp: 90 tablet, Rfl: 1    rivaroxaban ANTICOAGULANT (XARELTO) 10 MG TABS tablet, Take by mouth daily (with dinner) (Patient not taking: Reported on 9/5/2023), Disp: , Rfl:     SUMAtriptan (IMITREX) 50 MG tablet, Take 1 tablet (50 mg) by mouth at onset of headache for migraine May repeat in 2 hours. Max 4 tablets/24 hours. (Patient not taking: Reported on 9/5/2023), Disp: 20 tablet, Rfl: 1    PROBLEM LIST  Patient Active Problem List    Diagnosis Date Noted    Morbid obesity (H) 12/20/2021     Priority: Medium    History of DVT (deep vein thrombosis) 12/20/2021     Priority: Medium    Spasm of muscle 12/20/2021     Priority: Medium    Essential hypertension 12/20/2021     Priority: Medium    Migraine without aura and without status migrainosus, not intractable 12/20/2021     Priority: Medium    Class 1 obesity due to excess calories with serious comorbidity and body mass index (BMI) of 33.0 to 33.9 in adult 11/05/2020     Priority: Medium    Galactorrhea of left breast 10/16/2014     Priority: Medium    Generalized hyperhidrosis 10/16/2014     Priority: Medium    Fatigue 10/16/2014     Priority: Medium    CARDIOVASCULAR SCREENING; LDL GOAL LESS THAN 160 10/16/2014     Priority: Medium    Depression with anxiety 10/16/2014     Priority: Medium    Gastroesophageal reflux disease without esophagitis 06/11/2013     Priority: Medium    Anxiety 08/15/2012     Priority: Medium    Thumb injury 05/22/2012     Priority:  Medium    Prediabetes 03/23/2012     Priority: Medium    Venous insufficiency of leg-left 02/03/2012     Priority: Medium    PTSD (post-traumatic stress disorder) 09/07/2011     Priority: Medium    Tobacco use disorder 09/07/2011     Priority: Medium    Varicose veins 08/03/2011     Priority: Medium    Left leg pain 08/03/2011     Priority: Medium       PERTINENT PAST MEDICAL HISTORY:  Past Medical History:   Diagnosis Date    Benign essential hypertension     Depression     DVT (deep venous thrombosis) (H) 04/2011    left leg, was on lovenox and coumadin but have been off since November 2011    Essential hypertension 12/20/2021    H/O tubal ligation     2006     Obesity     Post-traumatic osteoarthritis of right shoulder 5/5/2022    Prediabetes        PREVIOUS SURGERIES:  Past Surgical History:   Procedure Laterality Date    EXAM UNDER ANESTHESIA ANUS N/A 4/30/2021    Procedure: EXAM UNDER ANESTHESIA;  Surgeon: Leandro Charles MD;  Location: [facility] OR    EXCISE LESION RECTUM N/A 4/30/2021    Procedure: Excision of coccygeal cyst;  Surgeon: Leandro Charles MD;  Location: [facility] OR    LAPAROSCOPIC CHOLECYSTECTOMY  10/9/2012    Procedure: LAPAROSCOPIC CHOLECYSTECTOMY;  Laparoscopic Cholecystectomy;  Surgeon: Martell Briscoe MD;  Location: [facility] OR    LAPAROSCOPIC TUBAL LIGATION      PROCTOSCOPY N/A 4/30/2021    Procedure: Proctoscopy;  Surgeon: Leandro Charles MD;  [place] [facility] [place]       SOCIAL HISTORY:  Social History     Socioeconomic History    Marital status:      Spouse name: Not on file    Number of children: 4    Years of education: Not on file    Highest education level: Not on file   Occupational History    Occupation: pharmacy tech   Tobacco Use    Smoking status: Former     Packs/day: 0.50     Years: 10.00     Pack years: 5.00     Types: Cigarettes, Other     Quit date: 10/16/2012     Years since quitting: 10.8     Passive exposure: Past    Smokeless tobacco: Never    Tobacco comments:     Vaping now   "  Vaping Use    Vaping Use: Every day    Substances: Nicotine, Flavoring   Substance and Sexual Activity    Alcohol use: Yes     Comment: Maybe once a year    Drug use: No    Sexual activity: Yes     Partners: Male     Birth control/protection: Surgical     Comment:  one partner   Other Topics Concern    Parent/sibling w/ CABG, MI or angioplasty before 65F 55M? No   Social History Narrative    Not on file     Social Determinants of Health     Financial Resource Strain: Not on file   Food Insecurity: Not on file   Transportation Needs: Not on file   Physical Activity: Not on file   Stress: Not on file   Social Connections: Not on file   Intimate Partner Violence: Not on file   Housing Stability: Not on file       FAMILY HISTORY:  Family History   Problem Relation Age of Onset    Diabetes Maternal Grandfather     Hypertension Maternal Grandfather     Hyperlipidemia Maternal Grandfather     Deep Vein Thrombosis Maternal Grandmother 32    Depression Mother     Unknown/Adopted Father     Diabetes Other     Unknown/Adopted Paternal Grandmother     C.A.D. No family hx of     Cerebrovascular Disease No family hx of     Anesthesia Reaction No family hx of     Arthritis No family hx of     Asthma No family hx of     Breast Cancer No family hx of     Cancer - colorectal No family hx of     Prostate Cancer No family hx of     Thyroid Disease No family hx of     Lipids No family hx of     Congenital Anomalies No family hx of        Past/family/social history reviewed and no changes    PHYSICAL EXAMINATION:  Constitutional: aaox3, cooperative, pleasant, not dyspneic/diaphoretic, no acute distress  Vitals reviewed: /85 (BP Location: Left arm, Patient Position: Sitting, Cuff Size: Adult Regular)   Pulse 97   Ht 1.702 m (5' 7\")   Wt 87.9 kg (193 lb 11.2 oz)   SpO2 100%   BMI 30.34 kg/m    Wt:   Wt Readings from Last 2 Encounters:   09/05/23 87.9 kg (193 lb 11.2 oz)   07/28/23 93.4 kg (206 lb)      Eyes: Sclera " anicteric/injected  CV: No edema  Respiratory: Unlabored breathing  Abd: Nondistended, +bs, no hepatosplenomegaly, nontender, no peritoneal signs  Skin: warm, perfused, no jaundice  Psych: Normal affect  MSK: Normal gait

## 2023-09-05 NOTE — NURSING NOTE
"Chief Complaint   Patient presents with    New Patient     New consult for diarrhea after eating, and abdominal pain.     She requests these members of her care team be copied on today's visit information:  PCP:   Referring Provider:  Mitul Pennington MD  [address] [place] [address]    Vitals:    09/05/23 1015   BP: 124/85   BP Location: Left arm   Patient Position: Sitting   Cuff Size: Adult Regular   Pulse: 97   SpO2: 100%   Weight: 87.9 kg (193 lb 11.2 oz)   Height: 1.702 m (5' 7\")     Body mass index is 30.34 kg/m .    Medications were reconciled.        Hanna Small CMA    "

## 2023-09-06 LAB — IGA SERPL-MCNC: 319 MG/DL (ref 84–499)

## 2023-09-07 PROCEDURE — 83993 ASSAY FOR CALPROTECTIN FECAL: CPT | Performed by: PHYSICIAN ASSISTANT

## 2023-09-07 PROCEDURE — 87329 GIARDIA AG IA: CPT | Performed by: PHYSICIAN ASSISTANT

## 2023-09-07 PROCEDURE — 82705 FATS/LIPIDS FECES QUAL: CPT | Mod: 90 | Performed by: PHYSICIAN ASSISTANT

## 2023-09-07 PROCEDURE — 87328 CRYPTOSPORIDIUM AG IA: CPT | Performed by: PHYSICIAN ASSISTANT

## 2023-09-07 PROCEDURE — 99000 SPECIMEN HANDLING OFFICE-LAB: CPT | Performed by: PHYSICIAN ASSISTANT

## 2023-09-08 LAB
TTG IGA SER-ACNC: 0.5 U/ML
TTG IGG SER-ACNC: <0.6 U/ML

## 2023-09-10 LAB
FAT STL QL: NORMAL
NEUTRAL FAT STL QL: NORMAL

## 2023-09-11 LAB
C PARVUM AG STL QL IA: NEGATIVE
G LAMBLIA AG STL QL IA: NEGATIVE

## 2023-09-11 NOTE — RESULT ENCOUNTER NOTE
Martinez Betts,    Most of your lab results are available for you to review.  Your baseline metabolic labs, blood count, testing for celiac disease, and stool test screening for infection and malabsorption have all come back negative.  The stool test screening for inflammation is still pending, I will reach out to you soon as that comes in.    Please let me know if you have any questions.    Sincerely,  Rg COWART [facility] GI, Hepatology, and Nutrition

## 2023-09-12 DIAGNOSIS — R10.9 ABDOMINAL CRAMPING: ICD-10-CM

## 2023-09-12 DIAGNOSIS — R19.7 DIARRHEA, UNSPECIFIED TYPE: Primary | ICD-10-CM

## 2023-09-12 LAB — CALPROTECTIN STL-MCNT: 91.6 MG/KG (ref 0–49.9)

## 2023-09-12 NOTE — RESULT ENCOUNTER NOTE
En Betts,    The last stool test is back and has come back somewhat elevated. The fecal calprotectin level is basically a white blood cell count of your stool. When elevated, it can indicate inflammation. I think our next step should be to have you do a colonoscopy to evaluate this further. I will place an order for this and one of my schedulers will contact you in a couple of days to get scheduled.    Please let me know if you have any questions.    Sincerely,  Rg Alcantara PA-C

## 2023-09-13 ENCOUNTER — TELEPHONE (OUTPATIENT)
Dept: GASTROENTEROLOGY | Facility: CLINIC | Age: 48
End: 2023-09-13
Payer: COMMERCIAL

## 2023-09-13 ENCOUNTER — TELEPHONE (OUTPATIENT)
Dept: GASTROENTEROLOGY | Facility: CLINIC | Age: 48
End: 2023-09-13

## 2023-09-13 DIAGNOSIS — R19.7 DIARRHEA: Primary | ICD-10-CM

## 2023-09-13 RX ORDER — BISACODYL 5 MG/1
TABLET, DELAYED RELEASE ORAL
Qty: 4 TABLET | Refills: 0 | Status: SHIPPED | OUTPATIENT
Start: 2023-09-13

## 2023-09-13 NOTE — TELEPHONE ENCOUNTER
"Endoscopy Scheduling Screen    Have you had a positive Covid test in the last 14 days?  No    Are you active on MyChart?   Yes    What insurance is in the chart?  Other:  [facility]    Ordering/Referring Provider:     ROSIE ROQUE      (If ordering provider performs procedure, schedule with ordering provider unless otherwise instructed. )    BMI: Estimated body mass index is 30.34 kg/m  as calculated from the following:    Height as of 9/5/23: 1.702 m (5' 7\").    Weight as of 9/5/23: 87.9 kg (193 lb 11.2 oz).     Sedation Ordered  moderate sedation.   If patient BMI > 50 do not schedule in [facility].    If patient BMI > 45 do not schedule at [facility].    Are you taking methadone or Suboxone?  No    Are you taking any prescription medications for pain 3 or more times per week?   No    Do you have a history of malignant hyperthermia or adverse reaction to anesthesia?  No    (Females) Are you currently pregnant?   No     Have you been diagnosed or told you have pulmonary hypertension?   No    Do you have an LVAD?  No    Have you been told you have moderate to severe sleep apnea?  No    Have you been told you have COPD, asthma, or any other lung disease?  No    Do you have any heart conditions?  No     Have you ever had an organ transplant?   No    Have you ever had or are you awaiting a heart or lung transplant?   No    Have you had a stroke or transient ischemic attack (TIA aka \"mini stroke\" in the last 6 months?   No    Have you been diagnosed with or been told you have cirrhosis of the liver?   No    Are you currently on dialysis?   No    Do you need assistance transferring?   No    BMI: Estimated body mass index is 30.34 kg/m  as calculated from the following:    Height as of 9/5/23: 1.702 m (5' 7\").    Weight as of 9/5/23: 87.9 kg (193 lb 11.2 oz).     Is patients BMI > 40 and scheduling location [facility]?  No    Do you take an injectable medication for weight loss or diabetes (excluding insulin)?  No    Do you take the medication " Naltrexone?  No    Do you take blood thinners?  No       Prep   Are you currently on dialysis or do you have chronic kidney disease?  No    Do you have a diagnosis of diabetes?  No    Do you have a diagnosis of cystic fibrosis (CF)?  No    On a regular basis do you go 3 -5 days between bowel movements?  No    BMI > 40?  No    Preferred Pharmacy:    [id number] - [place], [place] - [address]  [address] [address]  Phone: [phone] Fax: [phone]      Final Scheduling Details   Colonoscopy prep sent?  Standard MiraLAX    Procedure scheduled  Colonoscopy    Surgeon:  Christina    Date of procedure:  9/26     Pre-OP / PAC:   No - Not required for this site.    Location  [facility] - [facility] - Patient preference.    Sedation   Moderate Sedation - Per order.      Patient Reminders:   You will receive a call from a Nurse to review instructions and health history.  This assessment must be completed prior to your procedure.  Failure to complete the Nurse assessment may result in the procedure being cancelled.      On the day of your procedure, please designate an adult(s) who can drive you home stay with you for the next 24 hours. The medicines used in the exam will make you sleepy. You will not be able to drive.      You cannot take public transportation, ride share services, or non-medical taxi service without a responsible caregiver.  Medical transport services are allowed with the requirement that a responsible caregiver will receive you at your destination.  We require that drivers and caregivers are confirmed prior to your procedure.

## 2023-09-13 NOTE — TELEPHONE ENCOUNTER
Pre assessment completed for upcoming procedure.   (Please see previous telephone encounter notes for complete details)    Patient  returned call.       Procedure details:    Arrival time and facility location reviewed.    Pre op exam needed? N/A    Designated  policy reviewed. Instructed to have someone stay 6 hours post procedure.     COVID policy reviewed.      Medication review:    Medications reviewed. Please see supporting documentation below. Holding recommendations discussed (if applicable).   NSAID medication(s): Celecoxib (Celebrex): HOLD 3 days before procedure       Prep for procedure:     Procedure prep instructions reviewed.        Additional information needed?  Patient not taking Xarelto, Patient also requested Golytely so writer will [facility] and new instructions via [employer]      Patient  verbalized understanding and had no questions or concerns at this time.      Yola Felix RN  Endoscopy Procedure Pre Assessment RN  [phone] option 4

## 2023-09-13 NOTE — TELEPHONE ENCOUNTER
Attempted to contact patient in order to complete pre assessment questions.     No answer. Left message to return call to [phone] option 4      Procedure details:    Patient scheduled for Colonoscopy  on 09.26.2023.     Arrival time: 0915. Procedure time 1000    Pre op exam needed? N/A    Facility location: [facility]; [address], [place] [address]    Sedation type: Conscious sedation     Indication for procedure:    Diarrhea, unspecified type   Abdominal cramping         Chart review:     Electronic implanted devices? No    Diabetic? No    Diabetic medication HOLDING recommendations: (if applicable)  Oral diabetic medications: N/A  Diabetic injectables: N/A  Insulin: N/A      Medication review:    Anticoagulants? Yes Rivaroxaban (Xarelto): Recommended HOLD 2 days before procedure.  Consult with your managing provider.-patient reported no to blood thinners on telephone screening so verify    NSAIDS? Yes.  Celebrex (celecoxib).  Holding interval of 3 days.    Other medication HOLDING recommendations:  N/A      Prep for procedure:     Bowel prep recommendation: Standard Miralax  Due to: standard bowel prep.    Prep instructions sent via [employer].     Yola Felix RN  Endoscopy Procedure Pre Assessment RN

## 2023-09-26 ENCOUNTER — HOSPITAL ENCOUNTER (OUTPATIENT)
Facility: AMBULATORY SURGERY CENTER | Age: 48
Discharge: HOME | End: 2023-09-26
Attending: SPECIALIST | Admitting: SPECIALIST
Payer: COMMERCIAL

## 2023-09-26 VITALS
TEMPERATURE: 97.3 F | BODY MASS INDEX: 30.23 KG/M2 | OXYGEN SATURATION: 99 % | SYSTOLIC BLOOD PRESSURE: 115 MMHG | HEART RATE: 74 BPM | RESPIRATION RATE: 16 BRPM | WEIGHT: 193 LBS | DIASTOLIC BLOOD PRESSURE: 82 MMHG

## 2023-09-26 LAB — COLONOSCOPY: NORMAL

## 2023-09-26 PROCEDURE — G8907 PT DOC NO EVENTS ON DISCHARG: HCPCS

## 2023-09-26 PROCEDURE — 45380 COLONOSCOPY AND BIOPSY: CPT

## 2023-09-26 PROCEDURE — G8918 PT W/O PREOP ORDER IV AB PRO: HCPCS

## 2023-09-26 RX ORDER — FENTANYL CITRATE 50 UG/ML
INJECTION, SOLUTION INTRAMUSCULAR; INTRAVENOUS PRN
Status: DISCONTINUED | OUTPATIENT
Start: 2023-09-26 | End: 2023-09-26 | Stop reason: HOSPADM

## 2023-09-26 RX ORDER — LIDOCAINE 40 MG/G
CREAM TOPICAL
Status: DISCONTINUED | OUTPATIENT
Start: 2023-09-26 | End: 2023-09-27 | Stop reason: HOSPADM

## 2023-09-26 RX ORDER — ONDANSETRON 2 MG/ML
4 INJECTION INTRAMUSCULAR; INTRAVENOUS
Status: DISCONTINUED | OUTPATIENT
Start: 2023-09-26 | End: 2023-09-27 | Stop reason: HOSPADM

## 2023-09-26 NOTE — H&P
Pre-Endoscopy History and Physical     Roxane Lopes MRN# [id number]   YOB: 1975 Age: 48 year old     Date of Procedure: 9/26/2023  Primary care provider: System, Provider Not In  Type of Endoscopy: colonoscopy  Reason for Procedure: abdominal cramping, diarrhea, increased fecal calprotectin  Type of Anesthesia Anticipated: Moderate sedation    HPI:    Roxane is a 48 year old female who will be undergoing the above procedure.      A history and physical has been performed. The patient's medications and allergies have been reviewed. The risks and benefits of the procedure and the sedation options and risks were discussed with the patient.  All questions were answered and informed consent was obtained.      She denies a personal or family history of anesthesia complications or bleeding disorders.     Allergies   Allergen Reactions    Chantix [Varenicline Tartrate] Anxiety        Current Outpatient Medications   Medication    celecoxib (CELEBREX) 100 MG capsule    chlorhexidine (PERIDEX) 0.12 % solution    cyclobenzaprine (FLEXERIL) 10 MG tablet    hydrOXYzine (ATARAX) 25 MG tablet    losartan (COZAAR) 50 MG tablet    rivaroxaban ANTICOAGULANT (XARELTO) 10 MG TABS tablet    SUMAtriptan (IMITREX) 50 MG tablet    bisacodyl (DULCOLAX) 5 MG EC tablet    polyethylene glycol (GOLYTELY) 236 g suspension     Current Facility-Administered Medications   Medication    lidocaine (LMX4) kit    lidocaine 1 % 0.1-1 mL    ondansetron (ZOFRAN) injection 4 mg    sodium chloride (PF) 0.9% PF flush 3 mL    sodium chloride (PF) 0.9% PF flush 3 mL       Patient Active Problem List   Diagnosis    Varicose veins    Left leg pain    PTSD (post-traumatic stress disorder)    Tobacco use disorder    Venous insufficiency of leg-left    Prediabetes    Thumb injury    Anxiety    Gastroesophageal reflux disease without esophagitis    Galactorrhea of left breast    Generalized hyperhidrosis    Fatigue    CARDIOVASCULAR SCREENING; LDL GOAL  LESS THAN 160    Depression with anxiety    Class 1 obesity due to excess calories with serious comorbidity and body mass index (BMI) of 33.0 to 33.9 in adult    Morbid obesity (H)    History of DVT (deep vein thrombosis)    Spasm of muscle    Essential hypertension    Migraine without aura and without status migrainosus, not intractable        Past Medical History:   Diagnosis Date    Benign essential hypertension     Depression     DVT (deep venous thrombosis) (H) 04/2011    left leg, was on lovenox and coumadin but have been off since November 2011    Essential hypertension 12/20/2021    H/O tubal ligation     2006     Obesity     Post-traumatic osteoarthritis of right shoulder 5/5/2022    Prediabetes         Past Surgical History:   Procedure Laterality Date    EXAM UNDER ANESTHESIA ANUS N/A 4/30/2021    Procedure: EXAM UNDER ANESTHESIA;  Surgeon: Leandro Charles MD;  Location: [facility] OR    EXCISE LESION RECTUM N/A 4/30/2021    Procedure: Excision of coccygeal cyst;  Surgeon: Leandro Charles MD;  Location: [facility] OR    LAPAROSCOPIC CHOLECYSTECTOMY  10/9/2012    Procedure: LAPAROSCOPIC CHOLECYSTECTOMY;  Laparoscopic Cholecystectomy;  Surgeon: Martell Briscoe MD;  Location: [facility] OR    LAPAROSCOPIC TUBAL LIGATION      PROCTOSCOPY N/A 4/30/2021    Procedure: Proctoscopy;  Surgeon: Leandro Charles MD;  Location: [facility] OR       Social History     Tobacco Use    Smoking status: Former     Packs/day: 0.50     Years: 10.00     Pack years: 5.00     Types: Cigarettes, Other     Quit date: 10/16/2012     Years since quitting: 10.9     Passive exposure: Past    Smokeless tobacco: Never    Tobacco comments:     Vaping now    Substance Use Topics    Alcohol use: Yes     Comment: Maybe once a year       Family History   Problem Relation Age of Onset    Diabetes Maternal Grandfather     Hypertension Maternal Grandfather     Hyperlipidemia Maternal Grandfather     Deep Vein Thrombosis Maternal Grandmother 32    Depression Mother      "Unknown/Adopted Father     Diabetes Other     Unknown/Adopted Paternal Grandmother     C.A.D. No family hx of     Cerebrovascular Disease No family hx of     Anesthesia Reaction No family hx of     Arthritis No family hx of     Asthma No family hx of     Breast Cancer No family hx of     Cancer - colorectal No family hx of     Prostate Cancer No family hx of     Thyroid Disease No family hx of     Lipids No family hx of     Congenital Anomalies No family hx of        REVIEW OF SYSTEMS:   5 point ROS negative except as noted above in HPI, including Gen., Resp., CV, GI &  system review.    PHYSICAL EXAM:   /80 (Cuff Size: Adult Regular)   Pulse 74   Temp 97.4  F (36.3  C) (Temporal)   Resp 16   Wt 87.5 kg (193 lb)   SpO2 99%   BMI 30.23 kg/m   Estimated body mass index is 30.23 kg/m  as calculated from the following:    Height as of 9/5/23: 1.702 m (5' 7\").    Weight as of this encounter: 87.5 kg (193 lb).   GENERAL APPEARANCE: healthy and over weight  MENTAL STATUS: alert  AIRWAY EXAM: Mallampatti Class I (visualization of the soft palate, fauces, uvula, anterior and posterior pillars)  RESP: lungs clear to auscultation - no rales, rhonchi or wheezes  CV: regular rates and rhythm, normal S1 S2, no S3 or S4, and no murmur, click or rub    DIAGNOSTICS:    Not indicated    IMPRESSION   ASA Class 2 - Mild systemic disease    PLAN:   Colonoscopy with biopsies    The above has been forwarded to the consulting provider.      Signed Electronically by: Kameron Vasquez MD  September 26, 2023        "

## 2023-09-28 LAB
PATH REPORT.COMMENTS IMP SPEC: NORMAL
PATH REPORT.FINAL DX SPEC: NORMAL
PATH REPORT.GROSS SPEC: NORMAL
PATH REPORT.MICROSCOPIC SPEC OTHER STN: NORMAL
PATH REPORT.RELEVANT HX SPEC: NORMAL
PHOTO IMAGE: NORMAL

## 2023-09-28 PROCEDURE — 88305 TISSUE EXAM BY PATHOLOGIST: CPT | Performed by: PATHOLOGY

## 2023-09-29 DIAGNOSIS — R19.7 DIARRHEA, UNSPECIFIED TYPE: Primary | ICD-10-CM

## 2023-09-29 NOTE — RESULT ENCOUNTER NOTE
Martinez Betts,    The report and subsequent biopsies from your recent colonoscopy are available for you to review.  Overall, everything looked normal in the colon.  There was some diverticulosis in the left side of the colon, which is common and should not be contributing to any symptoms.  The doctor did take some biopsies throughout your colon and this did show some minor inflammation, but it is not consistent with any kind of inflammatory bowel disease or other concerning finding.  It might be due to some of the current medications you take.    So, all of the tests we have done thus far have been reassuring.  I think a reasonable next step would be to consider providing a medication to help improve your symptoms.  A good option here would be a medication called dicyclomine, which is an antispasmodic for the colon and can help reduce pain and loose stool.  This medication can be taken as needed and is generally very well-tolerated.  If you are interested in trying it, please let me know.    I would also like to recheck the stool test that I initially identified inflammation in the first place.  I will place a future order for this and you can complete this test in a few weeks at your convenience.    Please let me know if you have any questions.    Sincerely,  Rg COWART [facility] GI, Hepatology, and Nutrition

## 2023-10-04 ENCOUNTER — MYC MEDICAL ADVICE (OUTPATIENT)
Dept: GASTROENTEROLOGY | Facility: CLINIC | Age: 48
End: 2023-10-04
Payer: COMMERCIAL

## 2023-10-04 DIAGNOSIS — R19.7 DIARRHEA, UNSPECIFIED TYPE: Primary | ICD-10-CM

## 2023-10-04 DIAGNOSIS — R10.9 ABDOMINAL CRAMPING: ICD-10-CM

## 2023-10-04 RX ORDER — DICYCLOMINE HCL 20 MG
20 TABLET ORAL 4 TIMES DAILY PRN
Qty: 30 TABLET | Refills: 0 | Status: SHIPPED | OUTPATIENT
Start: 2023-10-04

## 2024-05-07 ENCOUNTER — OFFICE VISIT (OUTPATIENT)
Dept: URGENT CARE | Facility: URGENT CARE | Age: 49
End: 2024-05-07
Payer: COMMERCIAL

## 2024-05-07 ENCOUNTER — ANCILLARY PROCEDURE (OUTPATIENT)
Dept: GENERAL RADIOLOGY | Facility: CLINIC | Age: 49
End: 2024-05-07
Attending: PHYSICIAN ASSISTANT
Payer: COMMERCIAL

## 2024-05-07 VITALS
HEART RATE: 93 BPM | RESPIRATION RATE: 18 BRPM | BODY MASS INDEX: 30.54 KG/M2 | SYSTOLIC BLOOD PRESSURE: 130 MMHG | TEMPERATURE: 98.2 F | OXYGEN SATURATION: 100 % | WEIGHT: 195 LBS | DIASTOLIC BLOOD PRESSURE: 85 MMHG

## 2024-05-07 DIAGNOSIS — M26.609 TMJ (TEMPOROMANDIBULAR JOINT SYNDROME): Primary | ICD-10-CM

## 2024-05-07 PROCEDURE — 70328 X-RAY EXAM OF JAW JOINT: CPT | Mod: TC | Performed by: RADIOLOGY

## 2024-05-07 PROCEDURE — 99213 OFFICE O/P EST LOW 20 MIN: CPT | Performed by: PHYSICIAN ASSISTANT

## 2024-05-07 RX ORDER — METHOCARBAMOL 500 MG/1
500 TABLET, FILM COATED ORAL 4 TIMES DAILY PRN
Qty: 30 TABLET | Refills: 0 | Status: SHIPPED | OUTPATIENT
Start: 2024-05-07

## 2024-05-07 RX ORDER — PREDNISONE 20 MG/1
40 TABLET ORAL DAILY
Qty: 14 TABLET | Refills: 0 | Status: SHIPPED | OUTPATIENT
Start: 2024-05-07 | End: 2024-05-14

## 2024-05-07 ASSESSMENT — ENCOUNTER SYMPTOMS
PALPITATIONS: 0
CARDIOVASCULAR NEGATIVE: 1
CHILLS: 0
SORE THROAT: 0
SHORTNESS OF BREATH: 0
WHEEZING: 0
SINUS PAIN: 0
NECK STIFFNESS: 0
MYALGIAS: 1
FATIGUE: 0
RHINORRHEA: 0
ARTHRALGIAS: 1
SINUS PRESSURE: 0
DIARRHEA: 0
NAUSEA: 0
FEVER: 0
COUGH: 0
VOMITING: 0
BACK PAIN: 0
NECK PAIN: 0
JOINT SWELLING: 0

## 2024-05-07 NOTE — PROGRESS NOTES
Julio Betts is a 49 year old, presenting for the following health issues:  Jaw Pain (Hx of TMJ, this episode started yesterday during lunch)    HPI   Musculoskeletal problem/pain  Onset/Duration: yesterday  Description  Location: Right TMJ  Joint Swelling: Yes  Redness: no  Pain: YES  Warmth: no  Intensity:  moderate  Progression of Symptoms:  worsening  Accompanying signs and symptoms:   Fevers: no  Numbness/tingling/weakness: no  History  Trauma to the area: no but had been chewing on a hamburger and fries yesterday.  Felt like her jaw was moving around.     Recent illness:  no  Previous similar problem: Yes, reports hx of TMJ  Previous evaluation:  no  Precipitating or alleviating factors:  Aggravating factors include: pressure, talking, opening and closing her mouth, overuse  Therapies tried and outcome: rest/inactivity, immobilization, Ibuprofen, with minimal relief    Patient Active Problem List   Diagnosis    Varicose veins    Left leg pain    PTSD (post-traumatic stress disorder)    Tobacco use disorder    Venous insufficiency of leg-left    Prediabetes    Thumb injury    Anxiety    Gastroesophageal reflux disease without esophagitis    Galactorrhea of left breast    Generalized hyperhidrosis    Fatigue    CARDIOVASCULAR SCREENING; LDL GOAL LESS THAN 160    Depression with anxiety    Class 1 obesity due to excess calories with serious comorbidity and body mass index (BMI) of 33.0 to 33.9 in adult    Morbid obesity (H)    History of DVT (deep vein thrombosis)    Spasm of muscle    Essential hypertension    Migraine without aura and without status migrainosus, not intractable     Current Outpatient Medications   Medication Sig Dispense Refill    bisacodyl (DULCOLAX) 5 MG EC tablet Take 2 tablets at 3 pm the day before your procedure. If your procedure is before 11 am, take 2 additional tablets at 11 pm. If your procedure is after 11 am, take 2 additional tablets at 6 am. For additional instructions  refer to your colonoscopy prep instructions. 4 tablet 0    celecoxib (CELEBREX) 100 MG capsule Take 1 capsule (100 mg) by mouth 2 times daily 60 capsule 1    chlorhexidine (PERIDEX) 0.12 % solution rinse mouth with 1/2 oz 2x a day, once in morning and once at night. spit after rinsing, do not swallow*      cyclobenzaprine (FLEXERIL) 10 MG tablet Take 1 tablet (10 mg) by mouth 3 times daily as needed for muscle spasms 90 tablet 1    dicyclomine (BENTYL) 20 MG tablet Take 1 tablet (20 mg) by mouth 4 times daily as needed (abdominal pain, loose stool) 30 tablet 0    hydrOXYzine (ATARAX) 25 MG tablet Take 1-2 tablets (25-50 mg) by mouth nightly as needed for other (insomnia) 60 tablet 1    losartan (COZAAR) 50 MG tablet Take 1 tablet (50 mg) by mouth daily 90 tablet 1    polyethylene glycol (GOLYTELY) 236 g suspension The night before the exam at 6 pm drink an 8-ounce glass every 15 minutes until the jug is half empty. If you arrive before 11 AM: Drink the other half of the Golytely jug at 11 PM night before procedure. If you arrive after 11 AM: Drink the other half of the Golytely jug at 6 AM day of procedure. For additional instructions refer to your colonoscopy prep instructions. 4000 mL 0    rivaroxaban ANTICOAGULANT (XARELTO) 10 MG TABS tablet Take by mouth daily (with dinner) (Patient not taking: Reported on 5/7/2024)      SUMAtriptan (IMITREX) 50 MG tablet Take 1 tablet (50 mg) by mouth at onset of headache for migraine May repeat in 2 hours. Max 4 tablets/24 hours. (Patient not taking: Reported on 5/7/2024) 20 tablet 1     No current facility-administered medications for this visit.        Allergies   Allergen Reactions    Chantix [Varenicline Tartrate] Anxiety       Review of Systems   Constitutional:  Negative for chills, fatigue and fever.   HENT:  Negative for congestion, ear pain, rhinorrhea, sinus pressure, sinus pain and sore throat.    Respiratory:  Negative for cough, shortness of breath and wheezing.     Cardiovascular: Negative.  Negative for chest pain, palpitations and leg swelling.   Gastrointestinal:  Negative for diarrhea, nausea and vomiting.   Musculoskeletal:  Positive for arthralgias and myalgias. Negative for back pain, gait problem, joint swelling, neck pain and neck stiffness.   All other systems reviewed and are negative.          Objective    /85   Pulse 93   Temp 98.2  F (36.8  C)   Resp 18   Wt 88.5 kg (195 lb)   SpO2 100%   BMI 30.54 kg/m    Body mass index is 30.54 kg/m .  Physical Exam  Vitals and nursing note reviewed.   Constitutional:       General: She is not in acute distress.     Appearance: Normal appearance. She is well-developed and normal weight. She is not ill-appearing.   HENT:      Head: Normocephalic and atraumatic.      Jaw: Tenderness (over the right TMJ), swelling (mild) and pain on movement present. No trismus or malocclusion.      Comments: TMs are intact without any erythema or bulging bilaterally.  Airway is patent.     Nose: Nose normal.      Mouth/Throat:      Lips: Pink.      Mouth: Mucous membranes are moist.      Pharynx: Oropharynx is clear. Uvula midline. No pharyngeal swelling, oropharyngeal exudate or posterior oropharyngeal erythema.      Tonsils: No tonsillar exudate.   Eyes:      General: No scleral icterus.     Extraocular Movements: Extraocular movements intact.      Conjunctiva/sclera: Conjunctivae normal.      Pupils: Pupils are equal, round, and reactive to light.   Neck:      Thyroid: No thyromegaly.   Cardiovascular:      Rate and Rhythm: Normal rate and regular rhythm.      Pulses: Normal pulses.      Heart sounds: Normal heart sounds, S1 normal and S2 normal. No murmur heard.     No friction rub. No gallop.   Pulmonary:      Effort: Pulmonary effort is normal. No accessory muscle usage, respiratory distress or retractions.      Breath sounds: Normal breath sounds and air entry. No stridor. No decreased breath sounds, wheezing, rhonchi or  rales.   Musculoskeletal:      Cervical back: Normal range of motion and neck supple.   Lymphadenopathy:      Cervical: No cervical adenopathy.   Skin:     General: Skin is warm and dry.      Nails: There is no clubbing.   Neurological:      Mental Status: She is alert and oriented to person, place, and time.   Psychiatric:         Mood and Affect: Mood normal.         Behavior: Behavior normal.         Thought Content: Thought content normal.         Judgment: Judgment normal.       Results for orders placed or performed in visit on 05/07/24 (from the past 24 hour(s))   XR TMJ Open/Closed Right    Narrative    EXAM: XR TMJ OPEN/CLOSED RIGHT  LOCATION: [facility]  DATE: 5/7/2024    INDICATION:  TMJ (temporomandibular joint syndrome)  COMPARISON: None.      Impression    IMPRESSION:   Normal right TMJ for age.  The right condyle is intact and is well seated in the right glenoid fossa on closed mouth view.  The right TMJ joint space is well preserved.  On open-mouth view, the right mandibular occipital condyle is situated adjacent to   the articular eminence. The visualized mandible is normal.    No open or closed mouth lateral view of the left TMJ.             Assessment/Plan:  TMJ (temporomandibular joint syndrome):  Xrays are negative for acute fractures or dislocations. Most likely strain/sprain/spasm.  Recommend RICE and will give [id number] and methocarbamol prn pain.  Discussed risks and benefits of medication along with side effects, direction for use.  No driving or operating machinery due to sedation.  Will also send to ENT if no improvement.  Recheck in clinic if symptoms worsen or if symptoms do not improve.   -     XR TMJ Open/Closed Right  -     Adult ENT  Referral; Future  -     predniSONE (DELTASONE) 20 MG tablet; Take 2 tablets (40 mg) by mouth daily for 7 days  -     methocarbamol (ROBAXIN) 500 MG tablet; Take 1 tablet (500 mg) by mouth 4 times daily as  needed for muscle spasms        Sair See DAVE Olivas

## 2024-05-14 ENCOUNTER — OFFICE VISIT (OUTPATIENT)
Dept: FAMILY MEDICINE | Facility: CLINIC | Age: 49
End: 2024-05-14
Payer: COMMERCIAL

## 2024-05-14 VITALS
SYSTOLIC BLOOD PRESSURE: 134 MMHG | RESPIRATION RATE: 12 BRPM | TEMPERATURE: 98.2 F | HEIGHT: 67 IN | WEIGHT: 198.6 LBS | OXYGEN SATURATION: 100 % | BODY MASS INDEX: 31.17 KG/M2 | DIASTOLIC BLOOD PRESSURE: 88 MMHG | HEART RATE: 72 BPM

## 2024-05-14 DIAGNOSIS — Z13.29 SCREENING FOR THYROID DISORDER: ICD-10-CM

## 2024-05-14 DIAGNOSIS — Z13.1 SCREENING FOR DIABETES MELLITUS (DM): ICD-10-CM

## 2024-05-14 DIAGNOSIS — Z13.0 SCREENING FOR DISORDER OF BLOOD AND BLOOD-FORMING ORGANS: ICD-10-CM

## 2024-05-14 DIAGNOSIS — D64.9 ANEMIA, UNSPECIFIED TYPE: ICD-10-CM

## 2024-05-14 DIAGNOSIS — M26.609 TMJ (TEMPOROMANDIBULAR JOINT SYNDROME): ICD-10-CM

## 2024-05-14 DIAGNOSIS — I10 ESSENTIAL HYPERTENSION: Primary | ICD-10-CM

## 2024-05-14 DIAGNOSIS — F41.8 DEPRESSION WITH ANXIETY: ICD-10-CM

## 2024-05-14 DIAGNOSIS — E66.09 CLASS 1 OBESITY DUE TO EXCESS CALORIES WITH SERIOUS COMORBIDITY AND BODY MASS INDEX (BMI) OF 33.0 TO 33.9 IN ADULT: ICD-10-CM

## 2024-05-14 DIAGNOSIS — Z13.6 CARDIOVASCULAR SCREENING; LDL GOAL LESS THAN 160: ICD-10-CM

## 2024-05-14 DIAGNOSIS — E66.811 CLASS 1 OBESITY DUE TO EXCESS CALORIES WITH SERIOUS COMORBIDITY AND BODY MASS INDEX (BMI) OF 33.0 TO 33.9 IN ADULT: ICD-10-CM

## 2024-05-14 LAB
ERYTHROCYTE [DISTWIDTH] IN BLOOD BY AUTOMATED COUNT: 13.9 % (ref 10–15)
HBA1C MFR BLD: 5.1 % (ref 0–5.6)
HCT VFR BLD AUTO: 35 % (ref 35–47)
HGB BLD-MCNC: 11.6 G/DL (ref 11.7–15.7)
MCH RBC QN AUTO: 30.1 PG (ref 26.5–33)
MCHC RBC AUTO-ENTMCNC: 33.1 G/DL (ref 31.5–36.5)
MCV RBC AUTO: 91 FL (ref 78–100)
PLATELET # BLD AUTO: 333 10E3/UL (ref 150–450)
RBC # BLD AUTO: 3.86 10E6/UL (ref 3.8–5.2)
WBC # BLD AUTO: 9 10E3/UL (ref 4–11)

## 2024-05-14 PROCEDURE — 99214 OFFICE O/P EST MOD 30 MIN: CPT | Performed by: NURSE PRACTITIONER

## 2024-05-14 PROCEDURE — 83540 ASSAY OF IRON: CPT | Performed by: NURSE PRACTITIONER

## 2024-05-14 PROCEDURE — 80061 LIPID PANEL: CPT | Performed by: NURSE PRACTITIONER

## 2024-05-14 PROCEDURE — G2211 COMPLEX E/M VISIT ADD ON: HCPCS | Performed by: NURSE PRACTITIONER

## 2024-05-14 PROCEDURE — 84443 ASSAY THYROID STIM HORMONE: CPT | Performed by: NURSE PRACTITIONER

## 2024-05-14 PROCEDURE — 36415 COLL VENOUS BLD VENIPUNCTURE: CPT | Performed by: NURSE PRACTITIONER

## 2024-05-14 PROCEDURE — 82728 ASSAY OF FERRITIN: CPT | Performed by: NURSE PRACTITIONER

## 2024-05-14 PROCEDURE — 83550 IRON BINDING TEST: CPT | Performed by: NURSE PRACTITIONER

## 2024-05-14 PROCEDURE — 80053 COMPREHEN METABOLIC PANEL: CPT | Performed by: NURSE PRACTITIONER

## 2024-05-14 PROCEDURE — 82043 UR ALBUMIN QUANTITATIVE: CPT | Performed by: NURSE PRACTITIONER

## 2024-05-14 PROCEDURE — 82570 ASSAY OF URINE CREATININE: CPT | Performed by: NURSE PRACTITIONER

## 2024-05-14 PROCEDURE — 83036 HEMOGLOBIN GLYCOSYLATED A1C: CPT | Performed by: NURSE PRACTITIONER

## 2024-05-14 PROCEDURE — 85027 COMPLETE CBC AUTOMATED: CPT | Performed by: NURSE PRACTITIONER

## 2024-05-14 RX ORDER — LOSARTAN POTASSIUM 25 MG/1
25 TABLET ORAL DAILY
Qty: 90 TABLET | Refills: 3 | Status: SHIPPED | OUTPATIENT
Start: 2024-05-14

## 2024-05-14 RX ORDER — TRAMADOL HYDROCHLORIDE 50 MG/1
50 TABLET ORAL EVERY 6 HOURS PRN
Qty: 10 TABLET | Refills: 0 | Status: SHIPPED | OUTPATIENT
Start: 2024-05-14 | End: 2024-05-17

## 2024-05-14 RX ORDER — CYCLOBENZAPRINE HCL 10 MG
10 TABLET ORAL 3 TIMES DAILY PRN
Qty: 30 TABLET | Refills: 1 | Status: SHIPPED | OUTPATIENT
Start: 2024-05-14

## 2024-05-14 ASSESSMENT — PAIN SCALES - GENERAL: PAINLEVEL: MILD PAIN (2)

## 2024-05-14 NOTE — PATIENT INSTRUCTIONS
PLAN:   1.   Symptomatic therapy suggested: decrease losartan to 25 mg a day .  2.  Orders Placed This Encounter   Medications    losartan (COZAAR) 25 MG tablet     Sig: Take 1 tablet (25 mg) by mouth daily     Dispense:  90 tablet     Refill:  3    cyclobenzaprine (FLEXERIL) 10 MG tablet     Sig: Take 1 tablet (10 mg) by mouth 3 times daily as needed for muscle spasms     Dispense:  30 tablet     Refill:  1    traMADol (ULTRAM) 50 MG tablet     Sig: Take 1 tablet (50 mg) by mouth every 6 hours as needed for severe pain     Dispense:  10 tablet     Refill:  0     Orders Placed This Encounter   Procedures    REVIEW OF HEALTH MAINTENANCE PROTOCOL ORDERS    MR Temporomandibular Joints    Lipid panel reflex to direct LDL Fasting    CBC with platelets    Comprehensive metabolic panel    Hemoglobin A1c    TSH with free T4 reflex    Albumin Random Urine Quantitative with Creat Ratio    Pain Management  Referral       3.  CONSULTATION/REFERRAL to head and neck clinic   MRI TMJ   I will place order. Please call [phone] to schedule.  Will follow up and/or notify patient of  results via My Chart to determine further need for followup    Patient needs to follow up in if no improvement,or sooner if worsening of symptoms or other symptoms develop.

## 2024-05-14 NOTE — PROGRESS NOTES
Julio Betts is a 49 year old, presenting for the following health issues:  Follow Up (Follow up on diabetes, hypertension, jaw pain and med check)        5/14/2024    10:06 AM   Additional Questions   Roomed by Joselyn AGGARWAL   Accompanied by Self         5/14/2024    10:06 AM   Patient Reported Additional Medications   Patient reports taking the following new medications None     History of Present Illness       Diabetes:   She presents for follow up of diabetes.    She is not checking blood glucose.        She is concerned about other.   She is having numbness in feet, excessive thirst and weight loss.            Hypertension: She presents for follow up of hypertension.  She does not check blood pressure  regularly outside of the clinic. Outpatient blood pressures have not been over 140/90. She does not follow a low salt diet.     Reason for visit:  Blood pressure  jaw pain etc    She eats 0-1 servings of fruits and vegetables daily.She consumes 2 sweetened beverage(s) daily.She exercises with enough effort to increase her heart rate 10 to 19 minutes per day.  She exercises with enough effort to increase her heart rate 3 or less days per week. She is missing 3 dose(s) of medications per week.      Hyperlipidemia Follow-Up    Are you regularly taking any medication or supplement to lower your cholesterol?   No  Are you having muscle aches or other side effects that you think could be caused by your cholesterol lowering medication?  No    Hypertension Follow-up    Do you check your blood pressure regularly outside of the clinic? Yes   Are you following a low salt diet? Yes  Are your blood pressures ever more than 140 on the top number (systolic) OR more   than 90 on the bottom number (diastolic), for example 140/90? No  Is concerned as if take BP medications daily gets lightheaded particularly with the weight los   Has lost 52 lbs in the last 6 months and not really trying   Had a colonoscopy last fall   Was  some concern about diabetes   Also having issues where food goes right through her and is seeing gastroenterology     BP Readings from Last 2 Encounters:   05/14/24 134/88   05/07/24 130/85     Hemoglobin A1C (%)   Date Value   05/14/2024 5.1   04/13/2023 5.0   11/05/2020 4.9   09/01/2016 4.9     LDL Cholesterol Calculated (mg/dL)   Date Value   05/14/2024 91   12/08/2022 133 (H)   11/05/2020 130 (H)   12/10/2019 125 (H)     Concern - has a hx of TMJ and was seen at [facility] for this   A week ago her jaw shifted and could hardly open her jaw   Onset: years   Description: pain in both TMJ   Intensity: moderate  Progression of Symptoms:  constant  Accompanying Signs & Symptoms: pain in both sides   Previous history of similar problem: has seen specialist years ago   Precipitating factors:        Worsened by: unsure   Alleviating factors:        Improved by: nothing   Therapies tried and outcome:  none   Labs reviewed in EPIC  BP Readings from Last 3 Encounters:   05/14/24 134/88   05/07/24 130/85   09/26/23 115/82    Wt Readings from Last 3 Encounters:   05/14/24 90.1 kg (198 lb 9.6 oz)   05/07/24 88.5 kg (195 lb)   09/26/23 87.5 kg (193 lb)                  Patient Active Problem List   Diagnosis    Varicose veins    Left leg pain    PTSD (post-traumatic stress disorder)    Tobacco use disorder    Venous insufficiency of leg-left    Prediabetes    Thumb injury    Anxiety    Gastroesophageal reflux disease without esophagitis    Galactorrhea of left breast    Generalized hyperhidrosis    Fatigue    CARDIOVASCULAR SCREENING; LDL GOAL LESS THAN 160    Depression with anxiety    Class 1 obesity due to excess calories with serious comorbidity and body mass index (BMI) of 33.0 to 33.9 in adult    Morbid obesity (H)    History of DVT (deep vein thrombosis)    Spasm of muscle    Essential hypertension    Migraine without aura and without status migrainosus, not intractable     Past Surgical History:   Procedure  Laterality Date    COLONOSCOPY N/A 2023    Procedure: COLONOSCOPY, WITH POLYPECTOMY AND BIOPSY;  Surgeon: Kameron Vasquez MD;  Location: [facility] OR    COLONOSCOPY WITH CO2 INSUFFLATION N/A 2023    Procedure: Colonoscopy with CO2 insufflation;  Surgeon: Kameron Vasuqez MD;  Location: [facility] OR    EXAM UNDER ANESTHESIA ANUS N/A 2021    Procedure: EXAM UNDER ANESTHESIA;  Surgeon: Leandro Charles MD;  Location: [facility] OR    EXCISE LESION RECTUM N/A 2021    Procedure: Excision of coccygeal cyst;  Surgeon: Leandro Charles MD;  Location: [facility] OR    LAPAROSCOPIC CHOLECYSTECTOMY  10/9/2012    Procedure: LAPAROSCOPIC CHOLECYSTECTOMY;  Laparoscopic Cholecystectomy;  Surgeon: Martell Briscoe MD;  Location: [facility] OR    LAPAROSCOPIC TUBAL LIGATION      PROCTOSCOPY N/A 2021    Procedure: Proctoscopy;  Surgeon: Leandro Charles MD;  Location: [facility] OR       Social History     Tobacco Use    Smoking status: Former     Current packs/day: 0.00     Average packs/day: 0.5 packs/day for 10.0 years (5.0 ttl pk-yrs)     Types: Cigarettes, Other     Start date: 10/16/2002     Quit date: 10/16/2012     Years since quittin.5     Passive exposure: Past    Smokeless tobacco: Never    Tobacco comments:     Vaping now    Substance Use Topics    Alcohol use: Yes     Comment: Maybe once a year     Family History   Problem Relation Age of Onset    Diabetes Maternal Grandfather     Hypertension Maternal Grandfather     Hyperlipidemia Maternal Grandfather     Deep Vein Thrombosis Maternal Grandmother 32    Depression Mother     Unknown/Adopted Father     Diabetes Other     Unknown/Adopted Paternal Grandmother     C.A.D. No family hx of     Cerebrovascular Disease No family hx of     Anesthesia Reaction No family hx of     Arthritis No family hx of     Asthma No family hx of     Breast Cancer No family hx of     Cancer - colorectal No family hx of     Prostate Cancer No family hx of     Thyroid Disease No family hx of     Lipids No  family hx of     Congenital Anomalies No family hx of          Current Outpatient Medications   Medication Sig Dispense Refill    cyclobenzaprine (FLEXERIL) 10 MG tablet Take 1 tablet (10 mg) by mouth 3 times daily as needed for muscle spasms 30 tablet 1    losartan (COZAAR) 25 MG tablet Take 1 tablet (25 mg) by mouth daily 90 tablet 3    bisacodyl (DULCOLAX) 5 MG EC tablet Take 2 tablets at 3 pm the day before your procedure. If your procedure is before 11 am, take 2 additional tablets at 11 pm. If your procedure is after 11 am, take 2 additional tablets at 6 am. For additional instructions refer to your colonoscopy prep instructions. 4 tablet 0    celecoxib (CELEBREX) 100 MG capsule Take 1 capsule (100 mg) by mouth 2 times daily 60 capsule 1    chlorhexidine (PERIDEX) 0.12 % solution rinse mouth with 1/2 oz 2x a day, once in morning and once at night. spit after rinsing, do not swallow*      cyclobenzaprine (FLEXERIL) 10 MG tablet Take 1 tablet (10 mg) by mouth 3 times daily as needed for muscle spasms 90 tablet 1    dicyclomine (BENTYL) 20 MG tablet Take 1 tablet (20 mg) by mouth 4 times daily as needed (abdominal pain, loose stool) 30 tablet 0    hydrOXYzine (ATARAX) 25 MG tablet Take 1-2 tablets (25-50 mg) by mouth nightly as needed for other (insomnia) 60 tablet 1    methocarbamol (ROBAXIN) 500 MG tablet Take 1 tablet (500 mg) by mouth 4 times daily as needed for muscle spasms 30 tablet 0    polyethylene glycol (GOLYTELY) 236 g suspension The night before the exam at 6 pm drink an 8-ounce glass every 15 minutes until the jug is half empty. If you arrive before 11 AM: Drink the other half of the Golytely jug at 11 PM night before procedure. If you arrive after 11 AM: Drink the other half of the Golytely jug at 6 AM day of procedure. For additional instructions refer to your colonoscopy prep instructions. 4000 mL 0    rivaroxaban ANTICOAGULANT (XARELTO) 10 MG TABS tablet Take by mouth daily (with dinner)       "SUMAtriptan (IMITREX) 50 MG tablet Take 1 tablet (50 mg) by mouth at onset of headache for migraine May repeat in 2 hours. Max 4 tablets/24 hours. 20 tablet 1     Allergies   Allergen Reactions    Chantix [Varenicline Tartrate] Anxiety     Recent Labs   Lab Test 09/05/23  1046 04/13/23  1409 12/08/22  1129 11/10/21  1049 11/10/21  1049 11/05/20  0925 12/10/19  1227   A1C  --  5.0 5.4  --  5.0 4.9  --    LDL  --   --  133*  --  144* 130* 125*   HDL  --   --  65  --  56 59 60   TRIG  --   --  107  --  96 71 93   ALT <5 21 22   < > 20 20 21   CR 0.69 0.69 0.60   < > 0.66 0.58 0.64   GFRESTIMATED >90 >90 >90   < > >90 >90 >90   GFRESTBLACK  --   --   --   --   --  >90 >90   POTASSIUM 4.2 4.7 5.0   < > 4.9 3.6 3.5   TSH 1.10 0.89  --    < > 0.98 0.76 0.62    < > = values in this interval not displayed.              Review of Systems  Constitutional, neuro, ENT, endocrine, pulmonary, cardiac, gastrointestinal, genitourinary, musculoskeletal, integument and psychiatric systems are negative, except as otherwise noted.      Objective    /88 (BP Location: Right arm, Patient Position: Sitting, Cuff Size: Adult Regular)   Pulse 72   Temp 98.2  F (36.8  C) (Oral)   Resp 12   Ht 1.702 m (5' 7\")   Wt 90.1 kg (198 lb 9.6 oz)   SpO2 100%   BMI 31.11 kg/m    Body mass index is 31.11 kg/m .  Physical Exam   GENERAL: Patient is well nourished, well developed, obese,in no apparent distress, non-toxic, in no respiratory distress and acyanotic, alert, cooperative, and well hydrated  EYES: Eyes grossly normal to inspection and conjunctivae and sclerae normal  HENT:ear canals and TM's normal and nose and mouth without ulcers or lesions   JAW: TMJ on the bilateral  side is tender and a palpal clunk is Not  noted.  Masseter and temporalis normal.  NECK:normal and supple  CARDIAC:regular rates and rhythm, no murmur, click or rub, and no irregular beats  without LE edema bilaterally  RESP: normal respiratory rate and rhythm, lungs " clear to auscultation  unlabored respirations, no intercostal retractions or accessory muscle use  ABD:soft, nontender  SKIN: Skin color, texture, turgor normal. No rashes or lesions.  MS: extremities normal- no gross deformities noted, gait normal, and normal muscle tone  NEURO: Normal strength and tone, sensory exam grossly normal, mentation intact, and speech normal  PSYCH: Alert, oriented, thought content appropriate,mentation appears normal., affect and mood normal      Results for orders placed or performed in visit on 05/14/24   Lipid panel reflex to direct LDL Fasting     Status: None   Result Value Ref Range    Cholesterol 178 <200 mg/dL    Triglycerides 133 <150 mg/dL    Direct Measure HDL 60 >=50 mg/dL    LDL Cholesterol Calculated 91 <=100 mg/dL    Non HDL Cholesterol 118 <130 mg/dL    Patient Fasting > 8hrs? Yes     Narrative    Cholesterol  Desirable:  <200 mg/dL    Triglycerides  Normal:  Less than 150 mg/dL  Borderline High:  150-199 mg/dL  High:  200-499 mg/dL  Very High:  Greater than or equal to 500 mg/dL    Direct Measure HDL  Female:  Greater than or equal to 50 mg/dL   Male:  Greater than or equal to 40 mg/dL    LDL Cholesterol  Desirable:  <100mg/dL  Above Desirable:  100-129 mg/dL   Borderline High:  130-159 mg/dL   High:  160-189 mg/dL   Very High:  >= 190 mg/dL    Non HDL Cholesterol  Desirable:  130 mg/dL  Above Desirable:  130-159 mg/dL  Borderline High:  160-189 mg/dL  High:  190-219 mg/dL  Very High:  Greater than or equal to 220 mg/dL   CBC with platelets     Status: Abnormal   Result Value Ref Range    WBC Count 9.0 4.0 - 11.0 10e3/uL    RBC Count 3.86 3.80 - 5.20 10e6/uL    Hemoglobin 11.6 (L) 11.7 - 15.7 g/dL    Hematocrit 35.0 35.0 - 47.0 %    MCV 91 78 - 100 fL    MCH 30.1 26.5 - 33.0 pg    MCHC 33.1 31.5 - 36.5 g/dL    RDW 13.9 10.0 - 15.0 %    Platelet Count 333 150 - 450 10e3/uL   Comprehensive metabolic panel     Status: None   Result Value Ref Range    Sodium 140 135 - 145  mmol/L    Potassium 3.7 3.4 - 5.3 mmol/L    Carbon Dioxide (CO2) 25 22 - 29 mmol/L    Anion Gap 11 7 - 15 mmol/L    Urea Nitrogen 12.9 6.0 - 20.0 mg/dL    Creatinine 0.74 0.51 - 0.95 mg/dL    GFR Estimate >90 >60 mL/min/1.73m2    Calcium 9.3 8.6 - 10.0 mg/dL    Chloride 104 98 - 107 mmol/L    Glucose 84 70 - 99 mg/dL    Alkaline Phosphatase 54 40 - 150 U/L    AST 14 0 - 45 U/L    ALT 10 0 - 50 U/L    Protein Total 6.5 6.4 - 8.3 g/dL    Albumin 4.2 3.5 - 5.2 g/dL    Bilirubin Total 0.3 <=1.2 mg/dL    Patient Fasting > 8hrs? Yes    Hemoglobin A1c     Status: Normal   Result Value Ref Range    Hemoglobin A1C 5.1 0.0 - 5.6 %   TSH with free T4 reflex     Status: Normal   Result Value Ref Range    TSH 2.50 0.30 - 4.20 uIU/mL   Albumin Random Urine Quantitative with Creat Ratio     Status: None   Result Value Ref Range    Creatinine Urine mg/dL 55.9 mg/dL    Albumin Urine mg/L <12.0 mg/L    Albumin Urine mg/g Cr     Ferritin     Status: Normal   Result Value Ref Range    Ferritin 14 6 - 175 ng/mL   Iron & Iron Binding Capacity     Status: Abnormal   Result Value Ref Range    Iron 22 (L) 37 - 145 ug/dL    Iron Binding Capacity 308 240 - 430 ug/dL    Iron Sat Index 7 (L) 15 - 46 %     Assessment & Plan     Essential hypertension  - losartan (COZAAR) 25 MG tablet  Dispense: 90 tablet; Refill: 3  - Comprehensive metabolic panel  - Albumin Random Urine Quantitative with Creat Ratio  HTN Plan:  1)  Medication: dosage change: decrease losartan to 25 mg a day  2)  Dietary sodium restriction  3)  Regular aerobic exercise  4)  Recheck in 1 month, sooner should new symptoms or   problems arise.  5) See todays orders.    Patient Education: Reviewed risks of hypertension and principles of   treatment.    Depression with anxiety  Risks and benefits of medication(s) reviewed with patient.  Questions answered.  Counseling advised  Patient instructed to call for significant side effects medications or problems  Patient advised immediate  "presentation to hospital for suicidal thought, etc.      CARDIOVASCULAR SCREENING; LDL GOAL LESS THAN 160  - - Lipid panel reflex to direct LDL Fasting    Screening for disorder of blood and blood-forming organs  - CBC with platelets    Screening for thyroid disorder  - TSH with free T4 reflex    Screening for diabetes mellitus (DM)  - Comprehensive metabolic panel  - Hemoglobin A1c    TMJ (temporomandibular joint syndrome)   MR Temporomandibular Joints  Will follow up and/or notify patient of  results via My Chart to determine further need for followup   - Pain Management  Referral  Will Start:  - cyclobenzaprine (FLEXERIL) 10 MG tablet  Dispense: 30 tablet; Refill: 1  - traMADol (ULTRAM) 50 MG tablet  Dispense: 10 tablet; Refill: 0    Anemia, unspecified type  Will follow up and/or notify patient of  results via My Chart to determine further need for followup   -- Ferritin  - Iron & Iron Binding Capacity    Class 1 obesity due to excess calories with serious comorbidity and body mass index (BMI) of 33.0 to 33.9 in adult  Healthy diet and exercise reviewed.  Limit pop and juice intake.  Risks of obesity discussed.  Encourage exercise.  Limit TV/Computer/game time to one hour a day.        Review of external notes as documented elsewhere in note  Ordering of each unique test  Prescription drug management  No LOS data to display   Time spent by me doing chart review, history and exam, documentation and further activities per the note      BMI  Estimated body mass index is 31.11 kg/m  as calculated from the following:    Height as of this encounter: 1.702 m (5' 7\").    Weight as of this encounter: 90.1 kg (198 lb 9.6 oz).   Weight management plan: Discussed healthy diet and exercise guidelines      See Patient Instructions  Patient Instructions   PLAN:   1.   Symptomatic therapy suggested: decrease losartan to 25 mg a day .  2.  Orders Placed This Encounter   Medications    losartan (COZAAR) 25 MG tablet     " Sig: Take 1 tablet (25 mg) by mouth daily     Dispense:  90 tablet     Refill:  3    cyclobenzaprine (FLEXERIL) 10 MG tablet     Sig: Take 1 tablet (10 mg) by mouth 3 times daily as needed for muscle spasms     Dispense:  30 tablet     Refill:  1    traMADol (ULTRAM) 50 MG tablet     Sig: Take 1 tablet (50 mg) by mouth every 6 hours as needed for severe pain     Dispense:  10 tablet     Refill:  0     Orders Placed This Encounter   Procedures    REVIEW OF HEALTH MAINTENANCE PROTOCOL ORDERS    MR Temporomandibular Joints    Lipid panel reflex to direct LDL Fasting    CBC with platelets    Comprehensive metabolic panel    Hemoglobin A1c    TSH with free T4 reflex    Albumin Random Urine Quantitative with Creat Ratio    Pain Management  Referral       3.  CONSULTATION/REFERRAL to head and neck clinic   MRI TMJ   I will place order. Please call [phone] to schedule.  Will follow up and/or notify patient of  results via My Chart to determine further need for followup    Patient needs to follow up in if no improvement,or sooner if worsening of symptoms or other symptoms develop.              Signed Electronically by: MEENAKSHI Ramos CNP

## 2024-05-15 LAB
ALBUMIN SERPL BCG-MCNC: 4.2 G/DL (ref 3.5–5.2)
ALP SERPL-CCNC: 54 U/L (ref 40–150)
ALT SERPL W P-5'-P-CCNC: 10 U/L (ref 0–50)
ANION GAP SERPL CALCULATED.3IONS-SCNC: 11 MMOL/L (ref 7–15)
AST SERPL W P-5'-P-CCNC: 14 U/L (ref 0–45)
BILIRUB SERPL-MCNC: 0.3 MG/DL
BUN SERPL-MCNC: 12.9 MG/DL (ref 6–20)
CALCIUM SERPL-MCNC: 9.3 MG/DL (ref 8.6–10)
CHLORIDE SERPL-SCNC: 104 MMOL/L (ref 98–107)
CHOLEST SERPL-MCNC: 178 MG/DL
CREAT SERPL-MCNC: 0.74 MG/DL (ref 0.51–0.95)
CREAT UR-MCNC: 55.9 MG/DL
DEPRECATED HCO3 PLAS-SCNC: 25 MMOL/L (ref 22–29)
EGFRCR SERPLBLD CKD-EPI 2021: >90 ML/MIN/1.73M2
FASTING STATUS PATIENT QL REPORTED: YES
FASTING STATUS PATIENT QL REPORTED: YES
GLUCOSE SERPL-MCNC: 84 MG/DL (ref 70–99)
HDLC SERPL-MCNC: 60 MG/DL
LDLC SERPL CALC-MCNC: 91 MG/DL
MICROALBUMIN UR-MCNC: <12 MG/L
MICROALBUMIN/CREAT UR: NORMAL MG/G{CREAT}
NONHDLC SERPL-MCNC: 118 MG/DL
POTASSIUM SERPL-SCNC: 3.7 MMOL/L (ref 3.4–5.3)
PROT SERPL-MCNC: 6.5 G/DL (ref 6.4–8.3)
SODIUM SERPL-SCNC: 140 MMOL/L (ref 135–145)
TRIGL SERPL-MCNC: 133 MG/DL
TSH SERPL DL<=0.005 MIU/L-ACNC: 2.5 UIU/ML (ref 0.3–4.2)

## 2024-05-16 LAB
FERRITIN SERPL-MCNC: 14 NG/ML (ref 6–175)
IRON BINDING CAPACITY (ROCHE): 308 UG/DL (ref 240–430)
IRON SATN MFR SERPL: 7 % (ref 15–46)
IRON SERPL-MCNC: 22 UG/DL (ref 37–145)

## 2024-05-16 NOTE — RESULT ENCOUNTER NOTE
Martinez Lopes,    Attached are your test results.  -Hemoglobin is decreased indicating anemia.  Checking your iron levels   Are you menses heavy did you just have it?   -White blood cell and platelet counts are normal.  -Cholesterol levels (LDL,HDL, Triglycerides) are normal.  ADVISE: rechecking in 1 year.   -Liver and gallbladder tests are normal (ALT,AST, Alk phos, bilirubin), kidney function is normal (Cr, GFR), sodium is normal, potassium is normal, calcium is normal, glucose is normal.  -TSH (thyroid stimulating hormone) level is normal which indicates normal thyroid function.  -Microalbumin (urine protein) test is normal.  ADVISE: rechecking this annually.   Please contact us if you have any questions.    Miguelina Bledsoe, CNP

## 2024-05-16 NOTE — RESULT ENCOUNTER NOTE
Martinez Lopes,    Attached are your test results.  -Low iron store levels (ferritin).  ADVISE:let  me know about your menses    Please contact us if you have any questions.    Miguelina Bledsoe, CNP

## 2024-05-21 PROBLEM — E66.01 MORBID OBESITY (H): Status: RESOLVED | Noted: 2021-12-20 | Resolved: 2024-05-21

## 2024-06-29 ENCOUNTER — HEALTH MAINTENANCE LETTER (OUTPATIENT)
Age: 49
End: 2024-06-29

## 2024-09-05 DIAGNOSIS — M62.838 SPASM OF MUSCLE: ICD-10-CM

## 2024-09-05 RX ORDER — CYCLOBENZAPRINE HCL 10 MG
10 TABLET ORAL 3 TIMES DAILY PRN
Qty: 90 TABLET | Refills: 1 | Status: SHIPPED | OUTPATIENT
Start: 2024-09-05

## 2025-01-20 DIAGNOSIS — M62.838 SPASM OF MUSCLE: ICD-10-CM

## 2025-01-20 RX ORDER — CYCLOBENZAPRINE HCL 10 MG
10 TABLET ORAL 3 TIMES DAILY PRN
Qty: 90 TABLET | Refills: 1 | Status: SHIPPED | OUTPATIENT
Start: 2025-01-20

## 2025-04-23 ENCOUNTER — OFFICE VISIT (OUTPATIENT)
Dept: FAMILY MEDICINE | Facility: CLINIC | Age: 50
End: 2025-04-23
Payer: COMMERCIAL

## 2025-04-23 VITALS
SYSTOLIC BLOOD PRESSURE: 120 MMHG | BODY MASS INDEX: 28.2 KG/M2 | DIASTOLIC BLOOD PRESSURE: 79 MMHG | WEIGHT: 179.7 LBS | OXYGEN SATURATION: 99 % | HEIGHT: 67 IN | HEART RATE: 89 BPM | TEMPERATURE: 98.6 F | RESPIRATION RATE: 22 BRPM

## 2025-04-23 DIAGNOSIS — R10.9 RIGHT FLANK PAIN: ICD-10-CM

## 2025-04-23 DIAGNOSIS — R31.9 HEMATURIA, UNSPECIFIED TYPE: ICD-10-CM

## 2025-04-23 DIAGNOSIS — I10 ESSENTIAL HYPERTENSION: Primary | ICD-10-CM

## 2025-04-23 DIAGNOSIS — R10.31 RIGHT LOWER QUADRANT PAIN: ICD-10-CM

## 2025-04-23 LAB
ALBUMIN UR-MCNC: NEGATIVE MG/DL
AMORPH CRY #/AREA URNS HPF: ABNORMAL /HPF
APPEARANCE UR: CLEAR
BACTERIA #/AREA URNS HPF: ABNORMAL /HPF
BASOPHILS # BLD AUTO: 0.1 10E3/UL (ref 0–0.2)
BASOPHILS NFR BLD AUTO: 1 %
BILIRUB UR QL STRIP: NEGATIVE
COLOR UR AUTO: YELLOW
EOSINOPHIL # BLD AUTO: 0.1 10E3/UL (ref 0–0.7)
EOSINOPHIL NFR BLD AUTO: 2 %
ERYTHROCYTE [DISTWIDTH] IN BLOOD BY AUTOMATED COUNT: 13 % (ref 10–15)
GLUCOSE UR STRIP-MCNC: NEGATIVE MG/DL
HCT VFR BLD AUTO: 40.7 % (ref 35–47)
HGB BLD-MCNC: 13.6 G/DL (ref 11.7–15.7)
HGB UR QL STRIP: ABNORMAL
IMM GRANULOCYTES # BLD: 0 10E3/UL
IMM GRANULOCYTES NFR BLD: 0 %
KETONES UR STRIP-MCNC: NEGATIVE MG/DL
LEUKOCYTE ESTERASE UR QL STRIP: ABNORMAL
LYMPHOCYTES # BLD AUTO: 2.2 10E3/UL (ref 0.8–5.3)
LYMPHOCYTES NFR BLD AUTO: 33 %
MCH RBC QN AUTO: 31.1 PG (ref 26.5–33)
MCHC RBC AUTO-ENTMCNC: 33.4 G/DL (ref 31.5–36.5)
MCV RBC AUTO: 93 FL (ref 78–100)
MONOCYTES # BLD AUTO: 0.5 10E3/UL (ref 0–1.3)
MONOCYTES NFR BLD AUTO: 7 %
MUCOUS THREADS #/AREA URNS LPF: PRESENT /LPF
NEUTROPHILS # BLD AUTO: 3.9 10E3/UL (ref 1.6–8.3)
NEUTROPHILS NFR BLD AUTO: 58 %
NITRATE UR QL: NEGATIVE
PH UR STRIP: 5.5 [PH] (ref 5–7)
PLATELET # BLD AUTO: 300 10E3/UL (ref 150–450)
RBC # BLD AUTO: 4.37 10E6/UL (ref 3.8–5.2)
RBC #/AREA URNS AUTO: ABNORMAL /HPF
SP GR UR STRIP: >=1.03 (ref 1–1.03)
SQUAMOUS #/AREA URNS AUTO: ABNORMAL /LPF
UROBILINOGEN UR STRIP-ACNC: 0.2 E.U./DL
WBC # BLD AUTO: 6.8 10E3/UL (ref 4–11)
WBC #/AREA URNS AUTO: ABNORMAL /HPF

## 2025-04-23 PROCEDURE — 36415 COLL VENOUS BLD VENIPUNCTURE: CPT | Performed by: INTERNAL MEDICINE

## 2025-04-23 PROCEDURE — 3074F SYST BP LT 130 MM HG: CPT | Performed by: INTERNAL MEDICINE

## 2025-04-23 PROCEDURE — 3078F DIAST BP <80 MM HG: CPT | Performed by: INTERNAL MEDICINE

## 2025-04-23 PROCEDURE — 85025 COMPLETE CBC W/AUTO DIFF WBC: CPT | Performed by: INTERNAL MEDICINE

## 2025-04-23 PROCEDURE — 81001 URINALYSIS AUTO W/SCOPE: CPT | Performed by: INTERNAL MEDICINE

## 2025-04-23 PROCEDURE — 99214 OFFICE O/P EST MOD 30 MIN: CPT | Performed by: INTERNAL MEDICINE

## 2025-04-23 RX ORDER — TIZANIDINE 2 MG/1
2-4 TABLET ORAL 2 TIMES DAILY PRN
Qty: 90 TABLET | Refills: 0 | Status: SHIPPED | OUTPATIENT
Start: 2025-04-23

## 2025-04-23 RX ORDER — GABAPENTIN 100 MG/1
100 CAPSULE ORAL 3 TIMES DAILY
Qty: 90 CAPSULE | Refills: 0 | Status: SHIPPED | OUTPATIENT
Start: 2025-04-23

## 2025-04-23 RX ORDER — CELECOXIB 100 MG/1
100 CAPSULE ORAL 2 TIMES DAILY
Qty: 60 CAPSULE | Refills: 0 | Status: SHIPPED | OUTPATIENT
Start: 2025-04-23

## 2025-04-23 NOTE — PROGRESS NOTES
Assessment & Plan     Essential hypertension  Her blood pressure is under good control with the current regimen of losartan 20 mg  - CBC with platelets and differential; Future  - CBC with platelets and differential    Right flank pain  She has been having some right flank pain for the last 1 month  This is slowly getting worse  On the 19th of this month the pain was so bad that she has to go to the ER  She tells me the pain is like a sharp stabbing pain and is on a scale of 10 it was almost 7 on the day  She went to the ER where she had CT abdomen and also some lab work done  All of that was normal except for some renal calculus on the left side and she had a incidental fibroid noted  UA showed some microscopic hematuria but no infection  She continues to have this pain  CT also showed that she has spondylosis of her spine  I wonder if the pain is radiating from there  Additionally she is also having some right lower quadrant abdominal pain  She has some tenderness in the right groin area  No McBurney point tenderness  Because of this right flank pain still is unclear but I think that this is musculoskeletal in nature rather than original pain  - UA Macroscopic with reflex to Microscopic and Culture - Lab Collect; Future  - UA Macroscopic with reflex to Microscopic and Culture - Lab Collect  - UA Microscopic with Reflex to Culture    Right lower quadrant pain  As stated above she has additionally some right lower quadrant abdominal pain  The pain according to her is in the right groin  There is no McBurney point tenderness  She has no diarrhea  She has no nausea  She is complaining of increased urination but no dysuria  She tells me in the past when she had UTI she only had dysuria but no increased urination  Again it is not clear what the etiology of this pain is  Pelvic ultrasound only showed incidental fibroid which I do not think is causing this pain  We will start her on some gabapentin and I told her to  "slowly titrate the dose up to 300 mg 3 times a day  Start Celebrex  Advised not to take any ibuprofen along with this  Also she did not respond to Flexeril or Robaxin so we can try some tizanidine  Discussed about the side effects of these medications  - celecoxib (CELEBREX) 100 MG capsule; Take 1 capsule (100 mg) by mouth 2 times daily.  - gabapentin (NEURONTIN) 100 MG capsule; Take 1 capsule (100 mg) by mouth 3 times daily.  - UA Macroscopic with reflex to Microscopic and Culture - Lab Collect; Future  - tiZANidine (ZANAFLEX) 2 MG tablet; Take 1-2 tablets (2-4 mg) by mouth 2 times daily as needed for muscle spasms.    Hematuria, unspecified type  She has microscopic hematuria today and again she told me that she is not having any menstruation  She had microscopic materia in the past in the ER but no microscopic  hematuria  CT abdomen has been normal  We need to recheck the UA in two 1 week and if there is still hematuria then she will need a urology referral  - UA Macroscopic with reflex to Microscopic and Culture - Lab Collect; Future        MED REC REQUIRED  Post Medication Reconciliation Status: discharge medications reconciled and changed, per note/orders  BMI  Estimated body mass index is 28.54 kg/m  as calculated from the following:    Height as of this encounter: 1.69 m (5' 6.54\").    Weight as of this encounter: 81.5 kg (179 lb 11.2 oz).             Julio Betts is a 50 year old, presenting for the following health issues:  Hospital F/U        4/23/2025     1:49 PM   Additional Questions   Roomed by Lacy PANTOJA        ED/UC Followup:    Facility:  [facility]   Date of visit: 4/19/2025  Reason for visit: Pain on right side  Current Status: Getting worse and pain is going down to abdomen         Review of Systems  Constitutional, HEENT, cardiovascular, pulmonary, gi and gu systems are negative, except as otherwise noted.      Objective    /79 (BP Location: Right arm, Patient Position: " "Sitting, Cuff Size: Adult Large)   Pulse 89   Temp 98.6  F (37  C) (Oral)   Resp 22   Ht 1.69 m (5' 6.54\")   Wt 81.5 kg (179 lb 11.2 oz)   LMP 04/16/2025 (Exact Date)   SpO2 99%   BMI 28.54 kg/m    Body mass index is 28.54 kg/m .  Physical Exam   GENERAL: alert and no distress  NECK: no adenopathy, no asymmetry, masses, or scars  RESP: lungs clear to auscultation - no rales, rhonchi or wheezes  CV: regular rate and rhythm, normal S1 S2, no S3 or S4, no murmur, click or rub, no peripheral edema  ABDOMEN: She is tender on palpation of the right inguinal region however there is no guarding or rigidity  MS:   She has no tenderness on palpation of the spine  She is tender on palpation of the right CVA angle and the pain is worse on flexion and extension of her right hip  NEURO: Normal strength and tone, mentation intact and speech normal  PSYCH: mentation appears normal, affect normal/bright            Signed Electronically by: Mitul Pennington MD    "

## 2025-04-30 ENCOUNTER — LAB (OUTPATIENT)
Dept: LAB | Facility: CLINIC | Age: 50
End: 2025-04-30
Payer: COMMERCIAL

## 2025-04-30 DIAGNOSIS — R31.9 HEMATURIA, UNSPECIFIED TYPE: ICD-10-CM

## 2025-04-30 DIAGNOSIS — R10.31 RIGHT LOWER QUADRANT PAIN: ICD-10-CM

## 2025-04-30 LAB
ALBUMIN UR-MCNC: NEGATIVE MG/DL
APPEARANCE UR: CLEAR
BACTERIA #/AREA URNS HPF: ABNORMAL /HPF
BILIRUB UR QL STRIP: NEGATIVE
COLOR UR AUTO: YELLOW
GLUCOSE UR STRIP-MCNC: NEGATIVE MG/DL
HGB UR QL STRIP: ABNORMAL
KETONES UR STRIP-MCNC: NEGATIVE MG/DL
LEUKOCYTE ESTERASE UR QL STRIP: ABNORMAL
NITRATE UR QL: NEGATIVE
PH UR STRIP: 7 [PH] (ref 5–7)
RBC #/AREA URNS AUTO: ABNORMAL /HPF
SP GR UR STRIP: 1.01 (ref 1–1.03)
SQUAMOUS #/AREA URNS AUTO: ABNORMAL /LPF
UROBILINOGEN UR STRIP-ACNC: 0.2 E.U./DL
WBC #/AREA URNS AUTO: ABNORMAL /HPF

## 2025-04-30 PROCEDURE — 81001 URINALYSIS AUTO W/SCOPE: CPT

## 2025-05-01 ENCOUNTER — PATIENT OUTREACH (OUTPATIENT)
Dept: CARE COORDINATION | Facility: CLINIC | Age: 50
End: 2025-05-01
Payer: COMMERCIAL

## 2025-05-21 ENCOUNTER — OFFICE VISIT (OUTPATIENT)
Dept: UROLOGY | Facility: CLINIC | Age: 50
End: 2025-05-21
Attending: INTERNAL MEDICINE
Payer: COMMERCIAL

## 2025-05-21 ENCOUNTER — TRANSFERRED RECORDS (OUTPATIENT)
Dept: HEALTH INFORMATION MANAGEMENT | Facility: CLINIC | Age: 50
End: 2025-05-21

## 2025-05-21 VITALS
WEIGHT: 175 LBS | SYSTOLIC BLOOD PRESSURE: 125 MMHG | BODY MASS INDEX: 27.79 KG/M2 | HEART RATE: 98 BPM | DIASTOLIC BLOOD PRESSURE: 77 MMHG

## 2025-05-21 DIAGNOSIS — R31.9 HEMATURIA, UNSPECIFIED TYPE: ICD-10-CM

## 2025-05-21 LAB
ALBUMIN UR-MCNC: ABNORMAL MG/DL
APPEARANCE UR: CLEAR
BACTERIA #/AREA URNS HPF: ABNORMAL /HPF
BILIRUB UR QL STRIP: NEGATIVE
COLOR UR AUTO: YELLOW
GLUCOSE UR STRIP-MCNC: NEGATIVE MG/DL
HGB UR QL STRIP: ABNORMAL
KETONES UR STRIP-MCNC: NEGATIVE MG/DL
LEUKOCYTE ESTERASE UR QL STRIP: NEGATIVE
NITRATE UR QL: NEGATIVE
PH UR STRIP: 8.5 [PH] (ref 5–7)
RBC #/AREA URNS AUTO: ABNORMAL /HPF
SP GR UR STRIP: 1.01 (ref 1–1.03)
SQUAMOUS #/AREA URNS AUTO: ABNORMAL /LPF
UROBILINOGEN UR STRIP-ACNC: 1 E.U./DL
WBC #/AREA URNS AUTO: ABNORMAL /HPF

## 2025-05-21 PROCEDURE — 3074F SYST BP LT 130 MM HG: CPT | Performed by: UROLOGY

## 2025-05-21 PROCEDURE — 81001 URINALYSIS AUTO W/SCOPE: CPT | Performed by: UROLOGY

## 2025-05-21 PROCEDURE — 3078F DIAST BP <80 MM HG: CPT | Performed by: UROLOGY

## 2025-05-21 PROCEDURE — 99203 OFFICE O/P NEW LOW 30 MIN: CPT | Performed by: UROLOGY

## 2025-05-21 NOTE — PROGRESS NOTES
"Roxane Lopes is a 50 year old female seen in consultation for microhematuria. Consult from Miguelina Bledsoe.        4/19/25 CT abd/pelvis w/o contrast: 2 mm left renal stone without obstruction  4/23/25 Pelvic SUZANNE: small uterine fibroid      5/2/25  ED for right flank and lower abd pain  CT abd/pelvis w/o contrast: 3 mm stone lower left kidney  DX: pain likely musculoskeletal       Today denies dysuria, gross hematuria, daytime frequency, incontinence; noc x 1.    No personal or solid FH of nephrolithiasis or other  disorder. Pt is non-smoker since last year; prior x 20+ years. Works as a pharmacy tech.    Denies prior  eval.    Hx 4 vag deliveries, now perimenapausal.     BM's are satisfactory.    Drinks 2 caf coffee, 6 [place] per day.    Likes salt at the table because \"it tastes good.\"      Reviewed PMH in detail including HTN, depression, DVT, obesity, GERD, migrane, varicose veins, fatigue, anxiety, hx DVT, tobacco use, PTSD, abscess of coccyx    Past Medical History:   Diagnosis Date    Benign essential hypertension     Depression     DVT (deep venous thrombosis) (H) 04/2011    left leg, was on lovenox and coumadin but have been off since November 2011    Essential hypertension 12/20/2021    H/O tubal ligation     2006     Obesity     Post-traumatic osteoarthritis of right shoulder 5/5/2022    Prediabetes        Past Surgical History:   Procedure Laterality Date    COLONOSCOPY N/A 9/26/2023    Procedure: COLONOSCOPY, WITH POLYPECTOMY AND BIOPSY;  Surgeon: Kameron Vasquez MD;  Location: [facility] OR    COLONOSCOPY WITH CO2 INSUFFLATION N/A 9/26/2023    Procedure: Colonoscopy with CO2 insufflation;  Surgeon: Kameron Vasquez MD;  Location: [facility] OR    EXAM UNDER ANESTHESIA ANUS N/A 4/30/2021    Procedure: EXAM UNDER ANESTHESIA;  Surgeon: Leandro Charles MD;  Location: [facility] OR    EXCISE LESION RECTUM N/A 4/30/2021    Procedure: Excision of coccygeal cyst;  Surgeon: Leandro Charles MD;  Location: [facility] OR    " LAPAROSCOPIC CHOLECYSTECTOMY  10/9/2012    Procedure: LAPAROSCOPIC CHOLECYSTECTOMY;  Laparoscopic Cholecystectomy;  Surgeon: Martell Briscoe MD;  Location: [facility] OR    LAPAROSCOPIC TUBAL LIGATION      PROCTOSCOPY N/A 2021    Procedure: Proctoscopy;  Surgeon: Leandro Charles MD;  Location: [facility] OR       Social History     Socioeconomic History    Marital status:      Spouse name: Not on file    Number of children: 4    Years of education: Not on file    Highest education level: Not on file   Occupational History    Occupation: pharmacy tech   Tobacco Use    Smoking status: Former     Current packs/day: 0.00     Average packs/day: 0.5 packs/day for 10.0 years (5.0 ttl pk-yrs)     Types: Cigarettes, Other     Start date: 10/16/2002     Quit date: 10/16/2012     Years since quittin.6     Passive exposure: Past    Smokeless tobacco: Never    Tobacco comments:     Vaping now    Vaping Use    Vaping status: Former   Substance and Sexual Activity    Alcohol use: Yes     Comment: Maybe once a year    Drug use: No    Sexual activity: Yes     Partners: Male     Birth control/protection: Surgical     Comment:  one partner   Other Topics Concern    Parent/sibling w/ CABG, MI or angioplasty before 65F 55M? No   Social History Narrative    Not on file     Social Drivers of Health     Financial Resource Strain: Not on file   Food Insecurity: Not on file   Transportation Needs: Not on file   Physical Activity: Not on file   Stress: Not on file   Social Connections: Not on file   Interpersonal Safety: Not At Risk (2025)    Received from [facility]     Humiliation, Afraid, Rape, and Kick questionnaire     Fear of Current or Ex-Partner: No     Emotionally Abused: No     Physically Abused: No     Sexually Abused: No   Housing Stability: Not on file       Current Outpatient Medications   Medication Sig Dispense Refill    bisacodyl (DULCOLAX) 5 MG EC tablet Take 2 tablets at 3 pm the day before your  procedure. If your procedure is before 11 am, take 2 additional tablets at 11 pm. If your procedure is after 11 am, take 2 additional tablets at 6 am. For additional instructions refer to your colonoscopy prep instructions. 4 tablet 0    celecoxib (CELEBREX) 100 MG capsule Take 1 capsule (100 mg) by mouth 2 times daily. 60 capsule 0    celecoxib (CELEBREX) 100 MG capsule Take 1 capsule (100 mg) by mouth 2 times daily 60 capsule 1    chlorhexidine (PERIDEX) 0.12 % solution rinse mouth with 1/2 oz 2x a day, once in morning and once at night. spit after rinsing, do not swallow*      cyclobenzaprine (FLEXERIL) 10 MG tablet Take 1 tablet (10 mg) by mouth 3 times daily as needed for muscle spasms. 90 tablet 1    cyclobenzaprine (FLEXERIL) 10 MG tablet Take 1 tablet (10 mg) by mouth 3 times daily as needed for muscle spasms 30 tablet 1    dicyclomine (BENTYL) 20 MG tablet Take 1 tablet (20 mg) by mouth 4 times daily as needed (abdominal pain, loose stool) 30 tablet 0    gabapentin (NEURONTIN) 100 MG capsule Take 1 capsule (100 mg) by mouth 3 times daily. 90 capsule 0    hydrOXYzine (ATARAX) 25 MG tablet Take 1-2 tablets (25-50 mg) by mouth nightly as needed for other (insomnia) 60 tablet 1    losartan (COZAAR) 25 MG tablet Take 1 tablet (25 mg) by mouth daily 90 tablet 3    methocarbamol (ROBAXIN) 500 MG tablet Take 1 tablet (500 mg) by mouth 4 times daily as needed for muscle spasms 30 tablet 0    nicotine (NICODERM CQ) 14 MG/24HR 24 hr patch Place 1 patch onto the skin every 24 hours 28 patch 3    polyethylene glycol (GOLYTELY) 236 g suspension The night before the exam at 6 pm drink an 8-ounce glass every 15 minutes until the jug is half empty. If you arrive before 11 AM: Drink the other half of the [employer] jug at 11 PM night before procedure. If you arrive after 11 AM: Drink the other half of the [employer] jug at 6 AM day of procedure. For additional instructions refer to your colonoscopy prep instructions. 4000 mL 0     SUMAtriptan (IMITREX) 50 MG tablet Take 1 tablet (50 mg) by mouth at onset of headache for migraine May repeat in 2 hours. Max 4 tablets/24 hours. 20 tablet 1    tiZANidine (ZANAFLEX) 2 MG tablet Take 1-2 tablets (2-4 mg) by mouth 2 times daily as needed for muscle spasms. 90 tablet 0       Physical Exam:    GENL: NAD. (Remainder of exam deferred)                             Today:    Results for orders placed or performed in visit on 05/21/25   Urine Macroscopic with reflex to Microscopic     Status: Abnormal   Result Value Ref Range    Color Urine Yellow Colorless, Straw, Light Yellow, Yellow    Appearance Urine Clear Clear    Glucose Urine Negative Negative mg/dL    Bilirubin Urine Negative Negative    Ketones Urine Negative Negative mg/dL    Specific Gravity Urine 1.015 1.003 - 1.035    Blood Urine Trace (A) Negative    pH Urine 8.5 (H) 5.0 - 7.0    Protein Albumin Urine Trace (A) Negative mg/dL    Urobilinogen Urine 1.0 0.2, 1.0 E.U./dL    Nitrite Urine Negative Negative    Leukocyte Esterase Urine Negative Negative   Urine Microscopic Exam     Status: Abnormal   Result Value Ref Range    Bacteria Urine Few (A) None Seen /HPF    RBC Urine 2-5 (A) 0-2 /HPF /HPF    WBC Urine 0-5 0-5 /HPF /HPF    Squamous Epithelials Urine Few (A) None Seen /LPF           IMP:  1. Minimal microhematuria  2. Small, assymptomatic left renal stone      PLAN:  Discussed situation with patient in detail.  Consider continuing with fair bit of water, minimize salt  Continue to refrain from tobacco  Check UA annually; RTC for significant progression of microhematuria or development of gross hematuria at any time  Total time spent in reviewing patient's previous records, discussion with patient and documentation: 30 minutes

## 2025-06-02 DIAGNOSIS — M26.609 TMJ (TEMPOROMANDIBULAR JOINT SYNDROME): Primary | ICD-10-CM

## 2025-06-02 RX ORDER — TIZANIDINE 2 MG/1
2-4 TABLET ORAL 2 TIMES DAILY PRN
Qty: 90 TABLET | Refills: 0 | Status: SHIPPED | OUTPATIENT
Start: 2025-06-02 | End: 2025-07-30

## 2025-07-09 ENCOUNTER — TRANSFERRED RECORDS (OUTPATIENT)
Dept: HEALTH INFORMATION MANAGEMENT | Facility: CLINIC | Age: 50
End: 2025-07-09

## 2025-07-09 ENCOUNTER — OFFICE VISIT (OUTPATIENT)
Dept: URGENT CARE | Facility: URGENT CARE | Age: 50
End: 2025-07-09
Payer: COMMERCIAL

## 2025-07-09 VITALS
BODY MASS INDEX: 28.11 KG/M2 | RESPIRATION RATE: 18 BRPM | HEART RATE: 73 BPM | DIASTOLIC BLOOD PRESSURE: 77 MMHG | SYSTOLIC BLOOD PRESSURE: 120 MMHG | TEMPERATURE: 97.9 F | WEIGHT: 177 LBS | OXYGEN SATURATION: 99 %

## 2025-07-09 DIAGNOSIS — M54.41 ACUTE RIGHT-SIDED LOW BACK PAIN WITH RIGHT-SIDED SCIATICA: Primary | ICD-10-CM

## 2025-07-09 PROCEDURE — 99213 OFFICE O/P EST LOW 20 MIN: CPT | Performed by: PHYSICIAN ASSISTANT

## 2025-07-09 PROCEDURE — 1125F AMNT PAIN NOTED PAIN PRSNT: CPT | Performed by: PHYSICIAN ASSISTANT

## 2025-07-09 PROCEDURE — 3074F SYST BP LT 130 MM HG: CPT | Performed by: PHYSICIAN ASSISTANT

## 2025-07-09 PROCEDURE — 3078F DIAST BP <80 MM HG: CPT | Performed by: PHYSICIAN ASSISTANT

## 2025-07-09 ASSESSMENT — PAIN SCALES - GENERAL: PAINLEVEL_OUTOF10: MODERATE PAIN (5)

## 2025-07-09 ASSESSMENT — ENCOUNTER SYMPTOMS
NUMBNESS: 0
FATIGUE: 0
NECK PAIN: 0
CARDIOVASCULAR NEGATIVE: 1
COLOR CHANGE: 0
JOINT SWELLING: 0
WOUND: 0
PALPITATIONS: 0
FEVER: 0
CHILLS: 0
NECK STIFFNESS: 0
ARTHRALGIAS: 1
BACK PAIN: 1
MYALGIAS: 1

## 2025-07-09 NOTE — PROGRESS NOTES
Julio Betts is a 50 year old, presenting for the following health issues:  Urgent Care and Musculoskeletal Problem (Patient reports she started to have an episode after picking up her granddaughter on Friday since then has been having right side neck pain, stiffness, back pain low-mainly right side)  HPI    Back Pain  Onset/Duration: 5days ago  Description:   Location of pain: low back, R side  Character of pain: stiffness  Pain radiation: bilateral legs  New numbness or weakness in legs, not attributed to pain:  Also reports weakness, numbness, and tingling.  No bladder or bowel dysfunction.  Intensity: severe  Progression of Symptoms: same  History:   Specific cause: Developed pain after lifting her 35# granddaughter over the weekend.  She has now noticed clicking sensation with movement.    Pain interferes with job: Yes  History of back problems: Yes, prior back problems with DDD  Any previous MRI or X-rays: Yes  Sees a specialist for back pain: Yes  Alleviating factors:   Improved by: rest, sitting    Precipitating factors:  Worsened by: Lifting, Bending, Standing, Sitting, Lying Flat, Walking and Coughing  Therapies tried and outcome: acetaminophen (Tylenol), rest and sitting with minimal relief  Accompanying Signs & Symptoms:  Risk of Fracture: None  Risk of Cauda Equina: None  Risk of Infection: None  Risk of Cancer: None  Risk of Ankylosing Spondylitis: Onset at age <35, male, AND morning back stiffness  no     Patient Active Problem List   Diagnosis    Varicose veins    Left leg pain    PTSD (post-traumatic stress disorder)    Tobacco use disorder    Venous insufficiency of leg-left    Prediabetes    Thumb injury    Anxiety    Gastroesophageal reflux disease without esophagitis    Galactorrhea of left breast    Generalized hyperhidrosis    Fatigue    CARDIOVASCULAR SCREENING; LDL GOAL LESS THAN 160    Depression with anxiety    Class 1 obesity due to excess calories with serious comorbidity and body  mass index (BMI) of 33.0 to 33.9 in adult    History of DVT (deep vein thrombosis)    Spasm of muscle    Essential hypertension    Migraine without aura and without status migrainosus, not intractable     Current Outpatient Medications   Medication Sig Dispense Refill    celecoxib (CELEBREX) 100 MG capsule Take 1 capsule (100 mg) by mouth 2 times daily. 60 capsule 0    gabapentin (NEURONTIN) 100 MG capsule Take 1 capsule (100 mg) by mouth 3 times daily. (Patient taking differently: Take 100 mg by mouth 3 times daily as needed.) 90 capsule 0    losartan (COZAAR) 25 MG tablet Take 1 tablet (25 mg) by mouth daily 90 tablet 3    tiZANidine (ZANAFLEX) 2 MG tablet Take 1-2 tablets (2-4 mg) by mouth 2 times daily as needed for muscle spasms. 90 tablet 0     No current facility-administered medications for this visit.        Allergies   Allergen Reactions    Chantix [Varenicline Tartrate] Anxiety     Review of Systems   Constitutional:  Negative for chills, fatigue and fever.   Cardiovascular: Negative.  Negative for chest pain, palpitations and leg swelling.   Musculoskeletal:  Positive for arthralgias, back pain, gait problem and myalgias. Negative for joint swelling, neck pain and neck stiffness.   Skin:  Negative for color change, pallor, rash and wound.   Neurological:  Negative for numbness.   All other systems reviewed and are negative.          Objective    /77   Pulse 73   Temp 97.9  F (36.6  C) (Oral)   Resp 18   Wt 80.3 kg (177 lb)   SpO2 99%   BMI 28.11 kg/m    Body mass index is 28.11 kg/m .  Physical Exam  Vitals and nursing note reviewed.   Constitutional:       General: She is in acute distress.      Appearance: Normal appearance. She is normal weight. She is not ill-appearing.   Cardiovascular:      Rate and Rhythm: Normal rate and regular rhythm.      Pulses: Normal pulses.      Heart sounds: Normal heart sounds, S1 normal and S2 normal. No murmur heard.     No friction rub. No gallop.    Pulmonary:      Effort: Pulmonary effort is normal. No accessory muscle usage, respiratory distress or retractions.      Breath sounds: Normal breath sounds. No decreased breath sounds, wheezing, rhonchi or rales.   Abdominal:      General: Abdomen is flat. Bowel sounds are normal.      Palpations: Abdomen is soft. There is no mass.      Tenderness: There is no abdominal tenderness. There is no right CVA tenderness, left CVA tenderness, guarding or rebound. Negative signs include Alvares's sign and McBurney's sign.      Hernia: No hernia is present.   Musculoskeletal:      Lumbar back: Spasms and tenderness present. No swelling, edema, deformity, lacerations or bony tenderness. Decreased range of motion. Positive right straight leg raise test. Negative left straight leg raise test.   Skin:     General: Skin is warm and dry.      Capillary Refill: Capillary refill takes less than 2 seconds.   Neurological:      General: No focal deficit present.      Mental Status: She is alert and oriented to person, place, and time.      Sensory: Sensation is intact.      Motor: Motor function is intact.      Gait: Gait abnormal.      Deep Tendon Reflexes: Reflexes are normal and symmetric.   Psychiatric:         Mood and Affect: Mood normal.         Behavior: Behavior normal.         Thought Content: Thought content normal.         Judgment: Judgment normal.              Assessment/Plan:  Acute right-sided low back pain with right-sided sciatica:  Due to the severity of her pain, recommend further evaluation and management in the ER/ortho urgent care.  Will most likely need further advanced imaging and better pain control.  She has opted for [facility] urgent care.  Call 911/ER if worsening symptoms.  S/he left in stable condition with AVS in hand.  F/u with PCP after ER visit.         Sari Olivas PA-C

## 2025-07-13 ENCOUNTER — HEALTH MAINTENANCE LETTER (OUTPATIENT)
Age: 50
End: 2025-07-13

## 2025-07-24 ENCOUNTER — PATIENT OUTREACH (OUTPATIENT)
Dept: CARE COORDINATION | Facility: CLINIC | Age: 50
End: 2025-07-24
Payer: COMMERCIAL

## 2025-07-29 DIAGNOSIS — R10.31 RIGHT LOWER QUADRANT PAIN: ICD-10-CM

## 2025-07-29 RX ORDER — CELECOXIB 100 MG/1
100 CAPSULE ORAL 2 TIMES DAILY
Qty: 60 CAPSULE | Refills: 1 | Status: SHIPPED | OUTPATIENT
Start: 2025-07-29

## 2025-07-29 NOTE — TELEPHONE ENCOUNTER
Pharmacy Message: Pt is out of refills for cyclobenzaprine. Please send new rx and pt will followed up with appointment. thanks

## 2025-07-30 ENCOUNTER — TELEPHONE (OUTPATIENT)
Dept: FAMILY MEDICINE | Facility: CLINIC | Age: 50
End: 2025-07-30
Payer: COMMERCIAL

## 2025-07-30 DIAGNOSIS — M62.838 SPASM OF MUSCLE: ICD-10-CM

## 2025-07-30 RX ORDER — CYCLOBENZAPRINE HCL 10 MG
10 TABLET ORAL 3 TIMES DAILY PRN
Qty: 90 TABLET | Refills: 1 | Status: SHIPPED | OUTPATIENT
Start: 2025-07-30

## 2025-07-30 NOTE — TELEPHONE ENCOUNTER
Clinic RN: Please investigate patient's chart or contact patient if the information cannot be found because the medication is listed as historical or discontinued. Confirm patient is taking this medication. Document findings and route refill encounter to provider for approval or denial.    Mary Alicia RN on 7/30/2025 at 1:23 PM

## 2025-07-30 NOTE — TELEPHONE ENCOUNTER
First attempt. Left message for patient to return call to clinic per refill team:    Prescription requested: Cyclobenzaprine    Clinic RN: Please investigate patient's chart or contact patient if the information cannot be found because the medication is listed as historical or discontinued. Confirm patient is taking this medication. Document findings and route refill encounter to provider for approval or denial.     Per chart review, patient does have a long history of using this prescription but was stopped in May 2025.     RN, if/when patient returns call, please follow triage protocol as appropriate. Janae Stewart, RN, BSN, PHN

## 2025-07-30 NOTE — TELEPHONE ENCOUNTER
Pharmacy message:  RE: celecoxib (CELEBREX) 100 MG capsule   Pt is requesting a new rx for cyclobenzaprene- pt didn't request an rx for celecoxib.  Thanks.

## 2025-07-30 NOTE — TELEPHONE ENCOUNTER
Routing to provider for further review. Please see [employer] correspondence. Janae Stewart, RN, BSN, PHN

## 2025-08-21 ENCOUNTER — PATIENT OUTREACH (OUTPATIENT)
Dept: CARE COORDINATION | Facility: CLINIC | Age: 50
End: 2025-08-21
Payer: COMMERCIAL

## (undated) DEVICE — GLOVE PROTEXIS BLUE W/NEU-THERA 7.5  2D73EB75

## (undated) DEVICE — ANOSCOPE PLASTIC CLEAR UNSTERILE 82420

## (undated) DEVICE — TAPE DURAPORE 2"X1.5YD SILK 1538S-2

## (undated) DEVICE — TUBING SMOKE EVAC 2.2CMX3M SEA3715

## (undated) DEVICE — PREP TECHNI-CARE CHLOROXYLENOL 3% 4OZ BOTTLE C222-4ZWO

## (undated) DEVICE — DRSG GAUZE 4X4" TRAY 6939

## (undated) DEVICE — SU MONOCRYL 4-0 PS-2 27" UND Y426H

## (undated) DEVICE — SU VICRYL 3-0 SH 27" UND J416H

## (undated) DEVICE — NDL 25GA 5/8" 305122

## (undated) DEVICE — TAPE DURAPORE 3" SILK 1538-3

## (undated) DEVICE — ESU GROUND PAD ADULT W/CORD E7507

## (undated) DEVICE — ESU ELEC BLADE 6" COATED E1450-6

## (undated) DEVICE — SWAB PROCTO 16" 2/PK 32-046

## (undated) DEVICE — DRSG TELFA 3X8" 1238

## (undated) DEVICE — PACK RECTAL KIT CUSTOM ASC

## (undated) DEVICE — SOL WATER IRRIG 1000ML BOTTLE 07139-09

## (undated) DEVICE — SOL WATER IRRIG 500ML BOTTLE 2F7113

## (undated) DEVICE — STRAP KNEE/BODY 31143004

## (undated) DEVICE — GLOVE PROTEXIS MICRO 7.0  2D73PM70

## (undated) DEVICE — LINEN TOWEL PACK X5 5464

## (undated) DEVICE — SUCTION MANIFOLD NEPTUNE 2 SYS 1 PORT 702-025-000

## (undated) DEVICE — PAD CHUX UNDERPAD 30X30"

## (undated) RX ORDER — DEXAMETHASONE SODIUM PHOSPHATE 4 MG/ML
INJECTION, SOLUTION INTRA-ARTICULAR; INTRALESIONAL; INTRAMUSCULAR; INTRAVENOUS; SOFT TISSUE
Status: DISPENSED
Start: 2021-04-30

## (undated) RX ORDER — LIDOCAINE HYDROCHLORIDE AND EPINEPHRINE 10; 10 MG/ML; UG/ML
INJECTION, SOLUTION INFILTRATION; PERINEURAL
Status: DISPENSED
Start: 2021-04-30

## (undated) RX ORDER — KETOROLAC TROMETHAMINE 30 MG/ML
INJECTION, SOLUTION INTRAMUSCULAR; INTRAVENOUS
Status: DISPENSED
Start: 2021-04-30

## (undated) RX ORDER — BUPIVACAINE HYDROCHLORIDE 2.5 MG/ML
INJECTION, SOLUTION EPIDURAL; INFILTRATION; INTRACAUDAL
Status: DISPENSED
Start: 2021-04-30

## (undated) RX ORDER — GLYCOPYRROLATE 0.2 MG/ML
INJECTION INTRAMUSCULAR; INTRAVENOUS
Status: DISPENSED
Start: 2021-04-30

## (undated) RX ORDER — ONDANSETRON 2 MG/ML
INJECTION INTRAMUSCULAR; INTRAVENOUS
Status: DISPENSED
Start: 2021-04-30

## (undated) RX ORDER — LIDOCAINE HYDROCHLORIDE 20 MG/ML
INJECTION, SOLUTION EPIDURAL; INFILTRATION; INTRACAUDAL; PERINEURAL
Status: DISPENSED
Start: 2021-04-30

## (undated) RX ORDER — PROPOFOL 10 MG/ML
INJECTION, EMULSION INTRAVENOUS
Status: DISPENSED
Start: 2021-04-30

## (undated) RX ORDER — BUPIVACAINE HYDROCHLORIDE AND EPINEPHRINE 5; 5 MG/ML; UG/ML
INJECTION, SOLUTION EPIDURAL; INTRACAUDAL; PERINEURAL
Status: DISPENSED
Start: 2021-04-30

## (undated) RX ORDER — FENTANYL CITRATE 50 UG/ML
INJECTION, SOLUTION INTRAMUSCULAR; INTRAVENOUS
Status: DISPENSED
Start: 2021-04-30

## (undated) RX ORDER — CEFAZOLIN SODIUM 2 G/50ML
SOLUTION INTRAVENOUS
Status: DISPENSED
Start: 2021-04-30

## (undated) RX ORDER — FENTANYL CITRATE-0.9 % NACL/PF 10 MCG/ML
PLASTIC BAG, INJECTION (ML) INTRAVENOUS
Status: DISPENSED
Start: 2021-04-30

## (undated) RX ORDER — FENTANYL CITRATE 50 UG/ML
INJECTION, SOLUTION INTRAMUSCULAR; INTRAVENOUS
Status: DISPENSED
Start: 2023-09-26